# Patient Record
Sex: MALE | Race: WHITE | Employment: OTHER
[De-identification: names, ages, dates, MRNs, and addresses within clinical notes are randomized per-mention and may not be internally consistent; named-entity substitution may affect disease eponyms.]

---

## 2017-04-18 ENCOUNTER — RECORDS - HEALTHEAST (OUTPATIENT)
Dept: ADMINISTRATIVE | Facility: OTHER | Age: 77
End: 2017-04-18

## 2017-04-18 ENCOUNTER — ALLIED HEALTH/NURSE VISIT (OUTPATIENT)
Dept: FAMILY MEDICINE | Facility: CLINIC | Age: 77
End: 2017-04-18
Payer: COMMERCIAL

## 2017-04-18 VITALS — DIASTOLIC BLOOD PRESSURE: 81 MMHG | SYSTOLIC BLOOD PRESSURE: 120 MMHG

## 2017-04-18 DIAGNOSIS — Z01.30 BLOOD PRESSURE CHECK: Primary | ICD-10-CM

## 2017-04-18 PROCEDURE — 99207 ZZC NO CHARGE NURSE ONLY: CPT | Performed by: FAMILY MEDICINE

## 2017-04-18 NOTE — PROGRESS NOTES
rFoy Loredo is enrolled/participating in the retail pharmacy Blood Pressure Goals Achievement Program (BPGAP).  Froy Loredo was evaluated at [facility] on April 18, 2017 at which time his blood pressure was:    BP Readings from Last 3 Encounters:   04/18/17 120/81   05/17/16 128/78   03/16/16 142/88     Reviewed lifestyle modifications for blood pressure control and reduction: including making healthy food choices, managing weight, getting regular exercise, smoking cessation, reducing alcohol consumption, monitoring blood pressure regularly.     Froy Loredo is not experiencing symptoms.    Follow-Up: BP is at goal of < 140/90mmHg (patient 18+ years of age with or without diabetes).  Recommended follow-up at pharmacy in 6 months.     Completed by:   Laurie Nation, PharmD   [facility] pharmacist on behalf of [facility].

## 2017-04-18 NOTE — MR AVS SNAPSHOT
After Visit Summary   4/18/2017    Froy Loredo    MRN: [id number]           Patient Information     Date Of Birth          1940        Visit Information        Provider Department      4/18/2017 1:17 PM Bj Hull MD [facility]        Today's Diagnoses     Blood pressure check    -  1       Follow-ups after your visit        Who to contact     If you have questions or need follow up information about today's clinic visit or your schedule please contact [facility] directly at [phone].  Normal or non-critical lab and imaging results will be communicated to you by Securanthart, letter or phone within 4 business days after the clinic has received the results. If you do not hear from us within 7 days, please contact the clinic through Swallow Solution[employer] or phone. If you have a critical or abnormal lab result, we will notify you by phone as soon as possible.  Submit refill requests through Tab[employer] or call your pharmacy and they will forward the refill request to us. Please allow 3 business days for your refill to be completed.          Additional Information About Your Visit        MyChart Information     [employer] gives you secure access to your electronic health record. If you see a primary care provider, you can also send messages to your care team and make appointments. If you have questions, please call your primary care clinic.  If you do not have a primary care provider, please call [phone] and they will assist you.        Care EveryWhere ID     This is your Care EveryWhere ID. This could be used by other organizations to access your [facility] medical records  [id number]         Blood Pressure from Last 3 Encounters:   04/18/17 120/81   05/17/16 128/78   03/16/16 142/88    Weight from Last 3 Encounters:   01/26/16 155 lb (70.3 kg)   06/29/15 156 lb (70.8 kg)   04/01/15 154 lb (69.9 kg)              Today, you had the following     No orders found for display        Primary Care Provider Office Phone # Fax #    Bj Hull [phone]       [facility] [address] [facility] [facility] [address] [address]        Thank you!     Thank you for choosing [facility]  for your care. Our goal is always to provide you with excellent care. Hearing back from our patients is one way we can continue to improve our services. Please take a few minutes to complete the written survey that you may receive in the mail after your visit with us. Thank you!             Your Updated Medication List - Protect others around you: Learn how to safely use, store and throw away your medicines at www.disposemymeds.org.          This list is accurate as of: 4/18/17  1:19 PM.  Always use your most recent med list.                   Brand Name Dispense Instructions for use    finasteride 5 MG tablet    PROSCAR    90 tablet    Take 1 tablet (5 mg) by mouth daily       MULTI-VITAMIN PO      1 tablet DAILY       polyethylene glycol 0.4%- propylene glycol 0.3% 0.4-0.3 % Soln ophthalmic solution    SYSTANE ULTRA     Place 1 drop into both eyes 2 times daily       tadalafil 5 MG tablet    CIALIS    30 tablet    Take 1 tablet (5 mg) by mouth daily Never use with nitroglycerin, terazosin or doxazosin.       tamsulosin 0.4 MG capsule    FLOMAX    180 capsule    Take 2 capsules (0.8 mg) by mouth daily       VITAMIN C PO      2 TABLETS DAILY

## 2017-07-03 ENCOUNTER — RADIANT APPOINTMENT (OUTPATIENT)
Dept: GENERAL RADIOLOGY | Facility: CLINIC | Age: 77
End: 2017-07-03
Attending: ORTHOPAEDIC SURGERY
Payer: COMMERCIAL

## 2017-07-03 ENCOUNTER — OFFICE VISIT (OUTPATIENT)
Dept: ORTHOPEDICS | Facility: CLINIC | Age: 77
End: 2017-07-03
Payer: COMMERCIAL

## 2017-07-03 VITALS — BODY MASS INDEX: 24.17 KG/M2 | HEIGHT: 67 IN | RESPIRATION RATE: 18 BRPM | WEIGHT: 154 LBS | TEMPERATURE: 98.1 F

## 2017-07-03 DIAGNOSIS — M16.12 PRIMARY OSTEOARTHRITIS OF LEFT HIP: ICD-10-CM

## 2017-07-03 DIAGNOSIS — M25.552 HIP PAIN, LEFT: ICD-10-CM

## 2017-07-03 DIAGNOSIS — M25.552 HIP PAIN, LEFT: Primary | ICD-10-CM

## 2017-07-03 PROCEDURE — 73502 X-RAY EXAM HIP UNI 2-3 VIEWS: CPT

## 2017-07-03 PROCEDURE — 99214 OFFICE O/P EST MOD 30 MIN: CPT | Performed by: ORTHOPAEDIC SURGERY

## 2017-07-03 RX ORDER — SULINDAC 200 MG/1
200 TABLET ORAL 2 TIMES DAILY
Qty: 60 TABLET | Refills: 11 | Status: SHIPPED | OUTPATIENT
Start: 2017-07-03 | End: 2019-03-25

## 2017-07-03 NOTE — MR AVS SNAPSHOT
"              After Visit Summary   7/3/2017    Froy Loredo    MRN: [id number]           Patient Information     Date Of Birth          1940        Visit Information        Provider Department      7/3/2017 10:15 AM Carlos Drake MD [facility]        Today's Diagnoses     Hip pain, left    -  1    Primary osteoarthritis of left hip           Follow-ups after your visit        Who to contact     If you have questions or need follow up information about today's clinic visit or your schedule please contact [facility] directly at [phone].  Normal or non-critical lab and imaging results will be communicated to you by Tinfoil Securityhart, letter or phone within 4 business days after the clinic has received the results. If you do not hear from us within 7 days, please contact the clinic through NO[employer] or phone. If you have a critical or abnormal lab result, we will notify you by phone as soon as possible.  Submit refill requests through Keep [employer] or call your pharmacy and they will forward the refill request to us. Please allow 3 business days for your refill to be completed.          Additional Information About Your Visit        MyChart Information     [employer] gives you secure access to your electronic health record. If you see a primary care provider, you can also send messages to your care team and make appointments. If you have questions, please call your primary care clinic.  If you do not have a primary care provider, please call [phone] and they will assist you.        Care EveryWhere ID     This is your Care EveryWhere ID. This could be used by other organizations to access your [place] [facility] medical records  [id number]        Your Vitals Were     Temperature Respirations Height BMI (Body Mass Index)          98.1  F (36.7  C) 18 1.702 m (5' 7\") 24.12 kg/m2         Blood Pressure from Last 3 Encounters:   04/18/17 120/81   05/17/16 128/78   03/16/16 142/88    Weight from " Last 3 Encounters:   07/03/17 69.9 kg (154 lb)   01/26/16 70.3 kg (155 lb)   06/29/15 70.8 kg (156 lb)                 Today's Medication Changes          These changes are accurate as of: 7/3/17 11:59 PM.  If you have any questions, ask your nurse or doctor.               Start taking these medicines.        Dose/Directions    sulindac 200 MG tablet   Commonly known as:  CLINORIL   Used for:  Primary osteoarthritis of left hip   Started by:  Carlos Drake MD        Dose:  200 mg   Take 1 tablet (200 mg) by mouth 2 times daily   Quantity:  60 tablet   Refills:  11            Where to get your medicines      Some of these will need a paper prescription and others can be bought over the counter.  Ask your nurse if you have questions.     Bring a paper prescription for each of these medications     sulindac 200 MG tablet                Primary Care Provider Office Phone # Fax #    Bj Hull [phone]       [facility] [id number] [place] [address]        Equal Access to Services     CHITO PINZON : Hadii aad ku hadasho Soomaali, waaxda luqadaha, qaybta kaalmada adeegyada, waxay idiin hayvinhn sylvia khbettie wilson . So [facility] [phone].    ATENCIÓN: Si habla español, tiene a kolb disposición servicios gratuitos de asistencia lingüística. Llame al [phone].    We comply with applicable federal civil rights laws and [place] laws. We do not discriminate on the basis of race, color, national origin, age, disability sex, sexual orientation or gender identity.            Thank you!     Thank you for choosing [facility]  for your care. Our goal is always to provide you with excellent care. Hearing back from our patients is one way we can continue to improve our services. Please take a few minutes to complete the written survey that you may receive in the mail after your visit with us. Thank you!             Your Updated Medication List - Protect others  around you: Learn how to safely use, store and throw away your medicines at www.disposemymeds.org.          This list is accurate as of: 7/3/17 11:59 PM.  Always use your most recent med list.                   Brand Name Dispense Instructions for use Diagnosis    finasteride 5 MG tablet    PROSCAR    90 tablet    Take 1 tablet (5 mg) by mouth daily    BPH (benign prostatic hypertrophy) with urinary obstruction       MULTI-VITAMIN PO      1 tablet DAILY        polyethylene glycol 0.4%- propylene glycol 0.3% 0.4-0.3 % Soln ophthalmic solution    SYSTANE ULTRA     Place 1 drop into both eyes 2 times daily    Dry eyes, bilateral       sulindac 200 MG tablet    CLINORIL    60 tablet    Take 1 tablet (200 mg) by mouth 2 times daily    Primary osteoarthritis of left hip       tadalafil 5 MG tablet    CIALIS    30 tablet    Take 1 tablet (5 mg) by mouth daily Never use with nitroglycerin, terazosin or doxazosin.    ED (erectile dysfunction)       tamsulosin 0.4 MG capsule    FLOMAX    180 capsule    Take 2 capsules (0.8 mg) by mouth daily    BPH (benign prostatic hypertrophy) with urinary obstruction       VITAMIN C PO      2 TABLETS DAILY

## 2017-07-03 NOTE — LETTER
7/3/2017         RE: Froy Loredo  [address] [facility] [address]        Dear Colleague,    Thank you for referring your patient, Froy Loredo, to the [facility]. Please see a copy of my visit note below.    Froy Loredo is a 76 year old male who is seen as self referral for left thigh and calf pain.  Pain present for 4 months.  No history of injury.  Describes sharp and dull pain rated 5-8/10.  Worse with walking.  Tried ibuprofen, but stomach upset.      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 No family hx of      CANCER No family hx of      Hypertension No family hx of      CEREBROVASCULAR DISEASE No family hx of      Thyroid Disease No family hx of      Glaucoma No family hx of      Macular Degeneration No family hx of      Breast Cancer No family hx of      Prostate Cancer No family hx of      Depression No family hx of      Anxiety Disorder No family hx of      MENTAL ILLNESS No family hx of      Substance Abuse No family hx of      Anesthesia Reaction No family hx of      Asthma No family hx of      OSTEOPOROSIS No family hx of      Genetic Disorder No family hx of      Obesity No family hx of       Unknown/Adopted No family hx of        Social History     Social History     Marital status:      Spouse name: Radha     Number of children: 2     Years of education: N/A     Occupational History     Real estate appraisal Retired     [place] [employer]      Retired     Social History Main Topics     Smoking status: Never Smoker     Smokeless tobacco: Never Used      Comment: Lives in smoke free household     Alcohol use No     Drug use: No     Sexual activity: Yes     Partners: Female     Birth control/ protection: None     Other Topics Concern     Parent/Sibling W/ Cabg, Mi Or Angioplasty Before 65f 55m? No      Service Yes     Blood Transfusions No     Caffeine Concern No     Occupational Exposure No     Hobby Hazards No     Sleep Concern Yes     occazanaly     Stress Concern No     Weight Concern No     Special Diet No     Back Care No     Exercise Yes     Bike Helmet No     Seat Belt Yes     Self-Exams No     Social History Narrative       Current Outpatient Prescriptions   Medication Sig Dispense Refill     sulindac (CLINORIL) 200 MG tablet Take 1 tablet (200 mg) by mouth 2 times daily 60 tablet 11     finasteride (PROSCAR) 5 MG tablet Take 1 tablet (5 mg) by mouth daily 90 tablet 3     tamsulosin (FLOMAX) 0.4 MG 24 hr capsule Take 2 capsules (0.8 mg) by mouth daily 180 capsule 3     tadalafil (CIALIS) 5 MG tablet Take 1 tablet (5 mg) by mouth daily Never use with nitroglycerin, terazosin or doxazosin. 30 tablet 2     polyethylene glycol 0.4%- propylene glycol 0.3% (SYSTANE ULTRA) 0.4-0.3 % SOLN ophthalmic solution Place 1 drop into both eyes 2 times daily       MULTI-VITAMIN OR  1 tablet DAILY       VITAMIN C OR 2 TABLETS DAILY         Allergies   Allergen Reactions     Doxazosin      Lightheadedness 2013, resolved with switch to tamsulosin        REVIEW OF SYSTEMS:  CONSTITUTIONAL:  NEGATIVE for fever, chills, change in weight, not feeling tired  SKIN:  NEGATIVE for worrisome rashes, no skin lumps, no  "skin ulcers and no non-healing wounds  EYES:  NEGATIVE for vision changes or irritation.  ENT/MOUTH:  NEGATIVE.  No hearing loss, no hoarseness, no difficulty swallowing.  RESP:  NEGATIVE. No cough or shortness of breath.  CV:  NEGATIVE for chest pain, palpitations or peripheral edema  GI:  NEGATIVE for nausea, abdominal pain, heartburn, or change in bowel habits  :  Negative. No dysuria, no hematuria  MUSCULOSKELETAL:  See HPI above  NEURO:  NEGATIVE . No headaches, no dizziness,  no numbness  ENDOCRINE:  NEGATIVE for temperature intolerance, skin/hair changes  HEME/ALLERGY/IMMUNE:  NEGATIVE for bleeding problems  PSYCHIATRIC:  NEGATIVE. no anxiety, no depression.    Xray images were independently visualized with the patient. Pelvis and lateral left hip show mild bilateral hip osteoarthritis.  Unchanged since 2012.    Exam:  Vitals: Temp 98.1  F (36.7  C)  Resp 18  Ht 1.702 m (5' 7\")  Wt 69.9 kg (154 lb)  BMI 24.12 kg/m2  BMI= Body mass index is 24.12 kg/(m^2).  Constitutional:  healthy, alert and no distress  Neuro: Alert and Oriented x 3, Gait normal. Sensation grossly WNL.  HEENT:  Atraumatic, EOMI  Neck:  Neck supple with no tenderness.  Psych: Affect normal   Respiratory: Breathing not labored.  Cardiovascular: normal peripheral pulses  Lymph: no adenopathy  Skin: No rashes,worrisome lesions or skin problems  Spine: straight, no straight leg raising pain.  There is no tenderness over the sacro-iliac joints, sciatic notch, or greater trochanters.   Hips have decreased range of motion with right external rotation 50 degrees, internal rotation 20 degrees without pain.  Left has external rotation 50 degrees, internal rotation 15 degrees with moderate pain.  Knees have full range of motion without pain.  No ligament laxity.  Negative Maximilian.  No effusion.  No warmth or erythema.  No tenderness along iliotibial band.  No calf tenderness.  Sensation, motor and circulation are intact.    Assessment:  Left leg " pain with limited range of motion of hips and mild osteoarthritis of hips by x-ray.  Certainly not severe enough for surgery.  Plan:  Sulindac 200 mg twice daily.  Resume activity as tolerated.    Again, thank you for allowing me to participate in the care of your patient.        Sincerely,        Carlos Drake MD

## 2017-07-03 NOTE — NURSING NOTE
"Chief Complaint   Patient presents with     RECHECK     New issue. Left thigh and calf pain for the last 4 months, no injury. Pain level 5-8/10 sharp, dull and episodic. Walking makes the pain worse and nothing helps the pain.       Initial Temp 98.1  F (36.7  C)  Resp 18  Ht 1.702 m (5' 7\")  Wt 69.9 kg (154 lb)  BMI 24.12 kg/m2 Estimated body mass index is 24.12 kg/(m^2) as calculated from the following:    Height as of this encounter: 1.702 m (5' 7\").    Weight as of this encounter: 69.9 kg (154 lb).  Medication Reconciliation: complete   Mary Kay Bautista MA      "

## 2017-07-06 NOTE — TELEPHONE ENCOUNTER
Patient overdue for physical and labs.   Per PCP, patient was due in Jan 2017.    No appointment pending at this time.  Routing to provider to advise.    Mansi Montano RN

## 2017-07-06 NOTE — PROGRESS NOTES
Froy Loredo is a 76 year old male who is seen as self referral for left thigh and calf pain.  Pain present for 4 months.  No history of injury.  Describes sharp and dull pain rated 5-8/10.  Worse with walking.  Tried ibuprofen, but stomach upset.      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 No family hx of      CANCER No family hx of      Hypertension No family hx of      CEREBROVASCULAR DISEASE No family hx of      Thyroid Disease No family hx of      Glaucoma No family hx of      Macular Degeneration No family hx of      Breast Cancer No family hx of      Prostate Cancer No family hx of      Depression No family hx of      Anxiety Disorder No family hx of      MENTAL ILLNESS No family hx of      Substance Abuse No family hx of      Anesthesia Reaction No family hx of      Asthma No family hx of      OSTEOPOROSIS No family hx of      Genetic Disorder No family hx of      Obesity No family hx of      Unknown/Adopted No family hx of        Social History     Social History     Marital status:      Spouse name: Radha     Number of children: 2     Years of education: N/A     Occupational History     Real estate appraisal Retired     [place]  [employer]      Retired     Social History Main Topics     Smoking status: Never Smoker     Smokeless tobacco: Never Used      Comment: Lives in smoke free household     Alcohol use No     Drug use: No     Sexual activity: Yes     Partners: Female     Birth control/ protection: None     Other Topics Concern     Parent/Sibling W/ Cabg, Mi Or Angioplasty Before 65f 55m? No      Service Yes     Blood Transfusions No     Caffeine Concern No     Occupational Exposure No     Hobby Hazards No     Sleep Concern Yes     occazanaly     Stress Concern No     Weight Concern No     Special Diet No     Back Care No     Exercise Yes     Bike Helmet No     Seat Belt Yes     Self-Exams No     Social History Narrative       Current Outpatient Prescriptions   Medication Sig Dispense Refill     sulindac (CLINORIL) 200 MG tablet Take 1 tablet (200 mg) by mouth 2 times daily 60 tablet 11     finasteride (PROSCAR) 5 MG tablet Take 1 tablet (5 mg) by mouth daily 90 tablet 3     tamsulosin (FLOMAX) 0.4 MG 24 hr capsule Take 2 capsules (0.8 mg) by mouth daily 180 capsule 3     tadalafil (CIALIS) 5 MG tablet Take 1 tablet (5 mg) by mouth daily Never use with nitroglycerin, terazosin or doxazosin. 30 tablet 2     polyethylene glycol 0.4%- propylene glycol 0.3% (SYSTANE ULTRA) 0.4-0.3 % SOLN ophthalmic solution Place 1 drop into both eyes 2 times daily       MULTI-VITAMIN OR  1 tablet DAILY       VITAMIN C OR 2 TABLETS DAILY         Allergies   Allergen Reactions     Doxazosin      Lightheadedness 2013, resolved with switch to tamsulosin        REVIEW OF SYSTEMS:  CONSTITUTIONAL:  NEGATIVE for fever, chills, change in weight, not feeling tired  SKIN:  NEGATIVE for worrisome rashes, no skin lumps, no skin ulcers and no non-healing wounds  EYES:  NEGATIVE for vision changes or irritation.  ENT/MOUTH:  NEGATIVE.  No hearing loss, no hoarseness, no difficulty swallowing.  RESP:  NEGATIVE. No cough or shortness of breath.  CV:  NEGATIVE for chest  "pain, palpitations or peripheral edema  GI:  NEGATIVE for nausea, abdominal pain, heartburn, or change in bowel habits  :  Negative. No dysuria, no hematuria  MUSCULOSKELETAL:  See HPI above  NEURO:  NEGATIVE . No headaches, no dizziness,  no numbness  ENDOCRINE:  NEGATIVE for temperature intolerance, skin/hair changes  HEME/ALLERGY/IMMUNE:  NEGATIVE for bleeding problems  PSYCHIATRIC:  NEGATIVE. no anxiety, no depression.    Xray images were independently visualized with the patient. Pelvis and lateral left hip show mild bilateral hip osteoarthritis.  Unchanged since 2012.    Exam:  Vitals: Temp 98.1  F (36.7  C)  Resp 18  Ht 1.702 m (5' 7\")  Wt 69.9 kg (154 lb)  BMI 24.12 kg/m2  BMI= Body mass index is 24.12 kg/(m^2).  Constitutional:  healthy, alert and no distress  Neuro: Alert and Oriented x 3, Gait normal. Sensation grossly WNL.  HEENT:  Atraumatic, EOMI  Neck:  Neck supple with no tenderness.  Psych: Affect normal   Respiratory: Breathing not labored.  Cardiovascular: normal peripheral pulses  Lymph: no adenopathy  Skin: No rashes,worrisome lesions or skin problems  Spine: straight, no straight leg raising pain.  There is no tenderness over the sacro-iliac joints, sciatic notch, or greater trochanters.   Hips have decreased range of motion with right external rotation 50 degrees, internal rotation 20 degrees without pain.  Left has external rotation 50 degrees, internal rotation 15 degrees with moderate pain.  Knees have full range of motion without pain.  No ligament laxity.  Negative Maximilian.  No effusion.  No warmth or erythema.  No tenderness along iliotibial band.  No calf tenderness.  Sensation, motor and circulation are intact.    Assessment:  Left leg pain with limited range of motion of hips and mild osteoarthritis of hips by x-ray.  Certainly not severe enough for surgery.  Plan:  Sulindac 200 mg twice daily.  Resume activity as tolerated.  "

## 2017-07-11 RX ORDER — FINASTERIDE 5 MG/1
5 TABLET, FILM COATED ORAL DAILY
Qty: 90 TABLET | Refills: 3 | Status: CANCELLED | OUTPATIENT
Start: 2017-07-11

## 2017-07-11 RX ORDER — TAMSULOSIN HYDROCHLORIDE 0.4 MG/1
0.8 CAPSULE ORAL DAILY
Qty: 180 CAPSULE | Refills: 3 | Status: CANCELLED | OUTPATIENT
Start: 2017-07-11

## 2017-07-12 RX ORDER — FINASTERIDE 5 MG/1
5 TABLET, FILM COATED ORAL DAILY
Qty: 90 TABLET | Refills: 3 | OUTPATIENT
Start: 2017-07-12

## 2017-07-12 RX ORDER — TAMSULOSIN HYDROCHLORIDE 0.4 MG/1
0.8 CAPSULE ORAL DAILY
Qty: 180 CAPSULE | Refills: 3 | OUTPATIENT
Start: 2017-07-12

## 2017-07-21 RX ORDER — FINASTERIDE 5 MG/1
5 TABLET, FILM COATED ORAL DAILY
Qty: 90 TABLET | Refills: 3 | OUTPATIENT
Start: 2017-07-21

## 2017-07-24 DIAGNOSIS — N40.1 BENIGN PROSTATIC HYPERPLASIA WITH URINARY OBSTRUCTION: Primary | ICD-10-CM

## 2017-07-24 DIAGNOSIS — N13.8 BENIGN PROSTATIC HYPERPLASIA WITH URINARY OBSTRUCTION: Primary | ICD-10-CM

## 2017-07-25 ENCOUNTER — DOCUMENTATION ONLY (OUTPATIENT)
Dept: LAB | Facility: CLINIC | Age: 77
End: 2017-07-25

## 2017-07-25 DIAGNOSIS — Z13.6 CARDIOVASCULAR SCREENING; LDL GOAL LESS THAN 160: Primary | ICD-10-CM

## 2017-07-25 DIAGNOSIS — Z00.00 ROUTINE GENERAL MEDICAL EXAMINATION AT A HEALTH CARE FACILITY: ICD-10-CM

## 2017-07-25 NOTE — PROGRESS NOTES
PLEASE REVIEW, PLACE FUTURE ORDERS OR SIGN PENDED ORDERS FOR PATIENT'S UPCOMING LAB ONLY APPOINTMENT ON 07.28.2017.    THANK YOU.   JEREMIAH STODDARD

## 2017-07-26 RX ORDER — TAMSULOSIN HYDROCHLORIDE 0.4 MG/1
0.8 CAPSULE ORAL DAILY
Qty: 180 CAPSULE | Refills: 0 | Status: SHIPPED | OUTPATIENT
Start: 2017-07-26 | End: 2017-08-02

## 2017-07-26 NOTE — PROGRESS NOTES
Please review lab orders sign and close encounter. Anastasia Flower MA/KATARZYNA    Physical 8/2/17

## 2017-07-26 NOTE — TELEPHONE ENCOUNTER
Medication is being filled for 1 time refill only due to:  Patient needs to be seen because it has been more than one year since last visit.   Joan Barnes RN    Pt has upcoming annual exam scheduled.

## 2017-07-28 DIAGNOSIS — Z00.00 ROUTINE GENERAL MEDICAL EXAMINATION AT A HEALTH CARE FACILITY: ICD-10-CM

## 2017-07-28 DIAGNOSIS — Z13.6 CARDIOVASCULAR SCREENING; LDL GOAL LESS THAN 160: ICD-10-CM

## 2017-07-28 LAB
ANION GAP SERPL CALCULATED.3IONS-SCNC: 11 MMOL/L (ref 3–14)
BUN SERPL-MCNC: 28 MG/DL (ref 7–30)
CALCIUM SERPL-MCNC: 8.9 MG/DL (ref 8.5–10.1)
CHLORIDE SERPL-SCNC: 107 MMOL/L (ref 94–109)
CHOLEST SERPL-MCNC: 166 MG/DL
CO2 SERPL-SCNC: 23 MMOL/L (ref 20–32)
CREAT SERPL-MCNC: 1.04 MG/DL (ref 0.66–1.25)
GFR SERPL CREATININE-BSD FRML MDRD: 69 ML/MIN/1.7M2
GLUCOSE SERPL-MCNC: 93 MG/DL (ref 70–99)
HDLC SERPL-MCNC: 65 MG/DL
LDLC SERPL CALC-MCNC: 89 MG/DL
NONHDLC SERPL-MCNC: 101 MG/DL
POTASSIUM SERPL-SCNC: 3.8 MMOL/L (ref 3.4–5.3)
SODIUM SERPL-SCNC: 141 MMOL/L (ref 133–144)
TRIGL SERPL-MCNC: 62 MG/DL

## 2017-07-28 PROCEDURE — 80048 BASIC METABOLIC PNL TOTAL CA: CPT | Performed by: FAMILY MEDICINE

## 2017-07-28 PROCEDURE — 80061 LIPID PANEL: CPT | Performed by: FAMILY MEDICINE

## 2017-07-28 PROCEDURE — 36415 COLL VENOUS BLD VENIPUNCTURE: CPT | Performed by: FAMILY MEDICINE

## 2017-07-28 NOTE — PROGRESS NOTES
"  SUBJECTIVE:   Froy Loredo is a 76 year old male who presents for Preventive Visit.    BPH/adrenal tumor - symptoms include polyuria and nocturia - previously has to get up at night every 2-3 hours to urinate (now down to 1-2 per night). There is some discomfort with urination. There is hesitation, difficult to start. Has to do \"go back\" sometimes. No gross hematuria but has had microscopic hematuria. Exam 9/18/14 showed a very large prostate.   Evaluation and treatment:    He has seen urology. U/S showed hydronephrosis but CT did not. 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    Surgical intervention for the adrenal tumor and BPH were discussed but with urology but decided against it.    Flomax 0.8 mg - has been helpful without side effects.   Finasteride 5 mg qd - has been helpful without side effects.     PSA previously borderline but has come down since.      PSA   Date Value Ref Range Status   01/26/2016 1.57 0 - 4 ug/L Final         PSA    Date  Value  Ref Range  Status    04/01/2015  1.82  0 - 4 ug/L  Final      Sinus tachycardia - asymptomatic. Review of his records showed high pulse rate off and on. Stress echo 7/5/13 normal.   continue to monitor.     TSH    Date  Value  Ref Range  Status    04/01/2015  1.24  0.40 - 4.00 mU/L  Final      Comment:      Effective 7/30/2014, the reference range for this assay has changed to reflect  new instrumentation/methodology.       Esophageal ring - EGD 7/18/11 showed schatzki ring with negative h pylori. He is asymptomatic at this time.    Insomnia - it takes a long time to fall asleep. Trazodone caused drowsiness in AM. He is ok now.    Fatigue - feels tired. He exercises. He has to rest after doing some yard work. Denies chest pain or shortness of breath. Denies depression, sleep apnea.   We will check some labs   He should report any chest pain or shortness of breath.    ED - Erection are poor. He has tried Levitra in the past which caused dizziness. I " don't know if he tried Cialis.     Ene    Pectus excavatum - asymptomatic.    Precancerous lesion on chest - per patient. Scar is noted.    Previous surgeries - right knee surgery.     Preventive:    Prevnar: advised to get at our pharmacy and he did.    Immunization History   Administered Date(s) Administered     Influenza (High Dose) 3 valent vaccine 09/18/2014     Pneumococcal (PCV 13) 08/02/2017     Pneumococcal 23 valent 06/23/2011     TD (ADULT, 7+) 05/11/2006     TDAP Vaccine (Boostrix) 10/25/2013     Zoster vaccine, live 04/22/2016       Lipids:     Recent Labs   Lab Test  07/28/17   1015  01/26/16   1243  09/11/14   0848  06/24/13   0947   CHOL  166   --   198  206*   HDL  65   --   61  49   LDL  89  117*  119  130*   TRIG  62   --   88  137   CHOLHDLRATIO   --    --   3.2  4.2       Diabetes screen:     Last Basic Metabolic Panel:  Lab Results   Component Value Date     07/28/2017      Lab Results   Component Value Date    POTASSIUM 3.8 07/28/2017     Lab Results   Component Value Date    CHLORIDE 107 07/28/2017     Lab Results   Component Value Date    KAMALJIT 8.9 07/28/2017     Lab Results   Component Value Date    CO2 23 07/28/2017     Lab Results   Component Value Date    BUN 28 07/28/2017     Lab Results   Component Value Date    CR 1.04 07/28/2017     Lab Results   Component Value Date    GLC 93 07/28/2017         Colonoscopy: 3/29/12 - repeat in 10 years    Advanced Directive: on file from 2011    Vit D Geriatrics Society Guidelines: Older adults should consume 4000 IU of Vit D daily from all sources. This will achieve serum level of 30. No need to test unless obese, malabsorption etc. You get only 100 units per glass of milk. Advised to take 2000 units daily.      SH:    Marital status:   Kids: 2  Employment: retired   Exercise: walking daily, bike sometimes  Tobacco: denies  Etoh: denies  Recreational drugs: denies  Caffeine: 1 pop per week              Social  History   Substance Use Topics     Smoking status: Never Smoker     Smokeless tobacco: Never Used      Comment: Lives in smoke free household     Alcohol use No       The patient does not drink >3 drinks per day nor >7 drinks per week.    Today's PHQ-2 Score:   PHQ-2 ( 1999 [employer]) 8/2/2017 1/26/2016   Q1: Little interest or pleasure in doing things 0 0   Q2: Feeling down, depressed or hopeless 0 0   PHQ-2 Score 0 0   Q1: Little interest or pleasure in doing things - -   Q2: Feeling down, depressed or hopeless - -   PHQ-2 Score - -         Do you feel safe in your environment - Yes    Do you have a Health Care Directive?: Yes: Advance Directive has been received and scanned.    Current providers sharing in care for this patient include: Patient Care Team:  Bj Hull MD as PCP - General (Family Practice)  Lise Sahni RN as       Hearing impairment: No    Ability to successfully perform activities of daily living: Yes, no assistance needed     Fall risk:  Fallen 2 or more times in the past year?: No  Any fall with injury in the past year?: No      Home safety:  none identified      The following health maintenance items are reviewed in Epic and correct as of today:Health Maintenance   Topic Date Due     PNEUMOCOCCAL (2 of 2 - PCV13) 06/23/2012     FALL RISK ASSESSMENT  01/26/2017     EYE EXAM Q1 YEAR  05/10/2017     INFLUENZA VACCINE (SYSTEM ASSIGNED)  09/01/2017     PSA Q3 YR  01/26/2019     ADVANCE DIRECTIVE PLANNING Q5 YRS  09/18/2019     LIPID SCREEN Q5 YR MALE (SYSTEM ASSIGNED)  01/26/2021     TETANUS IMMUNIZATION (SYSTEM ASSIGNED)  10/25/2023             ROS:  C: NEGATIVE for fever, chills, change in weight  I: NEGATIVE for worrisome rashes, moles or lesions  E: NEGATIVE for vision changes or irritation  E/M: NEGATIVE for ear, mouth and throat problems  R: NEGATIVE for significant cough or SOB  B: NEGATIVE for masses, tenderness or discharge  CV: NEGATIVE for chest pain,  "palpitations or peripheral edema  GI: NEGATIVE for nausea, abdominal pain, heartburn, or change in bowel habits  : NEGATIVE for frequency, dysuria, or hematuria  M: NEGATIVE for significant arthralgias or myalgia  N: NEGATIVE for weakness, dizziness or paresthesias  E: NEGATIVE for temperature intolerance, skin/hair changes  H: NEGATIVE for bleeding problems  P: NEGATIVE for changes in mood or affect    OBJECTIVE:   /70  Pulse 101  Temp 98.2  F (36.8  C) (Oral)  Ht 5' 6.25\" (1.683 m)  Wt 150 lb (68 kg)  SpO2 95%  BMI 24.03 kg/m2 Estimated body mass index is 24.12 kg/(m^2) as calculated from the following:    Height as of 7/3/17: 5' 7\" (1.702 m).    Weight as of 7/3/17: 154 lb (69.9 kg).  EXAM:     Male: GENERAL APPEARANCE: healthy, alert and no distress  EYES: Eyes grossly normal to inspection, PERRL and conjunctivae and sclerae normal  HENT: ear canals and TM's normal, nose and mouth without ulcers or lesions, oropharynx clear and oral mucous membranes moist  NECK: no adenopathy, no asymmetry, masses, or scars and thyroid normal to palpation  RESP: lungs clear to auscultation - no rales, rhonchi or wheezes  CV: regular rates and rhythm, normal S1 S2, no S3 or S4, no murmur, click or rub, no peripheral edema and peripheral pulses strong  ABDOMEN: soft, nontender, no hepatosplenomegaly, no masses and bowel sounds normal   (male): normal male genitalia without lesions or urethral discharge, no hernia  RECTAL: exam not done today but previously \"prostate very large\"   MS: Pectus excavatum.  SKIN: no suspicious lesions or rashes. Upper chest scar.  NEURO: Normal strength and tone, sensory exam grossly normal, mentation intact and speech normal  PSYCH: mentation appears normal and affect normal/bright    ASSESSMENT / PLAN:       End of Life Planning:  Patient currently has an advanced directive: Yes.  Practitioner is supportive of decision.    COUNSELING:  Reviewed preventive health counseling, as " "reflected in patient instructions       Regular exercise       Healthy diet/nutrition        Estimated body mass index is 24.12 kg/(m^2) as calculated from the following:    Height as of 7/3/17: 5' 7\" (1.702 m).    Weight as of 7/3/17: 154 lb (69.9 kg).     reports that he has never smoked. He has never used smokeless tobacco.        Appropriate preventive services were discussed with this patient, including applicable screening as appropriate for cardiovascular disease, diabetes, osteopenia/osteoporosis, and glaucoma.  As appropriate for age/gender, discussed screening for colorectal cancer, prostate cancer, breast cancer, and cervical cancer. Checklist reviewing preventive services available has been given to the patient.    Reviewed patients plan of care and provided an AVS. The Basic Care Plan (routine screening as documented in Health Maintenance) for Froy meets the Care Plan requirement. This Care Plan has been established and reviewed with the Patient.  Assessment and Plan - Decision Making    1. Routine general medical examination at a health care facility    Results of today's exam given to patient verbally along with age appropriate preventive measures. Written instructions reviewed and handed to the patient.      2. Benign prostatic hyperplasia with urinary obstruction  Per HPI  - tamsulosin (FLOMAX) 0.4 MG capsule; Take 2 capsules (0.8 mg) by mouth daily Please send in 3 months as he just got one already.  Dispense: 180 capsule; Refill: 2  - finasteride (PROSCAR) 5 MG tablet; Take 1 tablet (5 mg) by mouth daily  Dispense: 90 tablet; Refill: 3  - Prostate spec antigen screen    3. Other fatigue  Per HPI  - TSH with free T4 reflex  - Vitamin D Deficiency  - CBC with platelets differential      Written instructions given as follows:    Patient Instructions     1. I recommend including veggies, fresh fruits (3 to 5 servings), nuts (unsalted) in your daily diet. Cut down on carbs like bread, pasta, rice, " potatoes. Cut down on red meats like burgers etc. Increase healthy proteins like beans, tofu, tuna, chicken and turkey.    2. Get regular exercise like jogging, biking, swimming at least 3 times per week.      3. Avoid the sun or otherwise use sun screen - please look at the pamphlet I gave you about melanoma.    4. See the dentist and eye regularly.    5. Please stop by our pharmacy and get a pneumonia shot.    6. If all is well, let us meet again in one year.

## 2017-07-28 NOTE — PATIENT INSTRUCTIONS
1. I recommend including veggies, fresh fruits (3 to 5 servings), nuts (unsalted) in your daily diet. Cut down on carbs like bread, pasta, rice, potatoes. Cut down on red meats like burgers etc. Increase healthy proteins like beans, tofu, tuna, chicken and turkey.    2. Get regular exercise like jogging, biking, swimming at least 3 times per week.      3. Avoid the sun or otherwise use sun screen - please look at the pamphlet I gave you about melanoma.    4. See the dentist and eye regularly.    5. Please stop by our pharmacy and get a pneumonia shot.    6. If all is well, let us meet again in one year.

## 2017-08-02 ENCOUNTER — OFFICE VISIT (OUTPATIENT)
Dept: FAMILY MEDICINE | Facility: CLINIC | Age: 77
End: 2017-08-02
Payer: COMMERCIAL

## 2017-08-02 VITALS
HEIGHT: 66 IN | DIASTOLIC BLOOD PRESSURE: 70 MMHG | BODY MASS INDEX: 24.11 KG/M2 | HEART RATE: 101 BPM | TEMPERATURE: 98.2 F | SYSTOLIC BLOOD PRESSURE: 120 MMHG | OXYGEN SATURATION: 95 % | WEIGHT: 150 LBS

## 2017-08-02 DIAGNOSIS — N40.1 BENIGN PROSTATIC HYPERPLASIA WITH URINARY OBSTRUCTION: ICD-10-CM

## 2017-08-02 DIAGNOSIS — R53.83 OTHER FATIGUE: ICD-10-CM

## 2017-08-02 DIAGNOSIS — Z00.00 ROUTINE GENERAL MEDICAL EXAMINATION AT A HEALTH CARE FACILITY: Primary | ICD-10-CM

## 2017-08-02 DIAGNOSIS — N13.8 BENIGN PROSTATIC HYPERPLASIA WITH URINARY OBSTRUCTION: ICD-10-CM

## 2017-08-02 LAB
BASOPHILS # BLD AUTO: 0.1 10E9/L (ref 0–0.2)
BASOPHILS NFR BLD AUTO: 0.7 %
DIFFERENTIAL METHOD BLD: ABNORMAL
EOSINOPHIL # BLD AUTO: 0.1 10E9/L (ref 0–0.7)
EOSINOPHIL NFR BLD AUTO: 1.6 %
ERYTHROCYTE [DISTWIDTH] IN BLOOD BY AUTOMATED COUNT: 13.5 % (ref 10–15)
HCT VFR BLD AUTO: 50.2 % (ref 40–53)
HGB BLD-MCNC: 16.7 G/DL (ref 13.3–17.7)
LYMPHOCYTES # BLD AUTO: 0.4 10E9/L (ref 0.8–5.3)
LYMPHOCYTES NFR BLD AUTO: 5.9 %
MCH RBC QN AUTO: 31.5 PG (ref 26.5–33)
MCHC RBC AUTO-ENTMCNC: 33.3 G/DL (ref 31.5–36.5)
MCV RBC AUTO: 95 FL (ref 78–100)
MONOCYTES # BLD AUTO: 0.4 10E9/L (ref 0–1.3)
MONOCYTES NFR BLD AUTO: 6.3 %
NEUTROPHILS # BLD AUTO: 6 10E9/L (ref 1.6–8.3)
NEUTROPHILS NFR BLD AUTO: 85.5 %
PLATELET # BLD AUTO: 169 10E9/L (ref 150–450)
PSA SERPL-ACNC: 1.6 UG/L (ref 0–4)
RBC # BLD AUTO: 5.31 10E12/L (ref 4.4–5.9)
TSH SERPL DL<=0.005 MIU/L-ACNC: 1.14 MU/L (ref 0.4–4)
WBC # BLD AUTO: 7 10E9/L (ref 4–11)

## 2017-08-02 PROCEDURE — 82306 VITAMIN D 25 HYDROXY: CPT | Performed by: FAMILY MEDICINE

## 2017-08-02 PROCEDURE — 84443 ASSAY THYROID STIM HORMONE: CPT | Performed by: FAMILY MEDICINE

## 2017-08-02 PROCEDURE — 85025 COMPLETE CBC W/AUTO DIFF WBC: CPT | Performed by: FAMILY MEDICINE

## 2017-08-02 PROCEDURE — G0439 PPPS, SUBSEQ VISIT: HCPCS | Performed by: FAMILY MEDICINE

## 2017-08-02 PROCEDURE — 36415 COLL VENOUS BLD VENIPUNCTURE: CPT | Performed by: FAMILY MEDICINE

## 2017-08-02 PROCEDURE — G0103 PSA SCREENING: HCPCS | Performed by: FAMILY MEDICINE

## 2017-08-02 RX ORDER — TAMSULOSIN HYDROCHLORIDE 0.4 MG/1
0.8 CAPSULE ORAL DAILY
Qty: 180 CAPSULE | Refills: 2 | Status: SHIPPED | OUTPATIENT
Start: 2017-08-02 | End: 2017-10-17

## 2017-08-02 RX ORDER — FINASTERIDE 5 MG/1
5 TABLET, FILM COATED ORAL DAILY
Qty: 90 TABLET | Refills: 3 | Status: SHIPPED | OUTPATIENT
Start: 2017-08-02 | End: 2018-10-05

## 2017-08-02 NOTE — NURSING NOTE
"Chief Complaint   Patient presents with     Physical       Initial /83 (Cuff Size: Adult Regular)  Pulse 101  Temp 98.2  F (36.8  C) (Oral)  Ht 5' 6.25\" (1.683 m)  Wt 150 lb (68 kg)  SpO2 95%  BMI 24.03 kg/m2 Estimated body mass index is 24.03 kg/(m^2) as calculated from the following:    Height as of this encounter: 5' 6.25\" (1.683 m).    Weight as of this encounter: 150 lb (68 kg).  Medication Reconciliation: complete    Abbey Argueta LPN    "

## 2017-08-02 NOTE — MR AVS SNAPSHOT
After Visit Summary   8/2/2017    Froy Loredo    MRN: [id number]           Patient Information     Date Of Birth          1940        Visit Information        Provider Department      8/2/2017 10:50 AM Bj Hull MD [facility]        Today's Diagnoses     Routine general medical examination at a health care facility    -  1    Benign prostatic hyperplasia with urinary obstruction        Other fatigue          Care Instructions      1. I recommend including veggies, fresh fruits (3 to 5 servings), nuts (unsalted) in your daily diet. Cut down on carbs like bread, pasta, rice, potatoes. Cut down on red meats like burgers etc. Increase healthy proteins like beans, tofu, tuna, chicken and turkey.    2. Get regular exercise like jogging, biking, swimming at least 3 times per week.      3. Avoid the sun or otherwise use sun screen - please look at the pamphlet I gave you about melanoma.    4. See the dentist and eye regularly.    5. Please stop by our pharmacy and get a pneumonia shot.    6. If all is well, let us meet again in one year.          Follow-ups after your visit        Who to contact     If you have questions or need follow up information about today's clinic visit or your schedule please contact [facility] directly at [phone].  Normal or non-critical lab and imaging results will be communicated to you by MyChart, letter or phone within 4 business days after the clinic has received the results. If you do not hear from us within 7 days, please contact the clinic through [employer] or phone. If you have a critical or abnormal lab result, we will notify you by phone as soon as possible.  Submit refill requests through [employer] or call your pharmacy and they will forward the refill request to us. Please allow 3 business days for your refill to be completed.          Additional Information About Your Visit        [employer] Information     [employer] gives you  "secure access to your electronic health record. If you see a primary care provider, you can also send messages to your care team and make appointments. If you have questions, please call your primary care clinic.  If you do not have a primary care provider, please call [phone] and they will assist you.        Care EveryWhere ID     This is your Care EveryWhere ID. This could be used by other organizations to access your [facility] medical records  [id number]        Your Vitals Were     Pulse Temperature Height Pulse Oximetry BMI (Body Mass Index)       101 98.2  F (36.8  C) (Oral) 5' 6.25\" (1.683 m) 95% 24.03 kg/m2        Blood Pressure from Last 3 Encounters:   08/02/17 120/70   04/18/17 120/81   05/17/16 128/78    Weight from Last 3 Encounters:   08/02/17 150 lb (68 kg)   07/03/17 154 lb (69.9 kg)   01/26/16 155 lb (70.3 kg)              We Performed the Following     CBC with platelets differential     Prostate spec antigen screen     TSH with free T4 reflex     Vitamin D Deficiency          Today's Medication Changes          These changes are accurate as of: 8/2/17 11:20 AM.  If you have any questions, ask your nurse or doctor.               These medicines have changed or have updated prescriptions.        Dose/Directions    tamsulosin 0.4 MG capsule   Commonly known as:  FLOMAX   This may have changed:  additional instructions   Used for:  Benign prostatic hyperplasia with urinary obstruction   Changed by:  Bj Hull MD        Dose:  0.8 mg   Take 2 capsules (0.8 mg) by mouth daily Please send in 3 months as he just got one already.   Quantity:  180 capsule   Refills:  2            Where to get your medicines      These medications were sent to [facility], [address] [address]     Phone:  [phone]     finasteride 5 MG tablet    tamsulosin 0.4 MG capsule                Primary Care Provider Office Phone # Fax #    " Bj Hull [phone]       [facility] [address] [facility] [address]        Equal Access to Services     ZACH PINZON : Hadii aad ku hadиванshirlene Isabelleelaine, waregisda luqadaha, qaybta kaalmada ademax, emmanuel clairein hayaadaron arroyoflaca anton steve kim. So [facility] [phone].    ATENCIÓN: Si habla español, tiene a kolb disposición servicios gratuitos de asistencia lingüística. Llame al [phone].    We comply with applicable federal civil rights laws and [place] laws. We do not discriminate on the basis of race, color, national origin, age, disability sex, sexual orientation or gender identity.            Thank you!     Thank you for choosing [facility]  for your care. Our goal is always to provide you with excellent care. Hearing back from our patients is one way we can continue to improve our services. Please take a few minutes to complete the written survey that you may receive in the mail after your visit with us. Thank you!             Your Updated Medication List - Protect others around you: Learn how to safely use, store and throw away your medicines at www.disposemymeds.org.          This list is accurate as of: 8/2/17 11:20 AM.  Always use your most recent med list.                   Brand Name Dispense Instructions for use Diagnosis    finasteride 5 MG tablet    PROSCAR    90 tablet    Take 1 tablet (5 mg) by mouth daily    Benign prostatic hyperplasia with urinary obstruction       MULTI-VITAMIN PO      1 tablet DAILY        polyethylene glycol 0.4%- propylene glycol 0.3% 0.4-0.3 % Soln ophthalmic solution    SYSTANE ULTRA     Place 1 drop into both eyes 2 times daily    Dry eyes, bilateral       sulindac 200 MG tablet    CLINORIL    60 tablet    Take 1 tablet (200 mg) by mouth 2 times daily    Primary osteoarthritis of left hip       tadalafil 5 MG tablet    CIALIS    30 tablet    Take 1 tablet (5 mg) by mouth daily Never use with nitroglycerin, terazosin or  doxazosin.    ED (erectile dysfunction)       tamsulosin 0.4 MG capsule    FLOMAX    180 capsule    Take 2 capsules (0.8 mg) by mouth daily Please send in 3 months as he just got one already.    Benign prostatic hyperplasia with urinary obstruction       VITAMIN C PO      2 TABLETS DAILY

## 2017-08-03 LAB — DEPRECATED CALCIDIOL+CALCIFEROL SERPL-MC: 30 UG/L (ref 20–75)

## 2017-10-17 DIAGNOSIS — N40.1 BENIGN PROSTATIC HYPERPLASIA WITH URINARY OBSTRUCTION: ICD-10-CM

## 2017-10-17 DIAGNOSIS — N13.8 BENIGN PROSTATIC HYPERPLASIA WITH URINARY OBSTRUCTION: ICD-10-CM

## 2017-10-17 RX ORDER — TAMSULOSIN HYDROCHLORIDE 0.4 MG/1
0.8 CAPSULE ORAL DAILY
Qty: 180 CAPSULE | Refills: 1 | Status: SHIPPED | OUTPATIENT
Start: 2017-10-17 | End: 2018-06-09

## 2018-06-09 DIAGNOSIS — N40.1 BENIGN PROSTATIC HYPERPLASIA WITH URINARY OBSTRUCTION: ICD-10-CM

## 2018-06-09 DIAGNOSIS — N13.8 BENIGN PROSTATIC HYPERPLASIA WITH URINARY OBSTRUCTION: ICD-10-CM

## 2018-06-11 NOTE — TELEPHONE ENCOUNTER
"Routing refill request to provider for review/approval because:  Requested Prescriptions   Pending Prescriptions Disp Refills     tamsulosin (FLOMAX) 0.4 MG capsule [Pharmacy Med Name: TAMSULOSIN HCL CAPS 0.4MG] 180 capsule 1     Sig: TAKE 2 CAPSULES DAILY    Alpha Blockers Failed    6/9/2018  2:07 PM       Failed - Patient does not have Tadalafil, Vardenafil, or Sildenafil on their medication list       Passed - Blood pressure under 140/90 in past 12 months    BP Readings from Last 3 Encounters:   08/02/17 120/70   04/18/17 120/81   05/17/16 128/78                Passed - Recent (12 mo) or future (30 days) visit within the authorizing provider's specialty    Patient had office visit in the last 12 months or has a visit in the next 30 days with authorizing provider or within the authorizing provider's specialty.  See \"Patient Info\" tab in inbasket, or \"Choose Columns\" in Meds & Orders section of the refill encounter.           Passed - Patient is 18 years of age or older                  "

## 2018-06-12 RX ORDER — TAMSULOSIN HYDROCHLORIDE 0.4 MG/1
CAPSULE ORAL
Qty: 180 CAPSULE | Refills: 0 | Status: SHIPPED | OUTPATIENT
Start: 2018-06-12 | End: 2018-10-05

## 2018-06-12 NOTE — PROGRESS NOTES
"HPI:    Froy is a 77 year old male here to discuss:    Exertional shortness of breath - present for 8-10 months. Gets short of breath more than usual when he climbs a flight of stairs. No chest pain. No cough. No dizziness or palpitations.  Evaluation and treatment:   In 2013 stress echo negative   EKG today RBBB    CXR, Stress echo    BPH/adrenal tumor - symptoms include polyuria and nocturia - previously has to get up at night every 2-3 hours to urinate (now down to 1-2 per night). There is some discomfort with urination. There is hesitation, difficult to start. Has to do \"go back\" sometimes. No gross hematuria but has had microscopic hematuria. Exam 9/18/14 showed a very large prostate.   Evaluation and treatment:    He has seen urology. U/S showed hydronephrosis but CT did not. 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    Surgical intervention for the adrenal tumor and BPH were discussed but with urology but decided against it.    Flomax 0.8 mg - has been helpful without side effects.   Finasteride 5 mg qd - has been helpful without side effects.     PSA previously borderline but has come down since.      PSA   Date Value Ref Range Status   08/02/2017 1.60 0 - 4 ug/L Final     Comment:     Assay Method:  Chemiluminescence using Siemens Vista analyzer         PSA    Date  Value  Ref Range  Status    04/01/2015  1.82  0 - 4 ug/L  Final      Sinus tachycardia - asymptomatic. Review of his records showed high pulse rate off and on. Stress echo 7/5/13 normal.   continue to monitor.     TSH    Date  Value  Ref Range  Status    04/01/2015  1.24  0.40 - 4.00 mU/L  Final      Comment:      Effective 7/30/2014, the reference range for this assay has changed to reflect  new instrumentation/methodology.       Esophageal ring - EGD 7/18/11 showed schatzki ring with negative h pylori. He is asymptomatic at this time.    Insomnia - it takes a long time to fall asleep. Trazodone caused drowsiness in AM. He is ok now.    ED " "- Erection are poor. He has tried Levitra in the past which caused dizziness. I don't know if he tried Cialis.     Pectus excavatum - asymptomatic.    Precancerous lesion on chest - per patient. Scar is noted.    Previous surgeries - right knee surgery.     Preventive: advised to get Shingrix at our pharmacy.    Immunization History   Administered Date(s) Administered     Influenza (High Dose) 3 valent vaccine 09/18/2014     Pneumo Conj 13-V (2010&after) 08/02/2017     Pneumococcal 23 valent 06/23/2011     TD (ADULT, 7+) 05/11/2006     TDAP Vaccine (Boostrix) 10/25/2013     Zoster vaccine, live 04/22/2016     Colonoscopy: 3/29/12 - repeat in 10 years    Advanced Directive: on file from 2011    Vit D Geriatrics Society Guidelines: Older adults should consume 4000 IU of Vit D daily from all sources. This will achieve serum level of 30. No need to test unless obese, malabsorption etc. You get only 100 units per glass of milk. Advised to take 2000 units daily.    ROS:    Const: No fevers, weight changes or night sweats recently.  ENT: No runny nose, sore throat or ear pain.  Resp: No cough or shortness of breath.  CV: No chest pain, dizziness or cardiac palpitations.  GI: No nausea, vomiting, diarrhea or constipation. Denies blood in stools or black stools.  : No dysuria, frequency or hematuria.  The rest of the ROS negative, other than listed on HPI        SH:    Marital status:   Kids: 2  Employment: retired   Exercise: walking daily, bike sometimes  Tobacco: denies  Etoh: denies  Recreational drugs: denies  Caffeine: 1 pop per week    Exam:    /82 (Cuff Size: Adult Regular)  Pulse 78  Temp 97.6  F (36.4  C) (Oral)  Resp 12  Ht 5' 6.25\" (1.683 m)  Wt 151 lb (68.5 kg)  SpO2 96%  BMI 24.19 kg/m2    Gen: Healthy appearing male in no acute distress  Eyes: Conjunctiva and sclera normal. Pupils react normally to light. No nystagmus.  Neck: No enlarged lymph nodes, thyromegally or " other masses.  Lungs: Good air movement and otherwise clear.  CV: Heart RRR with no murmurs. No JVD, carotid bruits or leg edema.  Previous exam showed large prostate.   MS: Pectus excavatum.    Assessment and Plan - Decision Making    1. Exertional shortness of breath  Per HPI  - EKG 12-lead complete w/read - Clinics  - XR Chest 2 Views  - Exercise Stress Echocardiogram; Future      Written instructions given as follows:    Patient Instructions   1. Set up the stress test - [phone].    2. I will contact you with the results via My Chart. If you need help logging in to My Chart, please call [phone].    3. If all is well, see you in July for a physical with fasting labs.

## 2018-06-14 ENCOUNTER — RADIANT APPOINTMENT (OUTPATIENT)
Dept: GENERAL RADIOLOGY | Facility: CLINIC | Age: 78
End: 2018-06-14
Attending: FAMILY MEDICINE
Payer: COMMERCIAL

## 2018-06-14 ENCOUNTER — OFFICE VISIT (OUTPATIENT)
Dept: FAMILY MEDICINE | Facility: CLINIC | Age: 78
End: 2018-06-14
Payer: COMMERCIAL

## 2018-06-14 VITALS
TEMPERATURE: 97.6 F | HEIGHT: 66 IN | RESPIRATION RATE: 12 BRPM | DIASTOLIC BLOOD PRESSURE: 82 MMHG | BODY MASS INDEX: 24.27 KG/M2 | SYSTOLIC BLOOD PRESSURE: 139 MMHG | WEIGHT: 151 LBS | OXYGEN SATURATION: 96 % | HEART RATE: 78 BPM

## 2018-06-14 DIAGNOSIS — R06.02 EXERTIONAL SHORTNESS OF BREATH: Primary | ICD-10-CM

## 2018-06-14 PROCEDURE — 71046 X-RAY EXAM CHEST 2 VIEWS: CPT | Mod: FY

## 2018-06-14 PROCEDURE — 93000 ELECTROCARDIOGRAM COMPLETE: CPT | Performed by: FAMILY MEDICINE

## 2018-06-14 PROCEDURE — 99214 OFFICE O/P EST MOD 30 MIN: CPT | Performed by: FAMILY MEDICINE

## 2018-06-14 NOTE — NURSING NOTE
"Chief Complaint   Patient presents with     Shortness of Breath     when walking up stairs       Initial /82 (Cuff Size: Adult Regular)  Pulse 78  Temp 97.6  F (36.4  C) (Oral)  Resp 12  Ht 5' 6.25\" (1.683 m)  Wt 151 lb (68.5 kg)  SpO2 96%  BMI 24.19 kg/m2 Estimated body mass index is 24.19 kg/(m^2) as calculated from the following:    Height as of this encounter: 5' 6.25\" (1.683 m).    Weight as of this encounter: 151 lb (68.5 kg).      Abbey Argueta LPN    "

## 2018-06-14 NOTE — PATIENT INSTRUCTIONS
1. Set up the stress test - [phone].    2. I will contact you with the results via My Chart. If you need help logging in to My Chart, please call [phone].    3. If all is well, see you in July for a physical with fasting labs.

## 2018-06-14 NOTE — MR AVS SNAPSHOT
After Visit Summary   6/14/2018    Froy Loredo    MRN: [id number]           Patient Information     Date Of Birth          1940        Visit Information        Provider Department      6/14/2018 12:10 PM Bj Hull MD [facility]        Today's Diagnoses     Exertional shortness of breath    -  1      Care Instructions    1. Set up the stress test - [phone].    2. I will contact you with the results via My Chart. If you need help logging in to My Chart, please call [phone].    3. If all is well, see you in July for a physical with fasting labs.          Follow-ups after your visit        Future tests that were ordered for you today     Open Future Orders        Priority Expected Expires Ordered    Exercise Stress Echocardiogram Routine  6/14/2019 6/14/2018            Who to contact     If you have questions or need follow up information about today's clinic visit or your schedule please contact [facility] directly at [phone].  Normal or non-critical lab and imaging results will be communicated to you by Walmoohart, letter or phone within 4 business days after the clinic has received the results. If you do not hear from us within 7 days, please contact the clinic through [email or web address] or phone. If you have a critical or abnormal lab result, we will notify you by phone as soon as possible.  Submit refill requests through [email or web address] or call your pharmacy and they will forward the refill request to us. Please allow 3 business days for your refill to be completed.          Additional Information About Your Visit        [employer] Information     [email or web address] gives you secure access to your electronic health record. If you see a primary care provider, you can also send messages to your care team and make appointments. If you have questions, please call your primary care clinic.  If you do not have a primary care provider, please call [phone] and they will assist  "you.        Care EveryWhere ID     This is your Care EveryWhere ID. This could be used by other organizations to access your [facility] medical records  [id number]        Your Vitals Were     Pulse Temperature Respirations Height Pulse Oximetry BMI (Body Mass Index)    78 97.6  F (36.4  C) (Oral) 12 5' 6.25\" (1.683 m) 96% 24.19 kg/m2       Blood Pressure from Last 3 Encounters:   06/14/18 139/82   08/02/17 120/70   04/18/17 120/81    Weight from Last 3 Encounters:   06/14/18 151 lb (68.5 kg)   08/02/17 150 lb (68 kg)   07/03/17 154 lb (69.9 kg)              We Performed the Following     EKG 12-lead complete w/read - Clinics     XR Chest 2 Views        Primary Care Provider Office Phone # Fax #    Bj Hull [phone] [address] [facility] [address]        Equal Access to Services     ZACH PINZON : Hadii aad ku hadasho Soomaali, waaxda luqadaha, qaybta kaalmada adeegyada, waxay idiin hayaan adeeg kharahola la'vinhn . So [facility] [phone].    ATENCIÓN: Si habla español, tiene a kolb disposición servicios gratuitos de asistencia lingüística. [facility] [phone].    We comply with applicable federal civil rights laws and [place] laws. We do not discriminate on the basis of race, color, national origin, age, disability, sex, sexual orientation, or gender identity.            Thank you!     Thank you for choosing [facility] [place] [facility]  for your care. Our goal is always to provide you with excellent care. Hearing back from our patients is one way we can continue to improve our services. Please take a few minutes to complete the written survey that you may receive in the mail after your visit with us. Thank you!             Your Updated Medication List - Protect others around you: Learn how to safely use, store and throw away your medicines at www.disposemymeds.org.          This list is accurate as of 6/14/18 12:37 PM.  Always use your most recent med list.                   Brand " Name Dispense Instructions for use Diagnosis    finasteride 5 MG tablet    PROSCAR    90 tablet    Take 1 tablet (5 mg) by mouth daily    Benign prostatic hyperplasia with urinary obstruction       MULTI-VITAMIN PO      1 tablet DAILY        polyethylene glycol 0.4%- propylene glycol 0.3% 0.4-0.3 % Soln ophthalmic solution    SYSTANE ULTRA     Place 1 drop into both eyes 2 times daily    Dry eyes, bilateral       sulindac 200 MG tablet    CLINORIL    60 tablet    Take 1 tablet (200 mg) by mouth 2 times daily    Primary osteoarthritis of left hip       tamsulosin 0.4 MG capsule    FLOMAX    180 capsule    TAKE 2 CAPSULES DAILY    Benign prostatic hyperplasia with urinary obstruction       VITAMIN C PO      2 TABLETS DAILY

## 2018-06-26 ENCOUNTER — RADIANT APPOINTMENT (OUTPATIENT)
Dept: CARDIOLOGY | Facility: CLINIC | Age: 78
End: 2018-06-26
Attending: FAMILY MEDICINE
Payer: COMMERCIAL

## 2018-06-26 DIAGNOSIS — R06.02 EXERTIONAL SHORTNESS OF BREATH: ICD-10-CM

## 2018-06-26 PROCEDURE — 93321 DOPPLER ECHO F-UP/LMTD STD: CPT | Mod: 26 | Performed by: INTERNAL MEDICINE

## 2018-06-26 PROCEDURE — 93016 CV STRESS TEST SUPVJ ONLY: CPT | Performed by: INTERNAL MEDICINE

## 2018-06-26 PROCEDURE — 40000264 ECHO STRESS TEST WITH DEFINITY: Performed by: INTERNAL MEDICINE

## 2018-06-26 PROCEDURE — 93018 CV STRESS TEST I&R ONLY: CPT | Performed by: INTERNAL MEDICINE

## 2018-06-26 PROCEDURE — 93017 CV STRESS TEST TRACING ONLY: CPT | Performed by: INTERNAL MEDICINE

## 2018-06-26 PROCEDURE — 93350 STRESS TTE ONLY: CPT | Mod: TC | Performed by: INTERNAL MEDICINE

## 2018-06-26 PROCEDURE — 93325 DOPPLER ECHO COLOR FLOW MAPG: CPT | Mod: TC | Performed by: INTERNAL MEDICINE

## 2018-06-26 PROCEDURE — 93352 ADMIN ECG CONTRAST AGENT: CPT | Performed by: INTERNAL MEDICINE

## 2018-06-26 PROCEDURE — 93321 DOPPLER ECHO F-UP/LMTD STD: CPT | Mod: TC | Performed by: INTERNAL MEDICINE

## 2018-06-26 PROCEDURE — 93350 STRESS TTE ONLY: CPT | Mod: 26 | Performed by: INTERNAL MEDICINE

## 2018-06-26 PROCEDURE — 93325 DOPPLER ECHO COLOR FLOW MAPG: CPT | Mod: 26 | Performed by: INTERNAL MEDICINE

## 2018-07-12 ENCOUNTER — DOCUMENTATION ONLY (OUTPATIENT)
Dept: LAB | Facility: CLINIC | Age: 78
End: 2018-07-12

## 2018-07-12 DIAGNOSIS — Z00.00 ROUTINE HISTORY AND PHYSICAL EXAMINATION OF ADULT: ICD-10-CM

## 2018-07-12 DIAGNOSIS — Z13.6 CARDIOVASCULAR SCREENING; LDL GOAL LESS THAN 160: Primary | ICD-10-CM

## 2018-07-12 NOTE — PROGRESS NOTES
Patient has a lab appointment on 07/26/2018. Per the lab schedule scrubbing protocol they are not due for anything. Please review chart and send orders or let patient know appointment is not needed.    Thank you,  Candy Alas

## 2018-07-13 NOTE — PROGRESS NOTES
Please review lab orders sign and close encounter. Anastasia Flower MA/KATARZYNA    Physical 8/2/18

## 2018-07-26 DIAGNOSIS — Z00.00 ROUTINE HISTORY AND PHYSICAL EXAMINATION OF ADULT: ICD-10-CM

## 2018-07-26 DIAGNOSIS — Z13.6 CARDIOVASCULAR SCREENING; LDL GOAL LESS THAN 160: ICD-10-CM

## 2018-07-26 LAB
ANION GAP SERPL CALCULATED.3IONS-SCNC: 8 MMOL/L (ref 3–14)
BUN SERPL-MCNC: 25 MG/DL (ref 7–30)
CALCIUM SERPL-MCNC: 8.9 MG/DL (ref 8.5–10.1)
CHLORIDE SERPL-SCNC: 107 MMOL/L (ref 94–109)
CHOLEST SERPL-MCNC: 171 MG/DL
CO2 SERPL-SCNC: 25 MMOL/L (ref 20–32)
CREAT SERPL-MCNC: 1.14 MG/DL (ref 0.66–1.25)
GFR SERPL CREATININE-BSD FRML MDRD: 62 ML/MIN/1.7M2
GLUCOSE SERPL-MCNC: 96 MG/DL (ref 70–99)
HDLC SERPL-MCNC: 70 MG/DL
LDLC SERPL CALC-MCNC: 88 MG/DL
NONHDLC SERPL-MCNC: 101 MG/DL
POTASSIUM SERPL-SCNC: 3.7 MMOL/L (ref 3.4–5.3)
SODIUM SERPL-SCNC: 140 MMOL/L (ref 133–144)
TRIGL SERPL-MCNC: 64 MG/DL

## 2018-07-26 PROCEDURE — 80048 BASIC METABOLIC PNL TOTAL CA: CPT | Performed by: FAMILY MEDICINE

## 2018-07-26 PROCEDURE — 36415 COLL VENOUS BLD VENIPUNCTURE: CPT | Performed by: FAMILY MEDICINE

## 2018-07-26 PROCEDURE — 80061 LIPID PANEL: CPT | Performed by: FAMILY MEDICINE

## 2018-08-02 ENCOUNTER — OFFICE VISIT (OUTPATIENT)
Dept: FAMILY MEDICINE | Facility: CLINIC | Age: 78
End: 2018-08-02
Payer: COMMERCIAL

## 2018-08-02 VITALS
RESPIRATION RATE: 16 BRPM | SYSTOLIC BLOOD PRESSURE: 135 MMHG | WEIGHT: 153 LBS | HEIGHT: 66 IN | BODY MASS INDEX: 24.59 KG/M2 | HEART RATE: 92 BPM | DIASTOLIC BLOOD PRESSURE: 76 MMHG | TEMPERATURE: 98.2 F | OXYGEN SATURATION: 97 %

## 2018-08-02 DIAGNOSIS — Z00.00 ROUTINE GENERAL MEDICAL EXAMINATION AT A HEALTH CARE FACILITY: Primary | ICD-10-CM

## 2018-08-02 DIAGNOSIS — L29.9 ITCHING OF EAR: ICD-10-CM

## 2018-08-02 PROCEDURE — G0439 PPPS, SUBSEQ VISIT: HCPCS | Performed by: FAMILY MEDICINE

## 2018-08-02 NOTE — NURSING NOTE
"Chief Complaint   Patient presents with     Physical       Initial /76 (Cuff Size: Adult Regular)  Pulse 92  Temp 98.2  F (36.8  C) (Oral)  Resp 16  Ht 5' 6.25\" (1.683 m)  Wt 153 lb (69.4 kg)  SpO2 97%  BMI 24.51 kg/m2 Estimated body mass index is 24.51 kg/(m^2) as calculated from the following:    Height as of this encounter: 5' 6.25\" (1.683 m).    Weight as of this encounter: 153 lb (69.4 kg).      Abbey Argueta LPN    "

## 2018-08-02 NOTE — PROGRESS NOTES
SUBJECTIVE:   Froy Loredo is a 77 year old male who presents for Preventive Visit.    Are you in the first 12 months of your Medicare Part B coverage?  No      Ability to successfully perform activities of daily living: Yes, no assistance needed    Home safety:  none identified     Hearing impairment: No    Fall risk:         COGNITIVE SCREEN  1) Repeat 3 items (Leader, Season, Table)    2) Clock draw: NORMAL  3) 3 item recall: Recalls 3 objects  Results: NORMAL clock, 1-2 items recalled: COGNITIVE IMPAIRMENT LESS LIKELY    Mini-CogTM Copyright GLENIS Gloria. Licensed by the author for use in [facility]; reprinted with permission (amber@[email or web address]). All rights reserved.            Reviewed and updated as needed this visit by clinical staff         Reviewed and updated as needed this visit by Provider        Social History   Substance Use Topics     Smoking status: Never Smoker     Smokeless tobacco: Never Used      Comment: Lives in smoke free household     Alcohol use No       If you drink alcohol do you typically have >3 drinks per day or >7 drinks per week? No            Right ear itching - exam is normal today.  Evaluation and treatment:    He has tried over the counter HC cream without relief.   Refer to ENT    Exertional shortness of breath - present since late 2017. Gets short of breath more than usual when he climbs a flight of stairs. No chest pain. No cough. No wheezing. No dizziness or palpitations. No h/o smoking, COPD or asthma.  Evaluation and treatment:   He has pectus excavatum - does this contribute?   In 2013 stress echo negative   EKG previously RBBB    CXR 6/14/18 negative.   Repeat stress echo 6/26/18 negative.   I reassured him and I would not pursue any further evaluation.   But if he was still worried or if his symptoms got worse, I offered him CT angiogram, referral to cardiology and/or referral to pulmonology.    BPH/adrenal tumor - symptoms include polyuria and nocturia -  "previously has to get up at night every 2-3 hours to urinate (now down to 1-2 per night). There is some discomfort with urination. There is hesitation, difficult to start. Has to do \"go back\" sometimes. No gross hematuria but has had microscopic hematuria.   Evaluation and treatment:    Exam 9/18/14 showed a very large prostate.    He has seen urology. U/S showed hydronephrosis but CT did not. 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    Surgical intervention for the adrenal tumor and BPH were discussed but with urology but decided against it.    Flomax 0.8 mg - has been helpful without side effects.   Finasteride 5 mg qd - has been helpful without side effects.     PSA previously borderline but has come down since.    PSA   Date Value Ref Range Status   08/02/2017 1.60 0 - 4 ug/L Final     Comment:     Assay Method:  Chemiluminescence using Siemens Vista analyzer       PSA    Date  Value  Ref Range  Status    04/01/2015  1.82  0 - 4 ug/L  Final        Esophageal ring - EGD 7/18/11 showed schatzki ring with negative h pylori. He is asymptomatic at this time.    Insomnia - it takes a long time to fall asleep. Trazodone caused drowsiness in AM. He is ok now.    ED - Erection are poor. Libido is fine.  Evaluation and treatment:    He has tried Levitra in the past which caused dizziness. I don't know if he tried Cialis.     Pectus excavatum - asymptomatic.    Precancerous lesion on chest - per patient. Scar is noted.    Previous surgeries - right knee surgery.     Preventive: advised to get Shingrix at our pharmacy.     Immunization History   Administered Date(s) Administered     Influenza (High Dose) 3 valent vaccine 09/18/2014     Pneumo Conj 13-V (2010&after) 08/02/2017     Pneumococcal 23 valent 06/23/2011     TD (ADULT, 7+) 05/11/2006     TDAP Vaccine (Boostrix) 10/25/2013     Zoster vaccine, live 04/22/2016     Colonoscopy: 3/29/12 - repeat in 10 years    Advanced Directive: on file from 2011    Vit D " Geriatrics Society Guidelines: Older adults should consume 4000 IU of Vit D daily from all sources. This will achieve serum level of 30. No need to test unless obese, malabsorption etc. You get only 100 units per glass of milk. Advised to take 2000 units daily.    Last Comprehensive Metabolic Panel:  Sodium   Date Value Ref Range Status   07/26/2018 140 133 - 144 mmol/L Final     Potassium   Date Value Ref Range Status   07/26/2018 3.7 3.4 - 5.3 mmol/L Final     Chloride   Date Value Ref Range Status   07/26/2018 107 94 - 109 mmol/L Final     Carbon Dioxide   Date Value Ref Range Status   07/26/2018 25 20 - 32 mmol/L Final     Anion Gap   Date Value Ref Range Status   07/26/2018 8 3 - 14 mmol/L Final     Glucose   Date Value Ref Range Status   07/26/2018 96 70 - 99 mg/dL Final     Comment:     Fasting specimen     Urea Nitrogen   Date Value Ref Range Status   07/26/2018 25 7 - 30 mg/dL Final     Creatinine   Date Value Ref Range Status   07/26/2018 1.14 0.66 - 1.25 mg/dL Final     GFR Estimate   Date Value Ref Range Status   07/26/2018 62 >60 mL/min/1.7m2 Final     Comment:     Non  GFR Calc     Calcium   Date Value Ref Range Status   07/26/2018 8.9 8.5 - 10.1 mg/dL Final     Recent Labs   Lab Test  07/26/18   0756  07/28/17   1015   09/11/14   0848  06/24/13   0947   CHOL  171  166   --   198  206*   HDL  70  65   --   61  49   LDL  88  89   < >  119  130*   TRIG  64  62   --   88  137   CHOLHDLRATIO   --    --    --   3.2  4.2    < > = values in this interval not displayed.           SH:    Marital status:   Kids: 2  Employment: retired   Exercise: walking daily, bike sometimes  Tobacco: denies  Etoh: denies  Recreational drugs: denies  Caffeine: 1 pop per week                  Today's PHQ-2 Score:   PHQ-2 ( 1999 Pfizer) 8/2/2017 1/26/2016   Q1: Little interest or pleasure in doing things 0 0   Q2: Feeling down, depressed or hopeless 0 0   PHQ-2 Score 0 0   Q1: Little  "interest or pleasure in doing things - -   Q2: Feeling down, depressed or hopeless - -   PHQ-2 Score - -       Do you feel safe in your environment - Yes    Do you have a Health Care Directive?: Yes: Advance Directive has been received and scanned.    Current providers sharing in care for this patient include:   Patient Care Team:  Bj Hull MD as PCP - General (Family Practice)  Lise Sahni RN as     The following health maintenance items are reviewed in Epic and correct as of today:  Health Maintenance   Topic Date Due     EYE EXAM Q1 YEAR  05/10/2017     FALL RISK ASSESSMENT  08/02/2018     PHQ-2 Q1 YR  08/02/2018     INFLUENZA VACCINE (1) 09/01/2018     ADVANCE DIRECTIVE PLANNING Q5 YRS  09/18/2019     PSA Q3 YR  08/02/2020     LIPID SCREEN Q5 YR MALE (SYSTEM ASSIGNED)  07/26/2023     TETANUS IMMUNIZATION (SYSTEM ASSIGNED)  10/25/2023     PNEUMOCOCCAL  Completed         ROS:  CONSTITUTIONAL: NEGATIVE for fever, chills, change in weight  INTEGUMENTARY/SKIN: NEGATIVE for worrisome rashes, moles or lesions  EYES: NEGATIVE for vision changes or irritation  ENT/MOUTH: NEGATIVE for ear, mouth and throat problems  RESP: per HPI  BREAST: NEGATIVE for masses, tenderness or discharge  CV: NEGATIVE for chest pain, palpitations or peripheral edema  GI: NEGATIVE for nausea, abdominal pain, heartburn, or change in bowel habits  : per HPI  MUSCULOSKELETAL: NEGATIVE for significant arthralgias or myalgia  NEURO: NEGATIVE for weakness, dizziness or paresthesias  ENDOCRINE: NEGATIVE for temperature intolerance, skin/hair changes  HEME: NEGATIVE for bleeding problems  PSYCHIATRIC: NEGATIVE for changes in mood or affect    OBJECTIVE:   /76 (Cuff Size: Adult Regular)  Pulse 92  Temp 98.2  F (36.8  C) (Oral)  Resp 16  Ht 5' 6.25\" (1.683 m)  Wt 153 lb (69.4 kg)  SpO2 97%  BMI 24.51 kg/m2 Estimated body mass index is 24.51 kg/(m^2) as calculated from the following:    Height as of this " "encounter: 5' 6.25\" (1.683 m).    Weight as of this encounter: 153 lb (69.4 kg).  EXAM:   GENERAL: healthy, alert and no distress  EYES: Eyes grossly normal to inspection, PERRL and conjunctivae and sclerae normal  HENT: ear canals and TM's normal, nose and mouth without ulcers or lesions  NECK: no adenopathy, no asymmetry, masses, or scars and thyroid normal to palpation  RESP: lungs clear to auscultation - no rales, rhonchi or wheezes  CV: regular rate and rhythm, normal S1 S2, no S3 or S4, no murmur, click or rub, no peripheral edema and peripheral pulses strong  ABDOMEN: soft, nontender, no hepatosplenomegaly, no masses and bowel sounds normal  MS: no gross musculoskeletal defects noted, no edema. Pectus excavatum.  SKIN: no suspicious lesions or rashes  NEURO: Normal strength and tone, mentation intact and speech normal  PSYCH: mentation appears normal, affect normal/bright    ASSESSMENT / PLAN:       End of Life Planning:  Patient currently has an advanced directive: Yes.  Practitioner is supportive of decision.    COUNSELING:  Reviewed preventive health counseling, as reflected in patient instructions       Regular exercise       Healthy diet/nutrition    BP Readings from Last 1 Encounters:   08/02/18 135/76     Estimated body mass index is 24.51 kg/(m^2) as calculated from the following:    Height as of this encounter: 5' 6.25\" (1.683 m).    Weight as of this encounter: 153 lb (69.4 kg).           reports that he has never smoked. He has never used smokeless tobacco.      Appropriate preventive services were discussed with this patient, including applicable screening as appropriate for cardiovascular disease, diabetes, osteopenia/osteoporosis, and glaucoma.  As appropriate for age/gender, discussed screening for colorectal cancer, prostate cancer, breast cancer, and cervical cancer. Checklist reviewing preventive services available has been given to the patient.    Reviewed patients plan of care and provided " an AVS. The Basic Care Plan (routine screening as documented in Health Maintenance) for Froy meets the Care Plan requirement. This Care Plan has been established and reviewed with the Patient.    Assessment and Plan - Decision Making    1. Routine general medical examination at a health care facility  Results of today's exam given to patient verbally along with age appropriate preventive measures. Written instructions reviewed and handed to the patient.      2. Itching of ear  Per HPI  - OTOLARYNGOLOGY REFERRAL      Written instructions given as follows:    Patient Instructions   1. I recommend including veggies, fresh fruits (3 to 5 servings), nuts (unsalted) in your daily diet. Cut down on carbs like bread, pasta, rice, potatoes. Cut down on red meats like burgers etc. Increase healthy proteins like beans, tofu, tuna, chicken and turkey.    2. Keep up your exercise program.      3.  Avoid the sun or otherwise use sun screen - please look at the insert I gave you about melanoma.    4. See the dentist and eye doctor regularly.     5. Stop by our pharmacy and get the new Shingles vaccine.    6. If all is well, see you in one year for a physical with fasting labs.

## 2018-08-02 NOTE — MR AVS SNAPSHOT
After Visit Summary   8/2/2018    Froy Loredo    MRN: [id number]           Patient Information     Date Of Birth          1940        Visit Information        Provider Department      8/2/2018 10:50 AM Bj Hull MD [facility]        Today's Diagnoses     Routine general medical examination at a health care facility    -  1    Itching of ear          Care Instructions    1. I recommend including veggies, fresh fruits (3 to 5 servings), nuts (unsalted) in your daily diet. Cut down on carbs like bread, pasta, rice, potatoes. Cut down on red meats like burgers etc. Increase healthy proteins like beans, tofu, tuna, chicken and turkey.    2. Keep up your exercise program.      3.  Avoid the sun or otherwise use sun screen - please look at the insert I gave you about melanoma.    4. See the dentist and eye doctor regularly.     5. Stop by our pharmacy and get the new Shingles vaccine.    6. If all is well, see you in one year for a physical with fasting labs.          Follow-ups after your visit        Additional Services     OTOLARYNGOLOGY REFERRAL       Your provider has referred you to: [facility]: [facility] [phone]  [email or web address]    Please be aware that coverage of these services is subject to the terms and limitations of your health insurance plan.  Call member services at your health plan with any benefit or coverage questions.      Please bring the following with you to your appointment:    (1) Any X-Rays, CTs or MRIs which have been performed.  Contact the facility where they were done to arrange for  prior to your scheduled appointment.   (2) List of current medications  (3) This referral request   (4) Any documents/labs given to you for this referral                  Who to contact     If you have questions or need follow up information about today's clinic visit or your schedule please contact [facility]  [facility] directly at [phone].  Normal or non-critical lab and imaging results will be communicated to you by MyChart, letter or phone within 4 business days after the clinic has received the results. If you do not hear from us within 7 days, please contact the clinic through [employer] or phone. If you have a critical or abnormal lab result, we will notify you by phone as soon as possible.  Submit refill requests through Bit[employer]i or call your pharmacy and they will forward the refill request to us. Please allow 3 business days for your refill to be completed.          Additional Information About Your Visit        [employer] Information     [employer] gives you secure access to your electronic health record. If you see a primary care provider, you can also send messages to your care team and make appointments. If you have questions, please call your primary care clinic.  If you do not have a primary care provider, please call [phone] and they will assist you.        Care EveryWhere ID     This is your Care EveryWhere ID. This could be used by other organizations to access your [facility] medical records  [id number]        Your Vitals Were     Pulse Temperature Respirations Height Pulse Oximetry BMI (Body Mass Index)    92 98.2  F (36.8  C) (Oral) 16 5' 6.25\" (1.683 m) 97% 24.51 kg/m2       Blood Pressure from Last 3 Encounters:   08/02/18 135/76   06/14/18 139/82   08/02/17 120/70    Weight from Last 3 Encounters:   08/02/18 153 lb (69.4 kg)   06/14/18 151 lb (68.5 kg)   08/02/17 150 lb (68 kg)              We Performed the Following     OTOLARYNGOLOGY REFERRAL     PAF COMPLETED        Primary Care Provider Office Phone # Fax #    Bj Hull [phone] [address] [address] [facility] [address]        Equal Access to Services     ZACH PINZON : Kermit jonaso Soelaine, waaxda luqadaha, qaybta kaalmada adeegyada, emmanuel kim. So [facility] " [phone].    ATENCIÓN: Si geni fountain, tiene a kolb disposición servicios gratuitos de asistencia lingüística. Joseluis mesa [phone].    We comply with applicable federal civil rights laws and [place] laws. We do not discriminate on the basis of race, color, national origin, age, disability, sex, sexual orientation, or gender identity.            Thank you!     Thank you for choosing [facility]  for your care. Our goal is always to provide you with excellent care. Hearing back from our patients is one way we can continue to improve our services. Please take a few minutes to complete the written survey that you may receive in the mail after your visit with us. Thank you!             Your Updated Medication List - Protect others around you: Learn how to safely use, store and throw away your medicines at www.disposemymeds.org.          This list is accurate as of 8/2/18 11:36 AM.  Always use your most recent med list.                   Brand Name Dispense Instructions for use Diagnosis    finasteride 5 MG tablet    PROSCAR    90 tablet    Take 1 tablet (5 mg) by mouth daily    Benign prostatic hyperplasia with urinary obstruction       MULTI-VITAMIN PO      1 tablet DAILY        polyethylene glycol 0.4%- propylene glycol 0.3% 0.4-0.3 % Soln ophthalmic solution    SYSTANE ULTRA     Place 1 drop into both eyes 2 times daily    Dry eyes, bilateral       sulindac 200 MG tablet    CLINORIL    60 tablet    Take 1 tablet (200 mg) by mouth 2 times daily    Primary osteoarthritis of left hip       tamsulosin 0.4 MG capsule    FLOMAX    180 capsule    TAKE 2 CAPSULES DAILY    Benign prostatic hyperplasia with urinary obstruction       VITAMIN C PO      2 TABLETS DAILY

## 2018-08-02 NOTE — PATIENT INSTRUCTIONS
1. I recommend including veggies, fresh fruits (3 to 5 servings), nuts (unsalted) in your daily diet. Cut down on carbs like bread, pasta, rice, potatoes. Cut down on red meats like burgers etc. Increase healthy proteins like beans, tofu, tuna, chicken and turkey.    2. Keep up your exercise program.      3.  Avoid the sun or otherwise use sun screen - please look at the insert I gave you about melanoma.    4. See the dentist and eye doctor regularly.     5. Stop by our pharmacy and get the new Shingles vaccine.    6. If all is well, see you in one year for a physical with fasting labs.

## 2018-08-24 ENCOUNTER — OFFICE VISIT (OUTPATIENT)
Dept: OTOLARYNGOLOGY | Facility: CLINIC | Age: 78
End: 2018-08-24
Payer: COMMERCIAL

## 2018-08-24 VITALS
RESPIRATION RATE: 18 BRPM | WEIGHT: 153.8 LBS | TEMPERATURE: 98.6 F | BODY MASS INDEX: 24.64 KG/M2 | SYSTOLIC BLOOD PRESSURE: 124 MMHG | DIASTOLIC BLOOD PRESSURE: 78 MMHG

## 2018-08-24 DIAGNOSIS — H60.8X1 CHRONIC ECZEMATOUS OTITIS EXTERNA OF RIGHT EAR: Primary | ICD-10-CM

## 2018-08-24 DIAGNOSIS — H61.23 BILATERAL IMPACTED CERUMEN: ICD-10-CM

## 2018-08-24 PROCEDURE — 69210 REMOVE IMPACTED EAR WAX UNI: CPT | Performed by: OTOLARYNGOLOGY

## 2018-08-24 PROCEDURE — 99203 OFFICE O/P NEW LOW 30 MIN: CPT | Mod: 25 | Performed by: OTOLARYNGOLOGY

## 2018-08-24 RX ORDER — FLUOCINOLONE ACETONIDE 0.11 MG/ML
OIL AURICULAR (OTIC)
Qty: 20 ML | Refills: 1 | Status: SHIPPED | OUTPATIENT
Start: 2018-08-24 | End: 2022-09-14

## 2018-08-24 NOTE — PROGRESS NOTES
Chief Complaint -right ear itching and foreign body sensation    History of Present Illness - Froy Loredo is a 77 year old male who presents to me today for right ear itching and irritation. No drainage. Left ear feels normal. No hearing concerns. They have noted symptoms for approximately 1 year.  Tried hydrocortisone cream, but didn't work. The patient denies otorrhea, and severe otalgia. Has a slight ache. No history of ear surgery or chronic ear disease. No ear problems in family.     Past Medical History -   Patient Active Problem List   Diagnosis     Benign prostatic hyperplasia with urinary obstruction     CARDIOVASCULAR SCREENING; LDL GOAL LESS THAN 160     Advanced directives, counseling/discussion     History of skin cancer     AK (actinic keratosis)     ED (erectile dysfunction)     Health Care Home     Lymphopenia     Carcinoma in situ     Right arm cellulitis     Microscopic hematuria     Gastroesophageal reflux disease without esophagitis     PSC (posterior subcapsular cataract), left       Current Medications -   Current Outpatient Prescriptions:      finasteride (PROSCAR) 5 MG tablet, Take 1 tablet (5 mg) by mouth daily, Disp: 90 tablet, Rfl: 3     MULTI-VITAMIN OR,  1 tablet DAILY, Disp: , Rfl:      polyethylene glycol 0.4%- propylene glycol 0.3% (SYSTANE ULTRA) 0.4-0.3 % SOLN ophthalmic solution, Place 1 drop into both eyes 2 times daily, Disp: , Rfl:      sulindac (CLINORIL) 200 MG tablet, Take 1 tablet (200 mg) by mouth 2 times daily, Disp: 60 tablet, Rfl: 11     tamsulosin (FLOMAX) 0.4 MG capsule, TAKE 2 CAPSULES DAILY, Disp: 180 capsule, Rfl: 0     VITAMIN C OR, 2 TABLETS DAILY, Disp: , Rfl:     Allergies -   Allergies   Allergen Reactions     Doxazosin      Lightheadedness 2013, resolved with switch to tamsulosin        Social History -   Social History     Social History     Marital status:      Spouse name: Radha     Number of children: 2     Years of education: N/A      Occupational History     Real estate appraisal Retired     [place] [employer]      Retired     Social History Main Topics     Smoking status: Never Smoker     Smokeless tobacco: Never Used      Comment: Lives in smoke free household     Alcohol use No     Drug use: No     Sexual activity: Yes     Partners: Female     Birth control/ protection: None     Other Topics Concern     Parent/Sibling W/ Cabg, Mi Or Angioplasty Before 65f 55m? No      Service Yes     Blood Transfusions No     Caffeine Concern No     Occupational Exposure No     Hobby Hazards No     Sleep Concern Yes     occazanaly     Stress Concern No     Weight Concern No     Special Diet No     Back Care No     Exercise Yes     Bike Helmet No     Seat Belt Yes     Self-Exams No     Social History Narrative       Family History -   Family History   Problem Relation Age of Onset     Diabetes Maternal Grandfather      90's     Other Cancer Other      C.A.D. No family hx of      Cancer No family hx of      Hypertension No family hx of      Cerebrovascular Disease No family hx of      Thyroid Disease No family hx of      Glaucoma No family hx of      Macular Degeneration No family hx of      Breast Cancer No family hx of      Prostate Cancer No family hx of      Depression No family hx of      Anxiety Disorder No family hx of      Mental Illness No family hx of      Substance Abuse No family hx of      Anesthesia Reaction No family hx of      Asthma No family hx of      Osteoperosis No family hx of      Genetic Disorder No family hx of      Obesity No family hx of      Unknown/Adopted No family hx of        Review of Systems - As per HPI and PMHx, otherwise 7 system review of the head and neck negative.    Physical Exam  /78 (Cuff Size: Adult Regular)  Temp 98.6  F (37  C) (Tympanic)  Resp 18  Wt 69.8 kg (153 lb 12.8 oz)  BMI 24.64 kg/m2  General - The patient is in no distress.  Alert and oriented to person and place, answers questions and cooperates  with examination appropriately.   Voice and Breathing - The patient was breathing comfortably without the use of accessory muscles. There was no wheezing, stridor, or stertor.  The patients voice was clear and strong.  Ears - he has some right ear conchal bowl erythema and edema going into the right lateral canal. The left ear canal was impacted with cerumen, but the right had a significant amount and was clean to fully visualize the ear canal and tympanic membrane. See below for the procedure. Once the cerumen was removed the tympanic membranes are normal in appearance, bony landmarks are intact.  No retraction, perforation, or masses.  No fluid or purulence was seen in the external canal or the middle ear. Some right eczematous ear canal.  Eyes - Extraocular movements intact. Sclera were not icteric or injected.  Neck - Palpation of the occipital, submental, submandibular, internal jugular chain, and supraclavicular nodes did not demonstrate any abnormal lymph nodes or masses. Parotid glands were without masses.  The trachea was mobile and midline.  Neurological - Cranial nerves 2 through 12 were grossly intact. House-Brackmann grade 1 out of 6 bilaterally.     Cerumen Removal    Physical Exam and Procedure  Ears - I positioned the patient in the examination chair in a semi-supine position. I used the binocular surgical microscope to perform cerumen removal.  On the right side, I began by using a cerumen loop to gently lift the edges of the cerumen mass away from the walls of the external canal.  Once I did this, I was able to pull away fragments of wax and debris.  I removed all the wax and debri. He has some mild erythema and likely eczema or seborrheic dermatitis right conchal bowl and lateral canal. The medial canal looks okay. The tympanic membrane was intact, no sign of perforation or middle ear effusion.    I turned my attention to the left side once again using the binocular surgical microscope to perform  cerumen removal.  I began by using a cerumen loop to gently lift the edges of the cerumen mass away from the walls of the external canal.  Once I did this, I was able to pull away fragments of wax and debris. I removed all wax and debri. The tympanic membrane was intact, no sign of perforation or middle ear effusion. No real erythema on this side.       Assessment and Plan - Froy Loredo is a 77 year old male who presents to me today with right ear itching for one year. Has chronic eczematous otitis or seborrheic dermatitis. Can try hydrocortisone for the lateral canal and conchal bowl and dermotic for the medial canal. If this fails may consider protopic.     Gera Castano MD  Otolaryngology  [facility]

## 2018-08-24 NOTE — MR AVS SNAPSHOT
After Visit Summary   8/24/2018    Froy Loredo    MRN: [id number]           Patient Information     Date Of Birth          1940        Visit Information        Provider Department      8/24/2018 10:00 AM Gera Castano MD [facility]        Today's Diagnoses     Chronic eczematous otitis externa of right ear    -  1    Bilateral impacted cerumen          Care Instructions    General Scheduling Information  To schedule your CT/MRI scan, please contact [place] [facility] at [phone]   [address] [place] [address]    To schedule your Surgery, please contact our Specialty Schedulers at [phone]    ENT Clinic Locations Clinic Hours Telephone Number     [facility]  [address]  [place] [address]   Tuesday:       8:00am -- 4:00pm    Wednesday:  8:00am - 4:00pm   To schedule an appointment with   Dr. Castano,   please contact our   Specialty Scheduling Department at:     [phone]       [facility]  [address] [place] [address]   Friday:          8:00am - 4:00pm         Urgent Care Locations Clinic Hours Telephone Numbers     [facility]  [address] [place] [address]     Monday-Friday:     11:00pm - 9:00pm    Saturday-Sunday:  9:00am - 5:00pm   [phone]     [facility]  [address] [place] [address]     Monday-Friday:      5:00pm - 9:00pm     Saturday-Sunday:  9:00am - 5:00pm   [phone]                   Follow-ups after your visit        Who to contact     If you have questions or need follow up information about today's clinic visit or your schedule please contact [facility] directly at [phone].  Normal or non-critical lab and imaging results will be communicated to you by MyChart, letter or phone within 4 business days after the clinic has received the results. If you do not hear from us within 7 days, please contact the clinic through MyChart or phone. If you  have a critical or abnormal lab result, we will notify you by phone as soon as possible.  Submit refill requests through [employer] or call your pharmacy and they will forward the refill request to us. Please allow 3 business days for your refill to be completed.          Additional Information About Your Visit        [employer] Information     [employer] gives you secure access to your electronic health record. If you see a primary care provider, you can also send messages to your care team and make appointments. If you have questions, please call your primary care clinic.  If you do not have a primary care provider, please call [phone] and they will assist you.        Care EveryWhere ID     This is your Care EveryWhere ID. This could be used by other organizations to access your [facility] medical records  [id number]        Your Vitals Were     Temperature Respirations BMI (Body Mass Index)             98.6  F (37  C) (Tympanic) 18 24.64 kg/m2          Blood Pressure from Last 3 Encounters:   08/24/18 124/78   08/02/18 135/76   06/14/18 139/82    Weight from Last 3 Encounters:   08/24/18 69.8 kg (153 lb 12.8 oz)   08/02/18 69.4 kg (153 lb)   06/14/18 68.5 kg (151 lb)              We Performed the Following     Remove Cerumen          Today's Medication Changes          These changes are accurate as of 8/24/18 11:14 AM.  If you have any questions, ask your nurse or doctor.               Start taking these medicines.        Dose/Directions    fluocinolone acetonide 0.01 % Oil   Used for:  Chronic eczematous otitis externa of right ear   Started by:  Gera Castano MD        Use 4-5 drops in right ear 1-2 times daily as needed for itching.   Quantity:  20 mL   Refills:  1            Where to get your medicines      These medications were sent to [facility] [place], [place] - [address] [facility], [address]  [address] [facility] [facility] [address], [facility] [address]     Phone:  [phone]     fluocinolone acetonide  0.01 % Oil                Primary Care Provider Office Phone # Fax #    Bj Hull [phone] [address] [facility] [address]        Equal Access to Services     ZACH PINZON : Kermit mensah pritesho Sominali, waaxda luqadaha, qaybta kaalmada adeegyada, emmanuel anton steve kim. So [facility] [phone].    ATENCIÓN: Si habla español, tiene a kolb disposición servicios gratuitos de asistencia lingüística. Llame al [phone].    We comply with applicable federal civil rights laws and [place] laws. We do not discriminate on the basis of race, color, national origin, age, disability, sex, sexual orientation, or gender identity.            Thank you!     Thank you for choosing [facility]  for your care. Our goal is always to provide you with excellent care. Hearing back from our patients is one way we can continue to improve our services. Please take a few minutes to complete the written survey that you may receive in the mail after your visit with us. Thank you!             Your Updated Medication List - Protect others around you: Learn how to safely use, store and throw away your medicines at www.disposemymeds.org.          This list is accurate as of 8/24/18 11:14 AM.  Always use your most recent med list.                   Brand Name Dispense Instructions for use Diagnosis    finasteride 5 MG tablet    PROSCAR    90 tablet    Take 1 tablet (5 mg) by mouth daily    Benign prostatic hyperplasia with urinary obstruction       fluocinolone acetonide 0.01 % Oil     20 mL    Use 4-5 drops in right ear 1-2 times daily as needed for itching.    Chronic eczematous otitis externa of right ear       MULTI-VITAMIN PO      1 tablet DAILY        polyethylene glycol 0.4%- propylene glycol 0.3% 0.4-0.3 % Soln ophthalmic solution    SYSTANE ULTRA     Place 1 drop into both eyes 2 times daily    Dry eyes, bilateral       sulindac 200 MG tablet    CLINORIL    60 tablet     Take 1 tablet (200 mg) by mouth 2 times daily    Primary osteoarthritis of left hip       tamsulosin 0.4 MG capsule    FLOMAX    180 capsule    TAKE 2 CAPSULES DAILY    Benign prostatic hyperplasia with urinary obstruction       VITAMIN C PO      2 TABLETS DAILY

## 2018-08-24 NOTE — LETTER
8/24/2018         RE: Froy Loredo  [address] [facility] [address]        Dear Colleague,    Thank you for referring your patient, Froy Loredo, to the [facility]. Please see a copy of my visit note below.    Chief Complaint -right ear itching and foreign body sensation    History of Present Illness - Froy Loredo is a 77 year old male who presents to me today for right ear itching and irritation. No drainage. Left ear feels normal. No hearing concerns. They have noted symptoms for approximately 1 year.  Tried hydrocortisone cream, but didn't work. The patient denies otorrhea, and severe otalgia. Has a slight ache. No history of ear surgery or chronic ear disease. No ear problems in family.     Past Medical History -   Patient Active Problem List   Diagnosis     Benign prostatic hyperplasia with urinary obstruction     CARDIOVASCULAR SCREENING; LDL GOAL LESS THAN 160     Advanced directives, counseling/discussion     History of skin cancer     AK (actinic keratosis)     ED (erectile dysfunction)     Health Care Home     Lymphopenia     Carcinoma in situ     Right arm cellulitis     Microscopic hematuria     Gastroesophageal reflux disease without esophagitis     PSC (posterior subcapsular cataract), left       Current Medications -   Current Outpatient Prescriptions:      finasteride (PROSCAR) 5 MG tablet, Take 1 tablet (5 mg) by mouth daily, Disp: 90 tablet, Rfl: 3     MULTI-VITAMIN OR,  1 tablet DAILY, Disp: , Rfl:      polyethylene glycol 0.4%- propylene glycol 0.3% (SYSTANE ULTRA) 0.4-0.3 % SOLN ophthalmic solution, Place 1 drop into both eyes 2 times daily, Disp: , Rfl:      sulindac (CLINORIL) 200 MG tablet, Take 1 tablet (200 mg) by mouth 2 times daily, Disp: 60 tablet, Rfl: 11     tamsulosin (FLOMAX) 0.4 MG capsule, TAKE 2 CAPSULES DAILY, Disp: 180 capsule, Rfl: 0     VITAMIN C OR, 2 TABLETS DAILY, Disp: , Rfl:     Allergies -   Allergies   Allergen Reactions      Doxazosin      Lightheadedness 2013, resolved with switch to tamsulosin        Social History -   Social History     Social History     Marital status:      Spouse name: Radha     Number of children: 2     Years of education: N/A     Occupational History     Real estate appraisal Retired     [place] [employer]      Retired     Social History Main Topics     Smoking status: Never Smoker     Smokeless tobacco: Never Used      Comment: Lives in smoke free household     Alcohol use No     Drug use: No     Sexual activity: Yes     Partners: Female     Birth control/ protection: None     Other Topics Concern     Parent/Sibling W/ Cabg, Mi Or Angioplasty Before 65f 55m? No      Service Yes     Blood Transfusions No     Caffeine Concern No     Occupational Exposure No     Hobby Hazards No     Sleep Concern Yes     occazanaly     Stress Concern No     Weight Concern No     Special Diet No     Back Care No     Exercise Yes     Bike Helmet No     Seat Belt Yes     Self-Exams No     Social History Narrative       Family History -   Family History   Problem Relation Age of Onset     Diabetes Maternal Grandfather      90's     Other Cancer Other      C.A.D. No family hx of      Cancer No family hx of      Hypertension No family hx of      Cerebrovascular Disease No family hx of      Thyroid Disease No family hx of      Glaucoma No family hx of      Macular Degeneration No family hx of      Breast Cancer No family hx of      Prostate Cancer No family hx of      Depression No family hx of      Anxiety Disorder No family hx of      Mental Illness No family hx of      Substance Abuse No family hx of      Anesthesia Reaction No family hx of      Asthma No family hx of      Osteoperosis No family hx of      Genetic Disorder No family hx of      Obesity No family hx of      Unknown/Adopted No family hx of        Review of Systems - As per HPI and PMHx, otherwise 7 system review of the head and neck negative.    Physical Exam  BP  124/78 (Cuff Size: Adult Regular)  Temp 98.6  F (37  C) (Tympanic)  Resp 18  Wt 69.8 kg (153 lb 12.8 oz)  BMI 24.64 kg/m2  General - The patient is in no distress.  Alert and oriented to person and place, answers questions and cooperates with examination appropriately.   Voice and Breathing - The patient was breathing comfortably without the use of accessory muscles. There was no wheezing, stridor, or stertor.  The patients voice was clear and strong.  Ears - he has some right ear conchal bowl erythema and edema going into the right lateral canal. The left ear canal was impacted with cerumen, but the right had a significant amount and was clean to fully visualize the ear canal and tympanic membrane. See below for the procedure. Once the cerumen was removed the tympanic membranes are normal in appearance, bony landmarks are intact.  No retraction, perforation, or masses.  No fluid or purulence was seen in the external canal or the middle ear. Some right eczematous ear canal.  Eyes - Extraocular movements intact. Sclera were not icteric or injected.  Neck - Palpation of the occipital, submental, submandibular, internal jugular chain, and supraclavicular nodes did not demonstrate any abnormal lymph nodes or masses. Parotid glands were without masses.  The trachea was mobile and midline.  Neurological - Cranial nerves 2 through 12 were grossly intact. House-Brackmann grade 1 out of 6 bilaterally.     Cerumen Removal    Physical Exam and Procedure  Ears - I positioned the patient in the examination chair in a semi-supine position. I used the binocular surgical microscope to perform cerumen removal.  On the right side, I began by using a cerumen loop to gently lift the edges of the cerumen mass away from the walls of the external canal.  Once I did this, I was able to pull away fragments of wax and debris.  I removed all the wax and debri. He has some mild erythema and likely eczema or seborrheic dermatitis right  conchal bowl and lateral canal. The medial canal looks okay. The tympanic membrane was intact, no sign of perforation or middle ear effusion.    I turned my attention to the left side once again using the binocular surgical microscope to perform cerumen removal.  I began by using a cerumen loop to gently lift the edges of the cerumen mass away from the walls of the external canal.  Once I did this, I was able to pull away fragments of wax and debris. I removed all wax and debri. The tympanic membrane was intact, no sign of perforation or middle ear effusion. No real erythema on this side.       Assessment and Plan - Froy Loredo is a 77 year old male who presents to me today with right ear itching for one year. Has chronic eczematous otitis or seborrheic dermatitis. Can try hydrocortisone for the lateral canal and conchal bowl and dermotic for the medial canal. If this fails may consider protopic.     Gera Castano MD  Otolaryngology  [facility]      Again, thank you for allowing me to participate in the care of your patient.        Sincerely,        Gera Castano MD

## 2018-08-24 NOTE — PATIENT INSTRUCTIONS
General Scheduling Information  To schedule your CT/MRI scan, please contact [place] [facility] at [phone]   [address] [place] [address]    To schedule your Surgery, please contact our Specialty Schedulers at [phone]    ENT Clinic Locations Clinic Hours Telephone Number     [facility]  [address] [place] [address]   Tuesday:       8:00am -- 4:00pm    Wednesday:  8:00am - 4:00pm   To schedule an appointment with   Dr. Castano,   please contact our   Specialty Scheduling Department at:     [phone]       [facility]  [address] [place] [address]   Friday:          8:00am - 4:00pm         Urgent Care Locations Clinic Hours Telephone Numbers     [facility]  [address] [place] [address]     Monday-Friday:     11:00pm - 9:00pm    Saturday-Sunday:  9:00am - 5:00pm   [phone]     [facility]  [address] [place] [address]     Monday-Friday:      5:00pm - 9:00pm     Saturday-Sunday:  9:00am - 5:00pm   [phone]

## 2018-09-04 ENCOUNTER — OFFICE VISIT (OUTPATIENT)
Dept: FAMILY MEDICINE | Facility: CLINIC | Age: 78
End: 2018-09-04
Payer: COMMERCIAL

## 2018-09-04 VITALS
WEIGHT: 153 LBS | TEMPERATURE: 97.9 F | BODY MASS INDEX: 24.51 KG/M2 | SYSTOLIC BLOOD PRESSURE: 156 MMHG | OXYGEN SATURATION: 99 % | DIASTOLIC BLOOD PRESSURE: 77 MMHG | HEART RATE: 84 BPM

## 2018-09-04 DIAGNOSIS — M26.629 TMJ TENDERNESS: ICD-10-CM

## 2018-09-04 DIAGNOSIS — H92.01 OTALGIA, RIGHT: Primary | ICD-10-CM

## 2018-09-04 PROCEDURE — 99214 OFFICE O/P EST MOD 30 MIN: CPT | Performed by: PHYSICIAN ASSISTANT

## 2018-09-04 NOTE — PROGRESS NOTES
SUBJECTIVE:   Froy Loredo is a 77 year old male who presents to clinic today for the following health issues:      Patient c/o intermittent right ear pain since appt with Dr. Csatano on 8/24/18. No other sx. in fluocinolone eardrops.  The itching has resolved.  However he has noted some right ear aching that comes and goes.  He especially notes it after eating. Possible mild HA with it. No fever.  No vision changes.  He states he has never been told he has TMJ problems.  He is also you wondering if he should use Debrox earwax softener.          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   MULTI-VITAMIN OR 1 tablet DAILY   polyethylene glycol 0.4%- propylene glycol 0.3% (SYSTANE ULTRA) 0.4-0.3 % SOLN ophthalmic solution Place 1 drop into both eyes 2 times daily   sulindac (CLINORIL) 200 MG tablet Take 1 tablet (200 mg) by mouth 2 times daily   tamsulosin (FLOMAX) 0.4 MG capsule TAKE 2 CAPSULES DAILY   VITAMIN C OR 2 TABLETS DAILY     No current facility-administered medications on file prior to visit.     Social History   Substance Use Topics     Smoking status: Never Smoker     Smokeless tobacco: Never Used      Comment: Lives in smoke free household     Alcohol use No       ROS:  CONSTITUTIONAL: Negative  for fatigue or fever.  EYES: Negative for eye problems.  ENT: As above.  RESP: As above.  CV: Negative for chest pains.  GI: Negative for vomiting.  MUSCULOSKELETAL:  Negative for significant muscle or joint pains.  NEUROLOGIC: Negative for headaches.  SKIN: Negative for rash.    OBJECTIVE:  /77  Pulse 84  Temp 97.9  F (36.6  C) (Oral)  Wt 153 lb (69.4 kg)  SpO2 99%  BMI 24.51 kg/m2  GENERAL APPEARANCE: Healthy, alert and no distress.  EYES:Conjunctiva/sclera clear.  EARS: No cerumen.  Right bottom ear canal is with mild pinkness and mild inflammation. No e/o infection. No drainage or earwax. TM's intact w/o erythema.    NOSE/MOUTH: Nose without ulcers, erythema or lesions.  SINUSES: No maxillary sinus tenderness.  THROAT: No erythema w/o tonsillar enlargement . No exudates.  NECK: Supple, nontender, no lymphadenopathy.  RESP: Lungs clear to auscultation - no rales, rhonchi or wheezes  CV: Regular rate and rhythm, normal S1 S2, no murmur noted.  NEURO: Awake, alert    SKIN: No rashes  TMJs-right TMJ is tender to palpation. The temple itself is non tender. He does seem to have limited opening and closing of mouth.      ASSESSMENT:     ICD-10-CM    1. Otalgia, right H92.01    2. TMJ tenderness M26.629      PLAN:    We will discuss with ENT Dr. Castano later today when he  he is in clinic.  I suspect this could be a TMJ problem rather than any issue with his ear.  His ear canal does not look bad to me today. His ear canal does not look bad to me today.    Dr. Castano's note did discuss possible protopic if the topical steroid did not help.  Will call patient after discussing with his ENT.  Discussed with Dr. Castano.  Sounds like TMJ as he mainly notices after eating. Heat packs, soft diet, nsaids. F/U with Dr. Castano 2 weeks if still occurring, sooner if worsens. Relayed to patient. Do not use Debrox as no wax noted today.   RTC if any worsening symptoms or if not improving.    Alma Rosa Christian PA-C

## 2018-09-04 NOTE — MR AVS SNAPSHOT
After Visit Summary   9/4/2018    Froy Loredo    MRN: [id number]           Patient Information     Date Of Birth          1940        Visit Information        Provider Department      9/4/2018 11:20 AM Alma Rosa Christian PA-C [facility]        Today's Diagnoses     Otalgia, right    -  1    TMJ tenderness           Follow-ups after your visit        Who to contact     If you have questions or need follow up information about today's clinic visit or your schedule please contact [facility] directly at [phone].  Normal or non-critical lab and imaging results will be communicated to you by [employer], letter or phone within 4 business days after the clinic has received the results. If you do not hear from us within 7 days, please contact the clinic through [employer] or phone. If you have a critical or abnormal lab result, we will notify you by phone as soon as possible.  Submit refill requests through [employer] or call your pharmacy and they will forward the refill request to us. Please allow 3 business days for your refill to be completed.          Additional Information About Your Visit        MyChart Information     [employer] gives you secure access to your electronic health record. If you see a primary care provider, you can also send messages to your care team and make appointments. If you have questions, please call your primary care clinic.  If you do not have a primary care provider, please call [phone] and they will assist you.        Care EveryWhere ID     This is your Care EveryWhere ID. This could be used by other organizations to access your [facility] medical records  [id number]        Your Vitals Were     Pulse Temperature Pulse Oximetry BMI (Body Mass Index)          84 97.9  F (36.6  C) (Oral) 99% 24.51 kg/m2         Blood Pressure from Last 3 Encounters:   09/04/18 156/77   08/24/18 124/78   08/02/18 135/76    Weight from Last 3 Encounters:    09/04/18 153 lb (69.4 kg)   08/24/18 153 lb 12.8 oz (69.8 kg)   08/02/18 153 lb (69.4 kg)              Today, you had the following     No orders found for display       Primary Care Provider Office Phone # Fax #    Bj Hull [phone] [address] [facility] [address]        Equal Access to Services     ZACH PINZON : Hadii aad ku hadasho Soomaali, waaxda luqadaha, qaybta kaalmada adeegyada, waxay idiin hayaan adeeg khdanielsh lamarjorie . So [facility] [phone].    ATENCIÓN: Si habla español, tiene a kolb disposición servicios gratuitos de asistencia lingüística. Llame al [phone].    We comply with applicable federal civil rights laws and [place] laws. We do not discriminate on the basis of race, color, national origin, age, disability, sex, sexual orientation, or gender identity.            Thank you!     Thank you for choosing [facility]  for your care. Our goal is always to provide you with excellent care. Hearing back from our patients is one way we can continue to improve our services. Please take a few minutes to complete the written survey that you may receive in the mail after your visit with us. Thank you!             Your Updated Medication List - Protect others around you: Learn how to safely use, store and throw away your medicines at www.disposemymeds.org.          This list is accurate as of 9/4/18  1:54 PM.  Always use your most recent med list.                   Brand Name Dispense Instructions for use Diagnosis    finasteride 5 MG tablet    PROSCAR    90 tablet    Take 1 tablet (5 mg) by mouth daily    Benign prostatic hyperplasia with urinary obstruction       fluocinolone acetonide 0.01 % Oil     20 mL    Use 4-5 drops in right ear 1-2 times daily as needed for itching.    Chronic eczematous otitis externa of right ear       MULTI-VITAMIN PO      1 tablet DAILY        polyethylene glycol 0.4%- propylene glycol 0.3% 0.4-0.3 % Soln ophthalmic  solution    SYSTANE ULTRA     Place 1 drop into both eyes 2 times daily    Dry eyes, bilateral       sulindac 200 MG tablet    CLINORIL    60 tablet    Take 1 tablet (200 mg) by mouth 2 times daily    Primary osteoarthritis of left hip       tamsulosin 0.4 MG capsule    FLOMAX    180 capsule    TAKE 2 CAPSULES DAILY    Benign prostatic hyperplasia with urinary obstruction       VITAMIN C PO      2 TABLETS DAILY

## 2018-09-14 ENCOUNTER — OFFICE VISIT (OUTPATIENT)
Dept: OTOLARYNGOLOGY | Facility: CLINIC | Age: 78
End: 2018-09-14
Payer: COMMERCIAL

## 2018-09-14 VITALS
SYSTOLIC BLOOD PRESSURE: 106 MMHG | RESPIRATION RATE: 18 BRPM | HEIGHT: 66 IN | DIASTOLIC BLOOD PRESSURE: 74 MMHG | BODY MASS INDEX: 24.59 KG/M2 | WEIGHT: 153 LBS

## 2018-09-14 DIAGNOSIS — H92.01 OTALGIA, RIGHT: Primary | ICD-10-CM

## 2018-09-14 DIAGNOSIS — R68.84 JAW PAIN: ICD-10-CM

## 2018-09-14 PROCEDURE — 99214 OFFICE O/P EST MOD 30 MIN: CPT | Performed by: OTOLARYNGOLOGY

## 2018-09-14 NOTE — PATIENT INSTRUCTIONS
General Scheduling Information  To schedule your CT/MRI scan, please contact [place] [facility] at [phone]   [address] [place] [address]    To schedule your Surgery, please contact our Specialty Schedulers at [phone]    ENT Clinic Locations Clinic Hours Telephone Number     [facility]  [address] [place] [address]   Tuesday:       8:00am -- 4:00pm    Wednesday:  8:00am - 4:00pm   To schedule an appointment with   Dr. Castano,   please contact our   Specialty Scheduling Department at:     [phone]       [facility]ra [facility]  [address] [place] [address]   Friday:          8:00am - 4:00pm         Urgent Care Locations Clinic Hours Telephone Numbers     Andra Kim  [address] [place] [address]     Monday-Friday:     11:00pm - 9:00pm    Saturday-Sunday:  9:00am - 5:00pm   [phone]     Andra Thomas  [address] [place] [address]     Monday-Friday:      5:00pm - 9:00pm     Saturday-Sunday:  9:00am - 5:00pm   [phone]

## 2018-09-14 NOTE — MR AVS SNAPSHOT
After Visit Summary   9/14/2018    Froy Loredo    MRN: [id number]           Patient Information     Date Of Birth          1940        Visit Information        Provider Department      9/14/2018 11:15 AM Gera Castano MD [facility]        Today's Diagnoses     Otalgia, right    -  1    Jaw pain          Care Instructions    General Scheduling Information  To schedule your CT/MRI scan, please contact [place] [facility] at [phone] [address] [facility]  [place] [address]    To schedule your Surgery, please contact our Specialty Schedulers at [phone]    ENT Clinic Locations Clinic Hours Telephone Number     [facility]  [address]  [place] [address]   Tuesday:       8:00am -- 4:00pm    Wednesday:  8:00am - 4:00pm   To schedule an appointment with   Dr. Castano,   please contact our   Specialty Scheduling Department at:     [phone]       [facility]  [address] [facility] [place] [address]   Friday:          8:00am - 4:00pm         Urgent Care Locations Clinic Hours Telephone Numbers     [facility]  [address] [place] [place] [address]     Monday-Friday:     11:00pm - 9:00pm    Saturday-Sunday:  9:00am - 5:00pm   [phone]     [facility]  [address]   [place] [address]     Monday-Friday:      5:00pm - 9:00pm     Saturday-Sunday:  9:00am - 5:00pm   [phone]                   Follow-ups after your visit        Your next 10 appointments already scheduled     Sep 17, 2018 12:00 PM CDT   CT SOFT TISSUE NECK W CONTRAST with BECT1   [facility] [place] ([facility] [place])    [address] [facility]  [place] [address] [phone]           How do I prepare for my exam? (Food and drink instructions) **You will have contrast for this exam.** Do not eat or drink for 2 hours before your exam. If you need to take medicine, you may take it with small sips of water. (We may ask you to take liquid  medicine as well.)  The day before your exam, drink extra fluids at least six 8-ounce glasses (unless your doctor tells you to restrict your fluids).  How do I prepare for my exam? (Other instructions) Patients over 70 or patients with diabetes or kidney problems: If you haven t had a blood test (creatinine test) within the last 30 days, the Cardiologist/Radiologist may require you to get this test prior to your exam.  What should I wear: Please wear loose clothing, such as a sweat suit or jogging clothes.  Avoid snaps, zippers and other metal. We may ask you to undress and put on a hospital gown.  How long does the exam take: Most scans take less than 20 minutes.  What should I bring: Please bring any scans or X-rays taken at other hospitals, if similar tests were done. Also bring a list of your medicines, including vitamins, minerals and over-the-counter drugs. It is safest to leave personal items at home.  Do I need a :  No  is needed.  What do I need to tell my doctor? Be sure to tell your doctor: * If you have any allergies. * If there s any chance you are pregnant. * If you are breastfeeding. * If you have diabetes as your medication may need to be adjusted for this exam.  What should I do after the exam: No restrictions, You may resume normal activities.  What is this test: A CT (computed tomography) scan is a series of pictures that allows us to look inside your body. The scanner creates images of the body in cross sections, much like slices of bread. This helps us see any problems more clearly. You may receive contrast (X-ray dye) before or during your scan. Contrast is given through an IV (small needle in your arm).  Who should I call with questions: If you have any questions, please call the Imaging Department where you will have your exam. Directions, parking instructions, and other information is available on our website, [email or web address].              Future tests that were ordered for  "you today     Open Future Orders        Priority Expected Expires Ordered    CT Soft Tissue Neck w Contrast Routine  9/14/2019 9/14/2018            Who to contact     If you have questions or need follow up information about today's clinic visit or your schedule please contact [facility] directly at [phone].  Normal or non-critical lab and imaging results will be communicated to you by MyChart, letter or phone within 4 business days after the clinic has received the results. If you do not hear from us within 7 days, please contact the clinic through [employer] or phone. If you have a critical or abnormal lab result, we will notify you by phone as soon as possible.  Submit refill requests through [employer] or call your pharmacy and they will forward the refill request to us. Please allow 3 business days for your refill to be completed.          Additional Information About Your Visit        MyChart Information     [employer] gives you secure access to your electronic health record. If you see a primary care provider, you can also send messages to your care team and make appointments. If you have questions, please call your primary care clinic.  If you do not have a primary care provider, please call [phone] and they will assist you.        Care EveryWhere ID     This is your Care EveryWhere ID. This could be used by other organizations to access your [facility] medical records  [id number]        Your Vitals Were     Respirations Height BMI (Body Mass Index)             18 1.683 m (5' 6.25\") 24.51 kg/m2          Blood Pressure from Last 3 Encounters:   09/14/18 106/74   09/04/18 156/77   08/24/18 124/78    Weight from Last 3 Encounters:   09/14/18 69.4 kg (153 lb)   09/04/18 69.4 kg (153 lb)   08/24/18 69.8 kg (153 lb 12.8 oz)               Primary Care Provider Office Phone # Fax #    Bj Hull [phone] [address] [facility] [address]        Equal Access to " Services     [facility]: Hadii aad ku hadиванshirlene Isaeblleelaine, waaxda luqadaha, qaybta kaalmada cruzsanjayjudi, emmanuel wilson . So [facility] [phone].    ATENCIÓN: Si julietala александр, tiene a kolb disposición servicios gratuitos de asistencia lingüística. Llame al [phone].    We comply with applicable federal civil rights laws and [place] laws. We do not discriminate on the basis of race, color, national origin, age, disability, sex, sexual orientation, or gender identity.            Thank you!     Thank you for choosing [facility]  for your care. Our goal is always to provide you with excellent care. Hearing back from our patients is one way we can continue to improve our services. Please take a few minutes to complete the written survey that you may receive in the mail after your visit with us. Thank you!             Your Updated Medication List - Protect others around you: Learn how to safely use, store and throw away your medicines at www.disposemymeds.org.          This list is accurate as of 9/14/18  4:20 PM.  Always use your most recent med list.                   Brand Name Dispense Instructions for use Diagnosis    finasteride 5 MG tablet    PROSCAR    90 tablet    Take 1 tablet (5 mg) by mouth daily    Benign prostatic hyperplasia with urinary obstruction       fluocinolone acetonide 0.01 % Oil     20 mL    Use 4-5 drops in right ear 1-2 times daily as needed for itching.    Chronic eczematous otitis externa of right ear       MULTI-VITAMIN PO      1 tablet DAILY        polyethylene glycol 0.4%- propylene glycol 0.3% 0.4-0.3 % Soln ophthalmic solution    SYSTANE ULTRA     Place 1 drop into both eyes 2 times daily    Dry eyes, bilateral       sulindac 200 MG tablet    CLINORIL    60 tablet    Take 1 tablet (200 mg) by mouth 2 times daily    Primary osteoarthritis of left hip       tamsulosin 0.4 MG capsule    FLOMAX    180 capsule    TAKE 2 CAPSULES DAILY    Benign  prostatic hyperplasia with urinary obstruction       VITAMIN C PO      2 TABLETS DAILY

## 2018-09-14 NOTE — PROGRESS NOTES
Chief Complaint - recheck ears, chronic eczema; new ear pain    History of Present Illness - Froy Loredo is a 77 year old male who returns to me today for right ear concerns. No drainage. Left ear feels normal. No hearing concerns. They have noted symptoms for approximately 1 year.  Tried hydrocortisone cream, but didn't work. The patient denies otorrhea. However, no pain hurts more, but it is worse with chewing and biting. Feels teeth are off some with bite. No history of ear surgery or chronic ear disease. No ear problems in family. No dysphagia.     Past Medical History -   Patient Active Problem List   Diagnosis     Benign prostatic hyperplasia with urinary obstruction     CARDIOVASCULAR SCREENING; LDL GOAL LESS THAN 160     Advanced directives, counseling/discussion     History of skin cancer     AK (actinic keratosis)     ED (erectile dysfunction)     Health Care Home     Lymphopenia     Carcinoma in situ     Right arm cellulitis     Microscopic hematuria     Gastroesophageal reflux disease without esophagitis     PSC (posterior subcapsular cataract), left       Current Medications -   Current Outpatient Prescriptions:      finasteride (PROSCAR) 5 MG tablet, Take 1 tablet (5 mg) by mouth daily, Disp: 90 tablet, Rfl: 3     fluocinolone acetonide 0.01 % OIL, Use 4-5 drops in right ear 1-2 times daily as needed for itching., Disp: 20 mL, Rfl: 1     MULTI-VITAMIN OR,  1 tablet DAILY, Disp: , Rfl:      polyethylene glycol 0.4%- propylene glycol 0.3% (SYSTANE ULTRA) 0.4-0.3 % SOLN ophthalmic solution, Place 1 drop into both eyes 2 times daily, Disp: , Rfl:      sulindac (CLINORIL) 200 MG tablet, Take 1 tablet (200 mg) by mouth 2 times daily, Disp: 60 tablet, Rfl: 11     tamsulosin (FLOMAX) 0.4 MG capsule, TAKE 2 CAPSULES DAILY, Disp: 180 capsule, Rfl: 0     VITAMIN C OR, 2 TABLETS DAILY, Disp: , Rfl:     Allergies -   Allergies   Allergen Reactions     Doxazosin      Lightheadedness 2013, resolved with switch to  tamsulosin        Social History -   Social History     Social History     Marital status:      Spouse name: Radha     Number of children: 2     Years of education: N/A     Occupational History     Real estate appraisal Retired     [place] [employer]      Retired     Social History Main Topics     Smoking status: Never Smoker     Smokeless tobacco: Never Used      Comment: Lives in smoke free household     Alcohol use No     Drug use: No     Sexual activity: Yes     Partners: Female     Birth control/ protection: None     Other Topics Concern     Parent/Sibling W/ Cabg, Mi Or Angioplasty Before 65f 55m? No      Service Yes     Blood Transfusions No     Caffeine Concern No     Occupational Exposure No     Hobby Hazards No     Sleep Concern Yes     occazanaly     Stress Concern No     Weight Concern No     Special Diet No     Back Care No     Exercise Yes     Bike Helmet No     Seat Belt Yes     Self-Exams No     Social History Narrative       Family History -   Family History   Problem Relation Age of Onset     Diabetes Maternal Grandfather      90's     Other Cancer Other      C.A.D. No family hx of      Cancer No family hx of      Hypertension No family hx of      Cerebrovascular Disease No family hx of      Thyroid Disease No family hx of      Glaucoma No family hx of      Macular Degeneration No family hx of      Breast Cancer No family hx of      Prostate Cancer No family hx of      Depression No family hx of      Anxiety Disorder No family hx of      Mental Illness No family hx of      Substance Abuse No family hx of      Anesthesia Reaction No family hx of      Asthma No family hx of      Osteoporosis No family hx of      Genetic Disorder No family hx of      Obesity No family hx of      Unknown/Adopted No family hx of        Review of Systems - As per HPI and PMHx, otherwise 7 system review of the head and neck negative.    Physical Exam  /74 (Cuff Size: Adult Regular)  Resp 18  Ht 1.683 m (5'  "6.25\")  Wt 69.4 kg (153 lb)  BMI 24.51 kg/m2  General - The patient is in no distress.  Alert and oriented to person and place, answers questions and cooperates with examination appropriately.   Voice and Breathing - The patient was breathing comfortably without the use of accessory muscles. There was no wheezing, stridor, or stertor.  The patients voice was clear and strong.  Ears - both canals clean and clear. No significant erythema or flaky skin.   Eyes - Extraocular movements intact. Sclera were not icteric or injected.  Neck - + right TMJ tenderness. I can see the TMJ move posteriorly when he opens jaw. It moves into ear canal. Palpation of the occipital, submental, submandibular, internal jugular chain, and supraclavicular nodes did not demonstrate any abnormal lymph nodes or masses. Parotid glands were without masses.  The trachea was mobile and midline.  Mouth - occlusion seems intact. No intraoral lesions  Neurological - Cranial nerves 2 through 12 were grossly intact. House-Brackmann grade 1 out of 6 bilaterally.       Assessment and Plan - Froy Loredo is a 77 year old male who returns with right jaw pain. TMJ moves posteriorly into canal with opening. I don't think there is anything more worrisome, but given there is malocclusion symptoms I recommend a CT. We then discussed massage, hot packs, NSAIDS, soft diet. i'll call with CT results.     Gera Castano MD  Otolaryngology  [facility]    "

## 2018-09-14 NOTE — LETTER
9/14/2018         RE: Froy Loredo  [address] [facility] [address]        Dear Colleague,    Thank you for referring your patient, Froy Loredo, to the [facility]. Please see a copy of my visit note below.    Chief Complaint - recheck ears, chronic eczema; new ear pain    History of Present Illness - Froy Loredo is a 77 year old male who returns to me today for right ear concerns. No drainage. Left ear feels normal. No hearing concerns. They have noted symptoms for approximately 1 year.  Tried hydrocortisone cream, but didn't work. The patient denies otorrhea. However, no pain hurts more, but it is worse with chewing and biting. Feels teeth are off some with bite. No history of ear surgery or chronic ear disease. No ear problems in family. No dysphagia.     Past Medical History -   Patient Active Problem List   Diagnosis     Benign prostatic hyperplasia with urinary obstruction     CARDIOVASCULAR SCREENING; LDL GOAL LESS THAN 160     Advanced directives, counseling/discussion     History of skin cancer     AK (actinic keratosis)     ED (erectile dysfunction)     Health Care Home     Lymphopenia     Carcinoma in situ     Right arm cellulitis     Microscopic hematuria     Gastroesophageal reflux disease without esophagitis     PSC (posterior subcapsular cataract), left       Current Medications -   Current Outpatient Prescriptions:      finasteride (PROSCAR) 5 MG tablet, Take 1 tablet (5 mg) by mouth daily, Disp: 90 tablet, Rfl: 3     fluocinolone acetonide 0.01 % OIL, Use 4-5 drops in right ear 1-2 times daily as needed for itching., Disp: 20 mL, Rfl: 1     MULTI-VITAMIN OR,  1 tablet DAILY, Disp: , Rfl:      polyethylene glycol 0.4%- propylene glycol 0.3% (SYSTANE ULTRA) 0.4-0.3 % SOLN ophthalmic solution, Place 1 drop into both eyes 2 times daily, Disp: , Rfl:      sulindac (CLINORIL) 200 MG tablet, Take 1 tablet (200 mg) by mouth 2 times daily, Disp: 60 tablet, Rfl: 11      tamsulosin (FLOMAX) 0.4 MG capsule, TAKE 2 CAPSULES DAILY, Disp: 180 capsule, Rfl: 0     VITAMIN C OR, 2 TABLETS DAILY, Disp: , Rfl:     Allergies -   Allergies   Allergen Reactions     Doxazosin      Lightheadedness 2013, resolved with switch to tamsulosin        Social History -   Social History     Social History     Marital status:      Spouse name: Radha     Number of children: 2     Years of education: N/A     Occupational History     Real estate appraisal Retired     [place] [employer]      Retired     Social History Main Topics     Smoking status: Never Smoker     Smokeless tobacco: Never Used      Comment: Lives in smoke free household     Alcohol use No     Drug use: No     Sexual activity: Yes     Partners: Female     Birth control/ protection: None     Other Topics Concern     Parent/Sibling W/ Cabg, Mi Or Angioplasty Before 65f 55m? No      Service Yes     Blood Transfusions No     Caffeine Concern No     Occupational Exposure No     Hobby Hazards No     Sleep Concern Yes     occazanaly     Stress Concern No     Weight Concern No     Special Diet No     Back Care No     Exercise Yes     Bike Helmet No     Seat Belt Yes     Self-Exams No     Social History Narrative       Family History -   Family History   Problem Relation Age of Onset     Diabetes Maternal Grandfather      90's     Other Cancer Other      C.A.D. No family hx of      Cancer No family hx of      Hypertension No family hx of      Cerebrovascular Disease No family hx of      Thyroid Disease No family hx of      Glaucoma No family hx of      Macular Degeneration No family hx of      Breast Cancer No family hx of      Prostate Cancer No family hx of      Depression No family hx of      Anxiety Disorder No family hx of      Mental Illness No family hx of      Substance Abuse No family hx of      Anesthesia Reaction No family hx of      Asthma No family hx of      Osteoporosis No family hx of      Genetic Disorder No family hx of       "Obesity No family hx of      Unknown/Adopted No family hx of        Review of Systems - As per HPI and PMHx, otherwise 7 system review of the head and neck negative.    Physical Exam  /74 (Cuff Size: Adult Regular)  Resp 18  Ht 1.683 m (5' 6.25\")  Wt 69.4 kg (153 lb)  BMI 24.51 kg/m2  General - The patient is in no distress.  Alert and oriented to person and place, answers questions and cooperates with examination appropriately.   Voice and Breathing - The patient was breathing comfortably without the use of accessory muscles. There was no wheezing, stridor, or stertor.  The patients voice was clear and strong.  Ears - both canals clean and clear. No significant erythema or flaky skin.   Eyes - Extraocular movements intact. Sclera were not icteric or injected.  Neck - + right TMJ tenderness. I can see the TMJ move posteriorly when he opens jaw. It moves into ear canal. Palpation of the occipital, submental, submandibular, internal jugular chain, and supraclavicular nodes did not demonstrate any abnormal lymph nodes or masses. Parotid glands were without masses.  The trachea was mobile and midline.  Mouth - occlusion seems intact. No intraoral lesions  Neurological - Cranial nerves 2 through 12 were grossly intact. House-Brackmann grade 1 out of 6 bilaterally.       Assessment and Plan - Froy Loredo is a 77 year old male who returns with right jaw pain. TMJ moves posteriorly into canal with opening. I don't think there is anything more worrisome, but given there is malocclusion symptoms I recommend a CT. We then discussed massage, hot packs, NSAIDS, soft diet. i'll call with CT results.     Gera Castano MD  Otolaryngology  [facility]      Again, thank you for allowing me to participate in the care of your patient.        Sincerely,        Gera Castano MD    "

## 2018-09-17 ENCOUNTER — TELEPHONE (OUTPATIENT)
Dept: OTOLARYNGOLOGY | Facility: CLINIC | Age: 78
End: 2018-09-17

## 2018-09-17 ENCOUNTER — RADIANT APPOINTMENT (OUTPATIENT)
Dept: CT IMAGING | Facility: CLINIC | Age: 78
End: 2018-09-17
Attending: OTOLARYNGOLOGY
Payer: COMMERCIAL

## 2018-09-17 DIAGNOSIS — H92.01 OTALGIA, RIGHT: ICD-10-CM

## 2018-09-17 DIAGNOSIS — R68.84 JAW PAIN: ICD-10-CM

## 2018-09-17 LAB
CREAT BLD-MCNC: 1.1 MG/DL (ref 0.5–1.2)
GFR SERPL CREATININE-BSD FRML MDRD: 64 ML/MIN/{1.73_M2}
GFRB: 65

## 2018-09-17 PROCEDURE — 70491 CT SOFT TISSUE NECK W/DYE: CPT | Mod: TC

## 2018-09-17 PROCEDURE — 82565 ASSAY OF CREATININE: CPT

## 2018-09-17 RX ORDER — IOPAMIDOL 755 MG/ML
80 INJECTION, SOLUTION INTRAVASCULAR ONCE
Status: COMPLETED | OUTPATIENT
Start: 2018-09-17 | End: 2018-09-17

## 2018-09-17 RX ADMIN — IOPAMIDOL 80 ML: 755 INJECTION, SOLUTION INTRAVASCULAR at 12:12

## 2018-10-05 DIAGNOSIS — N13.8 BENIGN PROSTATIC HYPERPLASIA WITH URINARY OBSTRUCTION: ICD-10-CM

## 2018-10-05 DIAGNOSIS — N40.1 BENIGN PROSTATIC HYPERPLASIA WITH URINARY OBSTRUCTION: ICD-10-CM

## 2018-10-05 RX ORDER — TAMSULOSIN HYDROCHLORIDE 0.4 MG/1
CAPSULE ORAL
Qty: 180 CAPSULE | Refills: 2 | Status: SHIPPED | OUTPATIENT
Start: 2018-10-05 | End: 2019-02-25

## 2018-10-05 RX ORDER — FINASTERIDE 5 MG/1
TABLET, FILM COATED ORAL
Qty: 90 TABLET | Refills: 2 | Status: SHIPPED | OUTPATIENT
Start: 2018-10-05 | End: 2019-02-25

## 2018-11-14 ENCOUNTER — OFFICE VISIT (OUTPATIENT)
Dept: OPTOMETRY | Facility: CLINIC | Age: 78
End: 2018-11-14
Payer: COMMERCIAL

## 2018-11-14 DIAGNOSIS — H25.13 NUCLEAR SCLEROTIC CATARACT OF BOTH EYES: ICD-10-CM

## 2018-11-14 DIAGNOSIS — H52.03 HYPEROPIA, BILATERAL: ICD-10-CM

## 2018-11-14 DIAGNOSIS — Z01.01 ENCOUNTER FOR EXAMINATION OF EYES AND VISION WITH ABNORMAL FINDINGS: Primary | ICD-10-CM

## 2018-11-14 DIAGNOSIS — H52.4 PRESBYOPIA: ICD-10-CM

## 2018-11-14 DIAGNOSIS — H04.123 DRY EYES: ICD-10-CM

## 2018-11-14 DIAGNOSIS — H52.223 REGULAR ASTIGMATISM OF BOTH EYES: ICD-10-CM

## 2018-11-14 PROCEDURE — 92015 DETERMINE REFRACTIVE STATE: CPT | Performed by: OPTOMETRIST

## 2018-11-14 PROCEDURE — 92014 COMPRE OPH EXAM EST PT 1/>: CPT | Performed by: OPTOMETRIST

## 2018-11-14 ASSESSMENT — REFRACTION_MANIFEST
OS_AXIS: 006
OD_SPHERE: +2.25
OS_AXIS: 005
OS_SPHERE: +1.25
OD_SPHERE: +2.25
OD_AXIS: 180
METHOD_AUTOREFRACTION: 1
OD_ADD: +2.50
OS_CYLINDER: +2.25
OD_CYLINDER: +1.75
OD_CYLINDER: +2.00
OS_CYLINDER: +1.50
OS_ADD: +2.50
OD_AXIS: 006
OS_SPHERE: +1.50

## 2018-11-14 ASSESSMENT — REFRACTION_WEARINGRX
OS_ADD: +2.50
OD_CYLINDER: +1.50
SPECS_TYPE: PAL
OS_AXIS: 015
OS_ADD: +2.35
OD_CYLINDER: +1.75
OD_AXIS: 170
OS_SPHERE: +1.50
OD_AXIS: 172
OS_CYLINDER: +0.50
OD_ADD: +2.35
SPECS_TYPE: PAL
OS_AXIS: 020
OS_CYLINDER: +1.00
OD_SPHERE: +2.25
OD_SPHERE: +1.75
OS_SPHERE: +1.75
OD_ADD: +2.50

## 2018-11-14 ASSESSMENT — TONOMETRY
OS_IOP_MMHG: 20
OD_IOP_MMHG: 18
IOP_METHOD: APPLANATION

## 2018-11-14 ASSESSMENT — KERATOMETRY
OD_K1POWER_DIOPTERS: 42.25
OS_K1POWER_DIOPTERS: 42.50
OD_AXISANGLE2_DEGREES: 17
OD_K2POWER_DIOPTERS: 41.50
OS_K2POWER_DIOPTERS: 41.75
OS_AXISANGLE2_DEGREES: 176

## 2018-11-14 ASSESSMENT — CUP TO DISC RATIO
OD_RATIO: 0.2
OS_RATIO: 0.2

## 2018-11-14 ASSESSMENT — SLIT LAMP EXAM - LIDS
COMMENTS: NORMAL
COMMENTS: NORMAL

## 2018-11-14 ASSESSMENT — VISUAL ACUITY
METHOD: SNELLEN - LINEAR
OS_CC: 20/25
CORRECTION_TYPE: GLASSES
OD_CC: 20/25
OS_CC: 20/30
OD_CC+: -1
OD_CC: 20/25
OS_CC+: -1

## 2018-11-14 ASSESSMENT — CONF VISUAL FIELD
OD_NORMAL: 1
METHOD: COUNTING FINGERS
OS_NORMAL: 1

## 2018-11-14 ASSESSMENT — EXTERNAL EXAM - LEFT EYE: OS_EXAM: NORMAL

## 2018-11-14 ASSESSMENT — EXTERNAL EXAM - RIGHT EYE: OD_EXAM: NORMAL

## 2018-11-14 NOTE — MR AVS SNAPSHOT
After Visit Summary   11/14/2018    Froy Loredo    MRN: [id number]           Patient Information     Date Of Birth          1940        Visit Information        Provider Department      11/14/2018 10:40 AM Leann Jones, JENNIFER [facility] [place] [facility]        Today's Diagnoses     Encounter for examination of eyes and vision with abnormal findings    -  1    Hyperopia, bilateral        Regular astigmatism of both eyes        Presbyopia          Care Instructions    Patient was advised of today's exam findings.  Fill glasses prescription  Allow 2 weeks to adapt to change in glasses  Monitor mild cataracts yearly   Use over the counter artificial tears 1- 2 times a day (Thera Tears or Thera Tears Extra , Systane Ultra or Refresh Optive)  Return in 1 year for eye exam    Leann Jones O.D.  [facility]   [address] [place], [place] [id number] [phone]            Follow-ups after your visit        Who to contact     If you have questions or need follow up information about today's clinic visit or your schedule please contact [facility] directly at [phone].  Normal or non-critical lab and imaging results will be communicated to you by [employer], letter or phone within 4 business days after the clinic has received the results. If you do not hear from us within 7 days, please contact the clinic through [employer] or phone. If you have a critical or abnormal lab result, we will notify you by phone as soon as possible.  Submit refill requests through [employer] or call your pharmacy and they will forward the refill request to us. Please allow 3 business days for your refill to be completed.          Additional Information About Your Visit        MyChart Information     [employer] gives you secure access to your electronic health record. If you see a primary care provider, you can also send messages to your care team and make appointments. If you have questions, please call  your primary care clinic.  If you do not have a primary care provider, please call [phone] and they will assist you.        Care EveryWhere ID     This is your Care EveryWhere ID. This could be used by other organizations to access your [facility] medical records  [id number]         Blood Pressure from Last 3 Encounters:   09/14/18 106/74   09/04/18 156/77   08/24/18 124/78    Weight from Last 3 Encounters:   09/14/18 69.4 kg (153 lb)   09/04/18 69.4 kg (153 lb)   08/24/18 69.8 kg (153 lb 12.8 oz)              Today, you had the following     No orders found for display       Primary Care Provider Office Phone # Fax #    Bj Hull [phone] [address] [facility] [address]        Equal Access to Services     [facility]: Hadii sonia mensah hadasho Soomaali, waaxda luqadaha, qaybta kaalmada adeegyada, emmanuel hinojosa hayayse wilson . So [facility] [phone].    ATENCIÓN: Si habla español, tiene a kolb disposición servicios gratuitos de asistencia lingüística. Llame al [phone].    We comply with applicable federal civil rights laws and [place] laws. We do not discriminate on the basis of race, color, national origin, age, disability, sex, sexual orientation, or gender identity.            Thank you!     Thank you for choosing [facility]  for your care. Our goal is always to provide you with excellent care. Hearing back from our patients is one way we can continue to improve our services. Please take a few minutes to complete the written survey that you may receive in the mail after your visit with us. Thank you!             Your Updated Medication List - Protect others around you: Learn how to safely use, store and throw away your medicines at www.disposemymeds.org.          This list is accurate as of 11/14/18 12:32 PM.  Always use your most recent med list.                   Brand Name Dispense Instructions for use Diagnosis    finasteride 5 MG tablet     PROSCAR    90 tablet    TAKE 1 TABLET DAILY    Benign prostatic hyperplasia with urinary obstruction       fluocinolone acetonide 0.01 % Oil     20 mL    Use 4-5 drops in right ear 1-2 times daily as needed for itching.    Chronic eczematous otitis externa of right ear       MULTI-VITAMIN PO      1 tablet DAILY        polyethylene glycol 0.4%- propylene glycol 0.3% 0.4-0.3 % Soln ophthalmic solution    SYSTANE ULTRA     Place 1 drop into both eyes 2 times daily    Dry eyes, bilateral       sulindac 200 MG tablet    CLINORIL    60 tablet    Take 1 tablet (200 mg) by mouth 2 times daily    Primary osteoarthritis of left hip       tamsulosin 0.4 MG capsule    FLOMAX    180 capsule    TAKE 2 CAPSULES DAILY    Benign prostatic hyperplasia with urinary obstruction       VITAMIN C PO      2 TABLETS DAILY

## 2018-11-14 NOTE — PATIENT INSTRUCTIONS
Patient was advised of today's exam findings.  Fill glasses prescription  Allow 2 weeks to adapt to change in glasses  Monitor mild cataracts yearly   Use over the counter artificial tears 1- 2 times a day (Thera Tears or Thera Tears Extra , Systane Ultra or Refresh Optive)  Return in 1 year for eye exam    Leann Jones O.D.  [facility]   [address] [place], [place] [id number] [phone]

## 2018-11-14 NOTE — PROGRESS NOTES
"Chief Complaint   Patient presents with     COMPREHENSIVE EYE EXAM         Last Eye Exam: 5/10/2016  Dilated Previously: Yes    What are you currently using to see?  Glasses, wears glasses all of the time        Distance Vision Acuity: Satisfied with vision, no changes noted. Glasses working ok     Near Vision Acuity: Not satisfied, not as clear when he reads     Eye Comfort: Some days eyes are a little uncomfortable and he mentioned that he feels \"light headed\" quite a bit- for last year- no pattern to occurrence     Do you use eye drops? : Yes: Uses Systane daily - eyes still dry      Occupation or Hobbies: Retired     [employer] Assistant           Medical, surgical and family histories reviewed and updated 11/14/2018.       OBJECTIVE: See Ophthalmology exam    ASSESSMENT:    ICD-10-CM    1. Encounter for examination of eyes and vision with abnormal findings Z01.01    2. Hyperopia, bilateral H52.03    3. Regular astigmatism of both eyes H52.223    4. Presbyopia H52.4       PLAN:   Advised to discussed light headed feeling with  primary care provider   Patient Instructions   Patient was advised of today's exam findings.  Fill glasses prescription  Allow 2 weeks to adapt to change in glasses  Monitor mild cataracts yearly   Use over the counter artificial tears 1- 2 times a day (Thera Tears or Thera Tears Extra , Systane Ultra or Refresh Optive)  Return in 1 year for eye exam    Leann Jones O.D.  [facility]   [address] [place] [address]  [phone]       "

## 2018-11-14 NOTE — LETTER
"    11/14/2018         RE: Froy Loredo  [address] [facility] [address]        Dear Colleague,    Thank you for referring your patient, Froy Loredo, to the [facility]. Please see a copy of my visit note below.    Chief Complaint   Patient presents with     COMPREHENSIVE EYE EXAM         Last Eye Exam: 5/10/2016  Dilated Previously: Yes    What are you currently using to see?  Glasses, wears glasses all of the time        Distance Vision Acuity: Satisfied with vision, no changes noted. Glasses working ok     Near Vision Acuity: Not satisfied, not as clear when he reads     Eye Comfort: Some days eyes are a little uncomfortable and he mentioned that he feels \"light headed\" quite a bit- for last year- no pattern to occurrence     Do you use eye drops? : Yes: Uses Systane daily - eyes still dry      Occupation or Hobbies: Retired     [employer] Assistant           Medical, surgical and family histories reviewed and updated 11/14/2018.       OBJECTIVE: See Ophthalmology exam    ASSESSMENT:    ICD-10-CM    1. Encounter for examination of eyes and vision with abnormal findings Z01.01    2. Hyperopia, bilateral H52.03    3. Regular astigmatism of both eyes H52.223    4. Presbyopia H52.4       PLAN:   Advised to discussed light headed feeling with  primary care provider   Patient Instructions   Patient was advised of today's exam findings.  Fill glasses prescription  Allow 2 weeks to adapt to change in glasses  Monitor mild cataracts yearly   Use over the counter artificial tears 1- 2 times a day (Thera Tears or Thera Tears Extra , Systane Ultra or Refresh Optive)  Return in 1 year for eye exam    Leann Jones O.D.  [facility]   [address] [place], [place] [id number] [phone]           Again, thank you for allowing me to participate in the care of your patient.        Sincerely,        Leann Jones, OD    "

## 2019-01-21 ENCOUNTER — ANCILLARY PROCEDURE (OUTPATIENT)
Dept: GENERAL RADIOLOGY | Facility: CLINIC | Age: 79
End: 2019-01-21
Payer: COMMERCIAL

## 2019-01-21 ENCOUNTER — OFFICE VISIT (OUTPATIENT)
Dept: FAMILY MEDICINE | Facility: CLINIC | Age: 79
End: 2019-01-21
Payer: COMMERCIAL

## 2019-01-21 VITALS
HEART RATE: 101 BPM | OXYGEN SATURATION: 96 % | RESPIRATION RATE: 16 BRPM | SYSTOLIC BLOOD PRESSURE: 139 MMHG | HEIGHT: 67 IN | BODY MASS INDEX: 24.33 KG/M2 | DIASTOLIC BLOOD PRESSURE: 82 MMHG | TEMPERATURE: 97.4 F | WEIGHT: 155 LBS

## 2019-01-21 DIAGNOSIS — R07.81 RIB PAIN ON LEFT SIDE: Primary | ICD-10-CM

## 2019-01-21 DIAGNOSIS — R07.81 RIB PAIN ON LEFT SIDE: ICD-10-CM

## 2019-01-21 LAB
ALBUMIN SERPL-MCNC: 3.7 G/DL (ref 3.4–5)
ALP SERPL-CCNC: 89 U/L (ref 40–150)
ALT SERPL W P-5'-P-CCNC: 22 U/L (ref 0–70)
ANION GAP SERPL CALCULATED.3IONS-SCNC: 6 MMOL/L (ref 3–14)
AST SERPL W P-5'-P-CCNC: 20 U/L (ref 0–45)
BILIRUB SERPL-MCNC: 0.7 MG/DL (ref 0.2–1.3)
BUN SERPL-MCNC: 23 MG/DL (ref 7–30)
CALCIUM SERPL-MCNC: 8.9 MG/DL (ref 8.5–10.1)
CHLORIDE SERPL-SCNC: 108 MMOL/L (ref 94–109)
CO2 SERPL-SCNC: 27 MMOL/L (ref 20–32)
CREAT SERPL-MCNC: 1.18 MG/DL (ref 0.66–1.25)
ERYTHROCYTE [DISTWIDTH] IN BLOOD BY AUTOMATED COUNT: 13.8 % (ref 10–15)
GFR SERPL CREATININE-BSD FRML MDRD: 58 ML/MIN/{1.73_M2}
GLUCOSE SERPL-MCNC: 106 MG/DL (ref 70–99)
HCT VFR BLD AUTO: 51.4 % (ref 40–53)
HGB BLD-MCNC: 16.7 G/DL (ref 13.3–17.7)
MCH RBC QN AUTO: 30.8 PG (ref 26.5–33)
MCHC RBC AUTO-ENTMCNC: 32.5 G/DL (ref 31.5–36.5)
MCV RBC AUTO: 95 FL (ref 78–100)
PLATELET # BLD AUTO: 184 10E9/L (ref 150–450)
POTASSIUM SERPL-SCNC: 3.9 MMOL/L (ref 3.4–5.3)
PROT SERPL-MCNC: 7.1 G/DL (ref 6.8–8.8)
RBC # BLD AUTO: 5.42 10E12/L (ref 4.4–5.9)
SODIUM SERPL-SCNC: 141 MMOL/L (ref 133–144)
WBC # BLD AUTO: 8.9 10E9/L (ref 4–11)

## 2019-01-21 PROCEDURE — 99214 OFFICE O/P EST MOD 30 MIN: CPT | Performed by: NURSE PRACTITIONER

## 2019-01-21 PROCEDURE — 36415 COLL VENOUS BLD VENIPUNCTURE: CPT | Performed by: NURSE PRACTITIONER

## 2019-01-21 PROCEDURE — 80053 COMPREHEN METABOLIC PANEL: CPT | Performed by: NURSE PRACTITIONER

## 2019-01-21 PROCEDURE — 71101 X-RAY EXAM UNILAT RIBS/CHEST: CPT | Mod: LT

## 2019-01-21 PROCEDURE — 85027 COMPLETE CBC AUTOMATED: CPT | Performed by: NURSE PRACTITIONER

## 2019-01-21 ASSESSMENT — MIFFLIN-ST. JEOR: SCORE: 1371.71

## 2019-01-21 NOTE — PROGRESS NOTES
SUBJECTIVE:   Froy Loredo is a 78 year old male who presents to clinic today for the following health issues:      Lift side rib cage pain x 2-3 months intermittent       Duration: 2-3 months    Description (location/character/radiation): left side rib cage    Intensity:  mild    Accompanying signs and symptoms: none    History (similar episodes/previous evaluation): none    Precipitating or alleviating factors: None    Therapies tried and outcome: None    He reports no cough cold fever or injury or heavy lifting  No sob chest pain or restrictions with breathing  It is mid pain noticed more on cough sneezing, nontender to touch         Problem list and histories reviewed & adjusted, as indicated.  Additional history: as documented    Patient Active Problem List   Diagnosis     Benign prostatic hyperplasia with urinary obstruction     CARDIOVASCULAR SCREENING; LDL GOAL LESS THAN 160     Advanced directives, counseling/discussion     History of skin cancer     AK (actinic keratosis)     ED (erectile dysfunction)     Health Care Home     Lymphopenia     Carcinoma in situ     Right arm cellulitis     Microscopic hematuria     Gastroesophageal reflux disease without esophagitis     PSC (posterior subcapsular cataract), left     Nuclear sclerotic cataract of both eyes     Past Surgical History:   Procedure Laterality Date     BIOPSY       C NONSPECIFIC PROCEDURE  5/25/94    quadriceps tear-right     C NONSPECIFIC PROCEDURE      bcc excision left chest     COLONOSCOPY       HC MOHS ANY STAGE EA ADDTL BLOCK AFTER FIRST 5       ORTHOPEDIC SURGERY         Social History     Tobacco Use     Smoking status: Never Smoker     Smokeless tobacco: Never Used     Tobacco comment: Lives in smoke free household   Substance Use Topics     Alcohol use: No     Alcohol/week: 0.0 oz     Family History   Problem Relation Age of Onset     Diabetes Maternal Grandfather         90's     Other Cancer Other      C.A.D. No family hx of       "Cancer No family hx of      Hypertension No family hx of      Cerebrovascular Disease No family hx of      Thyroid Disease No family hx of      Glaucoma No family hx of      Macular Degeneration No family hx of      Breast Cancer No family hx of      Prostate Cancer No family hx of      Depression No family hx of      Anxiety Disorder No family hx of      Mental Illness No family hx of      Substance Abuse No family hx of      Anesthesia Reaction No family hx of      Asthma No family hx of      Osteoporosis No family hx of      Genetic Disorder No family hx of      Obesity No family hx of      Unknown/Adopted No family hx of            Reviewed and updated as needed this visit by clinical staff  Tobacco  Allergies  Meds       Reviewed and updated as needed this visit by Provider         ROS:  CONSTITUTIONAL: NEGATIVE for fever, chills, change in weight  INTEGUMENTARY/SKIN: NEGATIVE for worrisome rashes, moles or lesions  ENT/MOUTH: NEGATIVE for ear, mouth and throat problems  RESP: NEGATIVE for significant cough or SOB  CV: NEGATIVE for chest pain, palpitations or peripheral edema  MUSCULOSKELETAL: Left chest rib pain  NEURO: NEGATIVE for weakness, dizziness or paresthesias  HEME/ALLERGY/IMMUNE: NEGATIVE for bleeding problems  PSYCHIATRIC: NEGATIVE for changes in mood or affect    OBJECTIVE:     /82   Pulse 101   Temp 97.4  F (36.3  C) (Oral)   Resp 16   Ht 1.702 m (5' 7\")   Wt 70.3 kg (155 lb)   SpO2 96%   BMI 24.28 kg/m    Body mass index is 24.28 kg/m .  GENERAL: Alert and no distress  EYES: Eyes grossly normal to inspection, PERRL and conjunctivae and sclerae normal  HENT: ear canals and TM's normal, nose and mouth without ulcers or lesions  NECK: no adenopathy, no asymmetry, masses, or scars and thyroid normal to palpation  RESP: lungs clear to auscultation - no rales, rhonchi or wheezes  CV: regular rate and rhythm, normal S1 S2, no S3 or S4, no murmur, click or rub, no peripheral edema and " peripheral pulses strong  ABDOMEN: soft, nontender, no hepatosplenomegaly, no masses and bowel sounds normal  MS: no gross musculoskeletal defects noted, no edema  SKIN: no suspicious lesions or rashes  NEURO: Normal strength and tone, mentation intact and speech normal  PSYCH: mentation appears normal, affect normal/bright    Diagnostic Test Results:  Results for orders placed or performed in visit on 01/21/19 (from the past 24 hour(s))   CBC with platelets   Result Value Ref Range    WBC 8.9 4.0 - 11.0 10e9/L    RBC Count 5.42 4.4 - 5.9 10e12/L    Hemoglobin 16.7 13.3 - 17.7 g/dL    Hematocrit 51.4 40.0 - 53.0 %    MCV 95 78 - 100 fl    MCH 30.8 26.5 - 33.0 pg    MCHC 32.5 31.5 - 36.5 g/dL    RDW 13.8 10.0 - 15.0 %    Platelet Count 184 150 - 450 10e9/L     Xray - IMPRESSION: Heart size is normal. Pulmonary vasculature is normal.  Lungs are clear. No pleural fluid. No definite rib fractures  identified. There are overlapping shadows from abdominal structures in  the lower right ribs making evaluation somewhat limited. No  pneumothorax or pleural fluid.    ASSESSMENT/PLAN:       (R07.81) Rib pain on left side  (primary encounter diagnosis)    Plan:  Discussed lab and xray results  No pneumonia, pneumothroax or rib fractures  Monitor symptoms (they are mild) watchful wait if not improving or worsening call or rtc, could consider CT      [facility]

## 2019-02-25 DIAGNOSIS — N13.8 BENIGN PROSTATIC HYPERPLASIA WITH URINARY OBSTRUCTION: ICD-10-CM

## 2019-02-25 DIAGNOSIS — N40.1 BENIGN PROSTATIC HYPERPLASIA WITH URINARY OBSTRUCTION: ICD-10-CM

## 2019-02-26 RX ORDER — TAMSULOSIN HYDROCHLORIDE 0.4 MG/1
CAPSULE ORAL
Qty: 180 CAPSULE | Refills: 1 | Status: SHIPPED | OUTPATIENT
Start: 2019-02-26 | End: 2019-09-20

## 2019-02-26 RX ORDER — FINASTERIDE 5 MG/1
1 TABLET, FILM COATED ORAL DAILY
Qty: 90 TABLET | Refills: 1 | Status: SHIPPED | OUTPATIENT
Start: 2019-02-26

## 2019-03-25 ENCOUNTER — OFFICE VISIT (OUTPATIENT)
Dept: ORTHOPEDICS | Facility: CLINIC | Age: 79
End: 2019-03-25
Payer: COMMERCIAL

## 2019-03-25 VITALS
HEART RATE: 101 BPM | WEIGHT: 155 LBS | RESPIRATION RATE: 13 BRPM | HEIGHT: 67 IN | DIASTOLIC BLOOD PRESSURE: 93 MMHG | BODY MASS INDEX: 24.33 KG/M2 | SYSTOLIC BLOOD PRESSURE: 163 MMHG | OXYGEN SATURATION: 96 %

## 2019-03-25 DIAGNOSIS — M54.31 RIGHT SIDED SCIATICA: ICD-10-CM

## 2019-03-25 PROCEDURE — 99213 OFFICE O/P EST LOW 20 MIN: CPT | Performed by: ORTHOPAEDIC SURGERY

## 2019-03-25 RX ORDER — SULINDAC 200 MG/1
200 TABLET ORAL 2 TIMES DAILY
Qty: 60 TABLET | Refills: 11 | Status: SHIPPED | OUTPATIENT
Start: 2019-03-25

## 2019-03-25 ASSESSMENT — MIFFLIN-ST. JEOR: SCORE: 1371.71

## 2019-03-25 NOTE — LETTER
3/25/2019         RE: Froy Loredo  [address] [facility] [address]        Dear Colleague,    Thank you for referring your patient, Froy Loredo, to the [facility]. Please see a copy of my visit note below.    Froy Loredo is a 78 year old male who is seen as self referral for right buttock and thigh pain.  He has pain primarily in the  Buttock and posterior and lateral thigh. He has had this off and on since 2011. He has no numbness or tingling in the legs. He has used sulindac in the past, and this helped. He now takes it sporadically.    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 No family hx of      Cancer No family hx of      Hypertension No family hx of      Cerebrovascular Disease No family hx of      Thyroid Disease No family hx of      Glaucoma No family hx of      Macular Degeneration No family hx of      Breast Cancer No family hx of      Prostate Cancer No family hx of      Depression No family hx of      Anxiety Disorder No family hx of      Mental Illness No family hx of      Substance Abuse No family hx of      Anesthesia Reaction No family hx of      Asthma No family hx of       Osteoporosis No family hx of      Genetic Disorder No family hx of      Obesity No family hx of      Unknown/Adopted No family hx of        Social History     Socioeconomic History     Marital status:      Spouse name: Radha     Number of children: 2     Years of education: Not on file     Highest education level: Not on file   Occupational History     Occupation: Real estate appraisal     Employer: RETIRED     Comment: [place] [employer]     Employer: RETIRED   Social Needs     Financial resource strain: Not on file     Food insecurity:     Worry: Not on file     Inability: Not on file     Transportation needs:     Medical: Not on file     Non-medical: Not on file   Tobacco Use     Smoking status: Never Smoker     Smokeless tobacco: Never Used     Tobacco comment: Lives in smoke free household   Substance and Sexual Activity     Alcohol use: No     Alcohol/week: 0.0 oz     Drug use: No     Sexual activity: Yes     Partners: Female     Birth control/protection: None   Lifestyle     Physical activity:     Days per week: Not on file     Minutes per session: Not on file     Stress: Not on file   Relationships     Social connections:     Talks on phone: Not on file     Gets together: Not on file     Attends Restorationist service: Not on file     Active member of club or organization: Not on file     Attends meetings of clubs or organizations: Not on file     Relationship status: Not on file     Intimate partner violence:     Fear of current or ex partner: Not on file     Emotionally abused: Not on file     Physically abused: Not on file     Forced sexual activity: Not on file   Other Topics Concern     Parent/sibling w/ CABG, MI or angioplasty before 65F 55M? No      Service Yes     Blood Transfusions No     Caffeine Concern No     Occupational Exposure No     Hobby Hazards No     Sleep Concern Yes     Comment: occazanaly     Stress Concern No     Weight Concern No     Special Diet No     Back Care No     Exercise Yes  "    Bike Helmet No     Seat Belt Yes     Self-Exams No   Social History Narrative     Not on file       Current Outpatient Medications   Medication Sig Dispense Refill     finasteride (PROSCAR) 5 MG tablet Take 1 tablet (5 mg) by mouth daily 90 tablet 1     fluocinolone acetonide 0.01 % OIL Use 4-5 drops in right ear 1-2 times daily as needed for itching. 20 mL 1     MULTI-VITAMIN OR  1 tablet DAILY       polyethylene glycol 0.4%- propylene glycol 0.3% (SYSTANE ULTRA) 0.4-0.3 % SOLN ophthalmic solution Place 1 drop into both eyes 2 times daily       sulindac (CLINORIL) 200 MG tablet Take 1 tablet (200 mg) by mouth 2 times daily 60 tablet 11     tamsulosin (FLOMAX) 0.4 MG capsule TAKE 2 CAPSULES DAILY 180 capsule 1     VITAMIN C OR 2 TABLETS DAILY         Allergies   Allergen Reactions     Doxazosin      Lightheadedness 2013, resolved with switch to tamsulosin        REVIEW OF SYSTEMS:  CONSTITUTIONAL:  NEGATIVE for fever, chills, change in weight, not feeling tired  SKIN:  NEGATIVE for worrisome rashes, no skin lumps, no skin ulcers and no non-healing wounds  EYES:  NEGATIVE for vision changes or irritation.  ENT/MOUTH:  NEGATIVE.  No hearing loss, no hoarseness, no difficulty swallowing.  RESP:  NEGATIVE. No cough or shortness of breath.  CV:  NEGATIVE for chest pain, palpitations or peripheral edema  GI:  NEGATIVE for nausea, abdominal pain, heartburn, or change in bowel habits  :  Negative. No dysuria, no hematuria  MUSCULOSKELETAL:  See HPI above  NEURO:  NEGATIVE . No headaches, no dizziness,  no numbness  ENDOCRINE:  NEGATIVE for temperature intolerance, skin/hair changes  HEME/ALLERGY/IMMUNE:  NEGATIVE for bleeding problems  PSYCHIATRIC:  NEGATIVE. no anxiety, no depression.     Exam:  Vitals: BP (!) 163/93   Pulse 101   Resp 13   Ht 1.702 m (5' 7\")   Wt 70.3 kg (155 lb)   SpO2 96%   BMI 24.28 kg/m     BMI= Body mass index is 24.28 kg/m .  Constitutional:  healthy, alert and no distress  Neuro: Alert " and Oriented x 3, Sensation grossly WNL.  Psych: Affect normal   Respiratory: Breathing not labored.  Cardiovascular: normal peripheral pulses  Lymph: no adenopathy  Skin: No rashes,worrisome lesions or skin problems  Hips show full range of motion without pain.  He has no tenderness at the sacroiliac joints.  He does have tenderness in the right sciatic notch.  Negative on the left.  He had straight leg raising positive at about 60 on the right. Straight leg raise was tight on the left at 80.  Sensation, motor and circulation are intact.    Assessment:  Right sciatica.  Plan: sulindac 200 mg oral twice daily.  Consider physical therapy for back care.  Consider MRI and epidural steroid injection.    Again, thank you for allowing me to participate in the care of your patient.        Sincerely,        Carlos Drake MD

## 2019-03-25 NOTE — PROGRESS NOTES
Froy Loredo is a 78 year old male who is seen as self referral for right buttock and thigh pain.  He has pain primarily in the  Buttock and posterior and lateral thigh. He has had this off and on since 2011. He has no numbness or tingling in the legs. He has used sulindac in the past, and this helped. He now takes it sporadically.    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 No family hx of      Cancer No family hx of      Hypertension No family hx of      Cerebrovascular Disease No family hx of      Thyroid Disease No family hx of      Glaucoma No family hx of      Macular Degeneration No family hx of      Breast Cancer No family hx of      Prostate Cancer No family hx of      Depression No family hx of      Anxiety Disorder No family hx of      Mental Illness No family hx of      Substance Abuse No family hx of      Anesthesia Reaction No family hx of      Asthma No family hx of      Osteoporosis No family hx of      Genetic Disorder No family hx of      Obesity No family hx of      Unknown/Adopted No family hx of        Social History     Socioeconomic History     Marital status:      Spouse name: Radha     Number of children: 2      Years of education: Not on file     Highest education level: Not on file   Occupational History     Occupation: Real estate appraisal     Employer: RETIRED     Comment: [place] [employer]     Employer: RETIRED   Social Needs     Financial resource strain: Not on file     Food insecurity:     Worry: Not on file     Inability: Not on file     Transportation needs:     Medical: Not on file     Non-medical: Not on file   Tobacco Use     Smoking status: Never Smoker     Smokeless tobacco: Never Used     Tobacco comment: Lives in smoke free household   Substance and Sexual Activity     Alcohol use: No     Alcohol/week: 0.0 oz     Drug use: No     Sexual activity: Yes     Partners: Female     Birth control/protection: None   Lifestyle     Physical activity:     Days per week: Not on file     Minutes per session: Not on file     Stress: Not on file   Relationships     Social connections:     Talks on phone: Not on file     Gets together: Not on file     Attends Uatsdin service: Not on file     Active member of club or organization: Not on file     Attends meetings of clubs or organizations: Not on file     Relationship status: Not on file     Intimate partner violence:     Fear of current or ex partner: Not on file     Emotionally abused: Not on file     Physically abused: Not on file     Forced sexual activity: Not on file   Other Topics Concern     Parent/sibling w/ CABG, MI or angioplasty before 65F 55M? No      Service Yes     Blood Transfusions No     Caffeine Concern No     Occupational Exposure No     Hobby Hazards No     Sleep Concern Yes     Comment: occazanaly     Stress Concern No     Weight Concern No     Special Diet No     Back Care No     Exercise Yes     Bike Helmet No     Seat Belt Yes     Self-Exams No   Social History Narrative     Not on file       Current Outpatient Medications   Medication Sig Dispense Refill     finasteride (PROSCAR) 5 MG tablet Take 1 tablet (5 mg) by mouth daily 90 tablet 1      "fluocinolone acetonide 0.01 % OIL Use 4-5 drops in right ear 1-2 times daily as needed for itching. 20 mL 1     MULTI-VITAMIN OR  1 tablet DAILY       polyethylene glycol 0.4%- propylene glycol 0.3% (SYSTANE ULTRA) 0.4-0.3 % SOLN ophthalmic solution Place 1 drop into both eyes 2 times daily       sulindac (CLINORIL) 200 MG tablet Take 1 tablet (200 mg) by mouth 2 times daily 60 tablet 11     tamsulosin (FLOMAX) 0.4 MG capsule TAKE 2 CAPSULES DAILY 180 capsule 1     VITAMIN C OR 2 TABLETS DAILY         Allergies   Allergen Reactions     Doxazosin      Lightheadedness 2013, resolved with switch to tamsulosin        REVIEW OF SYSTEMS:  CONSTITUTIONAL:  NEGATIVE for fever, chills, change in weight, not feeling tired  SKIN:  NEGATIVE for worrisome rashes, no skin lumps, no skin ulcers and no non-healing wounds  EYES:  NEGATIVE for vision changes or irritation.  ENT/MOUTH:  NEGATIVE.  No hearing loss, no hoarseness, no difficulty swallowing.  RESP:  NEGATIVE. No cough or shortness of breath.  CV:  NEGATIVE for chest pain, palpitations or peripheral edema  GI:  NEGATIVE for nausea, abdominal pain, heartburn, or change in bowel habits  :  Negative. No dysuria, no hematuria  MUSCULOSKELETAL:  See HPI above  NEURO:  NEGATIVE . No headaches, no dizziness,  no numbness  ENDOCRINE:  NEGATIVE for temperature intolerance, skin/hair changes  HEME/ALLERGY/IMMUNE:  NEGATIVE for bleeding problems  PSYCHIATRIC:  NEGATIVE. no anxiety, no depression.     Exam:  Vitals: BP (!) 163/93   Pulse 101   Resp 13   Ht 1.702 m (5' 7\")   Wt 70.3 kg (155 lb)   SpO2 96%   BMI 24.28 kg/m    BMI= Body mass index is 24.28 kg/m .  Constitutional:  healthy, alert and no distress  Neuro: Alert and Oriented x 3, Sensation grossly WNL.  Psych: Affect normal   Respiratory: Breathing not labored.  Cardiovascular: normal peripheral pulses  Lymph: no adenopathy  Skin: No rashes,worrisome lesions or skin problems  Hips show full range of motion without " pain.  He has no tenderness at the sacroiliac joints.  He does have tenderness in the right sciatic notch.  Negative on the left.  He had straight leg raising positive at about 60 on the right. Straight leg raise was tight on the left at 80.  Sensation, motor and circulation are intact.    Assessment:  Right sciatica.  Plan: sulindac 200 mg oral twice daily.  Consider physical therapy for back care.  Consider MRI and epidural steroid injection.

## 2019-07-29 ENCOUNTER — OFFICE VISIT (OUTPATIENT)
Dept: ORTHOPEDICS | Facility: CLINIC | Age: 79
End: 2019-07-29
Payer: COMMERCIAL

## 2019-07-29 ENCOUNTER — ANCILLARY PROCEDURE (OUTPATIENT)
Dept: GENERAL RADIOLOGY | Facility: CLINIC | Age: 79
End: 2019-07-29
Attending: ORTHOPAEDIC SURGERY
Payer: COMMERCIAL

## 2019-07-29 VITALS
SYSTOLIC BLOOD PRESSURE: 147 MMHG | DIASTOLIC BLOOD PRESSURE: 86 MMHG | BODY MASS INDEX: 24.33 KG/M2 | HEART RATE: 89 BPM | WEIGHT: 155 LBS | HEIGHT: 67 IN | RESPIRATION RATE: 16 BRPM

## 2019-07-29 DIAGNOSIS — M25.571 RIGHT ANKLE PAIN, UNSPECIFIED CHRONICITY: ICD-10-CM

## 2019-07-29 DIAGNOSIS — M25.571 RIGHT ANKLE PAIN, UNSPECIFIED CHRONICITY: Primary | ICD-10-CM

## 2019-07-29 DIAGNOSIS — M67.88 ACHILLES TENDONOSIS OF RIGHT LOWER EXTREMITY: ICD-10-CM

## 2019-07-29 PROCEDURE — 73610 X-RAY EXAM OF ANKLE: CPT | Mod: RT

## 2019-07-29 PROCEDURE — 99213 OFFICE O/P EST LOW 20 MIN: CPT | Performed by: ORTHOPAEDIC SURGERY

## 2019-07-29 ASSESSMENT — MIFFLIN-ST. JEOR: SCORE: 1371.71

## 2019-07-29 NOTE — LETTER
7/29/2019         RE: Froy Loredo  [address] [facility] [address]        Dear Colleague,    Thank you for referring your patient, Froy Loredo, to the [facility]. Please see a copy of my visit note below.    Froy Loredo is a 78 year old male who is seen as self referral for right posterior pain.  He has noted pain for approximately 4 weeks.  He notices no specific injury.  He has noted a lump at the back of the heel and this is very tender.  He has most pain with walking.  Better with rest.  He describes an aching pain rated 7 out of 10.    X-ray today shows posterior calcaneal spur within the Achilles tendon.  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 No family hx of      Cancer No family hx of      Hypertension No family hx of      Cerebrovascular Disease No family hx of      Thyroid Disease No family hx of      Glaucoma No family hx of      Macular Degeneration No family hx of      Breast Cancer No family hx of      Prostate Cancer No family hx of      Depression No family hx of      Anxiety Disorder No  family hx of      Mental Illness No family hx of      Substance Abuse No family hx of      Anesthesia Reaction No family hx of      Asthma No family hx of      Osteoporosis No family hx of      Genetic Disorder No family hx of      Obesity No family hx of      Unknown/Adopted No family hx of        Social History     Socioeconomic History     Marital status:      Spouse name: Radha     Number of children: 2     Years of education: Not on file     Highest education level: Not on file   Occupational History     Occupation: Real estate appraisal     Employer: RETIRED     Comment: [place] [employer]     Employer: RETIRED   Social Needs     Financial resource strain: Not on file     Food insecurity:     Worry: Not on file     Inability: Not on file     Transportation needs:     Medical: Not on file     Non-medical: Not on file   Tobacco Use     Smoking status: Never Smoker     Smokeless tobacco: Never Used     Tobacco comment: Lives in smoke free household   Substance and Sexual Activity     Alcohol use: No     Alcohol/week: 0.0 oz     Drug use: No     Sexual activity: Yes     Partners: Female     Birth control/protection: None   Lifestyle     Physical activity:     Days per week: Not on file     Minutes per session: Not on file     Stress: Not on file   Relationships     Social connections:     Talks on phone: Not on file     Gets together: Not on file     Attends Jewish service: Not on file     Active member of club or organization: Not on file     Attends meetings of clubs or organizations: Not on file     Relationship status: Not on file     Intimate partner violence:     Fear of current or ex partner: Not on file     Emotionally abused: Not on file     Physically abused: Not on file     Forced sexual activity: Not on file   Other Topics Concern     Parent/sibling w/ CABG, MI or angioplasty before 65F 55M? No      Service Yes     Blood Transfusions No     Caffeine Concern No     Occupational Exposure No      Hobby Hazards No     Sleep Concern Yes     Comment: occazanaly     Stress Concern No     Weight Concern No     Special Diet No     Back Care No     Exercise Yes     Bike Helmet No     Seat Belt Yes     Self-Exams No   Social History Narrative     Not on file       Current Outpatient Medications   Medication Sig Dispense Refill     finasteride (PROSCAR) 5 MG tablet Take 1 tablet (5 mg) by mouth daily 90 tablet 1     fluocinolone acetonide 0.01 % OIL Use 4-5 drops in right ear 1-2 times daily as needed for itching. 20 mL 1     MULTI-VITAMIN OR  1 tablet DAILY       polyethylene glycol 0.4%- propylene glycol 0.3% (SYSTANE ULTRA) 0.4-0.3 % SOLN ophthalmic solution Place 1 drop into both eyes 2 times daily       sulindac (CLINORIL) 200 MG tablet Take 1 tablet (200 mg) by mouth 2 times daily 60 tablet 11     tamsulosin (FLOMAX) 0.4 MG capsule TAKE 2 CAPSULES DAILY 180 capsule 1     VITAMIN C OR 2 TABLETS DAILY         Allergies   Allergen Reactions     Doxazosin      Lightheadedness 2013, resolved with switch to tamsulosin        REVIEW OF SYSTEMS:  CONSTITUTIONAL:  NEGATIVE for fever, chills, change in weight, not feeling tired  SKIN:  NEGATIVE for worrisome rashes, no skin lumps, no skin ulcers and no non-healing wounds  EYES:  NEGATIVE for vision changes or irritation.  ENT/MOUTH:  NEGATIVE.  No hearing loss, no hoarseness, no difficulty swallowing.  RESP:  NEGATIVE. No cough or shortness of breath.  CV:  NEGATIVE for chest pain, palpitations or peripheral edema  GI:  NEGATIVE for nausea, abdominal pain, heartburn, or change in bowel habits  :  Negative. No dysuria, no hematuria  MUSCULOSKELETAL:  See HPI above  NEURO:  NEGATIVE . No headaches, no dizziness,  no numbness  ENDOCRINE:  NEGATIVE for temperature intolerance, skin/hair changes  HEME/ALLERGY/IMMUNE:  NEGATIVE for bleeding problems  PSYCHIATRIC:  NEGATIVE. no anxiety, no depression.     Exam:  Vitals: BP (!) 147/86   Pulse 89   Resp 16   Ht 1.702 m (5'  "7\")   Wt 70.3 kg (155 lb)   BMI 24.28 kg/m     BMI= Body mass index is 24.28 kg/m .  Constitutional:  healthy, alert and no distress  Neuro: Alert and Oriented x 3, Sensation grossly WNL.  Psych: Affect normal   Respiratory: Breathing not labored.  Cardiovascular: normal peripheral pulses  Lymph: no adenopathy  Skin: No rashes,worrisome lesions or skin problems  There is prominence at the posterior aspect of the right heel at the Achilles insertion.  No similar prominence on the left.  He is tender at the prominence.  He has mild tenderness at the retrocalcaneal bursa.  He has no significant pain with stretching of the foot into dorsiflexion.  He is able to get on tiptoes but has pain in the right Achilles with this.  There is no tenderness under the plantar fascia.  Sensation, motor and circulation are intact.    Assessment: Right Achilles tendinosis with calcaneal spur.  Plan: I discussed trying to avoid pressure on this by heel lifts, backless shoe, or cutting the back of the shoe away to relieve pressure.  We have also discussed possible cast boot to decrease inflammation.  Surgical excision of this would be very difficult as most of the calcification is within the Achilles tendon.  I am not sure that excision of the posterior superior tuberosity would benefit him as it does not appear very prominent.  For now he will try backless shoe or cutting the back of the shoe away and see if this resolves.    Again, thank you for allowing me to participate in the care of your patient.        Sincerely,        Carlos Drake MD    "

## 2019-07-29 NOTE — PATIENT INSTRUCTIONS
Diagnosis:  Posterior calcaneal spur.  Calcification within the Achilles tendon.  Try cutting the back of a shoe or getting a backless shoe like a Haflinger shoe.

## 2019-08-04 PROBLEM — M67.88 ACHILLES TENDONOSIS OF RIGHT LOWER EXTREMITY: Status: ACTIVE | Noted: 2019-08-04

## 2019-08-04 NOTE — PROGRESS NOTES
Froy Loredo is a 78 year old male who is seen as self referral for right posterior pain.  He has noted pain for approximately 4 weeks.  He notices no specific injury.  He has noted a lump at the back of the heel and this is very tender.  He has most pain with walking.  Better with rest.  He describes an aching pain rated 7 out of 10.    X-ray today shows posterior calcaneal spur within the Achilles tendon.  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 No family hx of      Cancer No family hx of      Hypertension No family hx of      Cerebrovascular Disease No family hx of      Thyroid Disease No family hx of      Glaucoma No family hx of      Macular Degeneration No family hx of      Breast Cancer No family hx of      Prostate Cancer No family hx of      Depression No family hx of      Anxiety Disorder No family hx of      Mental Illness No family hx of      Substance Abuse No family hx of      Anesthesia Reaction No family hx of      Asthma No family hx of      Osteoporosis No family hx of      Genetic Disorder No family hx of      Obesity No family hx  of      Unknown/Adopted No family hx of        Social History     Socioeconomic History     Marital status:      Spouse name: Radha     Number of children: 2     Years of education: Not on file     Highest education level: Not on file   Occupational History     Occupation: Real estate appraisal     Employer: RETIRED     Comment: [place] [employer]     Employer: RETIRED   Social Needs     Financial resource strain: Not on file     Food insecurity:     Worry: Not on file     Inability: Not on file     Transportation needs:     Medical: Not on file     Non-medical: Not on file   Tobacco Use     Smoking status: Never Smoker     Smokeless tobacco: Never Used     Tobacco comment: Lives in smoke free household   Substance and Sexual Activity     Alcohol use: No     Alcohol/week: 0.0 oz     Drug use: No     Sexual activity: Yes     Partners: Female     Birth control/protection: None   Lifestyle     Physical activity:     Days per week: Not on file     Minutes per session: Not on file     Stress: Not on file   Relationships     Social connections:     Talks on phone: Not on file     Gets together: Not on file     Attends Christianity service: Not on file     Active member of club or organization: Not on file     Attends meetings of clubs or organizations: Not on file     Relationship status: Not on file     Intimate partner violence:     Fear of current or ex partner: Not on file     Emotionally abused: Not on file     Physically abused: Not on file     Forced sexual activity: Not on file   Other Topics Concern     Parent/sibling w/ CABG, MI or angioplasty before 65F 55M? No      Service Yes     Blood Transfusions No     Caffeine Concern No     Occupational Exposure No     Hobby Hazards No     Sleep Concern Yes     Comment: [employer]     Stress Concern No     Weight Concern No     Special Diet No     Back Care No     Exercise Yes     Bike Helmet No     Seat Belt Yes     Self-Exams No   Social History Narrative     Not on  "file       Current Outpatient Medications   Medication Sig Dispense Refill     finasteride (PROSCAR) 5 MG tablet Take 1 tablet (5 mg) by mouth daily 90 tablet 1     fluocinolone acetonide 0.01 % OIL Use 4-5 drops in right ear 1-2 times daily as needed for itching. 20 mL 1     MULTI-VITAMIN OR  1 tablet DAILY       polyethylene glycol 0.4%- propylene glycol 0.3% (SYSTANE ULTRA) 0.4-0.3 % SOLN ophthalmic solution Place 1 drop into both eyes 2 times daily       sulindac (CLINORIL) 200 MG tablet Take 1 tablet (200 mg) by mouth 2 times daily 60 tablet 11     tamsulosin (FLOMAX) 0.4 MG capsule TAKE 2 CAPSULES DAILY 180 capsule 1     VITAMIN C OR 2 TABLETS DAILY         Allergies   Allergen Reactions     Doxazosin      Lightheadedness 2013, resolved with switch to tamsulosin        REVIEW OF SYSTEMS:  CONSTITUTIONAL:  NEGATIVE for fever, chills, change in weight, not feeling tired  SKIN:  NEGATIVE for worrisome rashes, no skin lumps, no skin ulcers and no non-healing wounds  EYES:  NEGATIVE for vision changes or irritation.  ENT/MOUTH:  NEGATIVE.  No hearing loss, no hoarseness, no difficulty swallowing.  RESP:  NEGATIVE. No cough or shortness of breath.  CV:  NEGATIVE for chest pain, palpitations or peripheral edema  GI:  NEGATIVE for nausea, abdominal pain, heartburn, or change in bowel habits  :  Negative. No dysuria, no hematuria  MUSCULOSKELETAL:  See HPI above  NEURO:  NEGATIVE . No headaches, no dizziness,  no numbness  ENDOCRINE:  NEGATIVE for temperature intolerance, skin/hair changes  HEME/ALLERGY/IMMUNE:  NEGATIVE for bleeding problems  PSYCHIATRIC:  NEGATIVE. no anxiety, no depression.     Exam:  Vitals: BP (!) 147/86   Pulse 89   Resp 16   Ht 1.702 m (5' 7\")   Wt 70.3 kg (155 lb)   BMI 24.28 kg/m    BMI= Body mass index is 24.28 kg/m .  Constitutional:  healthy, alert and no distress  Neuro: Alert and Oriented x 3, Sensation grossly WNL.  Psych: Affect normal   Respiratory: Breathing not " labored.  Cardiovascular: normal peripheral pulses  Lymph: no adenopathy  Skin: No rashes,worrisome lesions or skin problems  There is prominence at the posterior aspect of the right heel at the Achilles insertion.  No similar prominence on the left.  He is tender at the prominence.  He has mild tenderness at the retrocalcaneal bursa.  He has no significant pain with stretching of the foot into dorsiflexion.  He is able to get on tiptoes but has pain in the right Achilles with this.  There is no tenderness under the plantar fascia.  Sensation, motor and circulation are intact.    Assessment: Right Achilles tendinosis with calcaneal spur.  Plan: I discussed trying to avoid pressure on this by heel lifts, backless shoe, or cutting the back of the shoe away to relieve pressure.  We have also discussed possible cast boot to decrease inflammation.  Surgical excision of this would be very difficult as most of the calcification is within the Achilles tendon.  I am not sure that excision of the posterior superior tuberosity would benefit him as it does not appear very prominent.  For now he will try backless shoe or cutting the back of the shoe away and see if this resolves.

## 2019-09-16 ENCOUNTER — OFFICE VISIT (OUTPATIENT)
Dept: ORTHOPEDICS | Facility: CLINIC | Age: 79
End: 2019-09-16
Payer: COMMERCIAL

## 2019-09-16 VITALS
DIASTOLIC BLOOD PRESSURE: 78 MMHG | BODY MASS INDEX: 24.33 KG/M2 | SYSTOLIC BLOOD PRESSURE: 127 MMHG | HEART RATE: 103 BPM | WEIGHT: 155 LBS | HEIGHT: 67 IN | RESPIRATION RATE: 16 BRPM

## 2019-09-16 DIAGNOSIS — M77.31 CALCANEAL SPUR OF RIGHT FOOT: Primary | ICD-10-CM

## 2019-09-16 DIAGNOSIS — M67.88 ACHILLES TENDONOSIS OF RIGHT LOWER EXTREMITY: ICD-10-CM

## 2019-09-16 PROCEDURE — 99213 OFFICE O/P EST LOW 20 MIN: CPT | Performed by: ORTHOPAEDIC SURGERY

## 2019-09-16 ASSESSMENT — MIFFLIN-ST. JEOR: SCORE: 1371.71

## 2019-09-16 NOTE — LETTER
"    9/16/2019         RE: Froy Loredo  [address] [facility] [address]        Dear Colleague,    Thank you for referring your patient, Froy Loredo, to the [facility]. Please see a copy of my visit note below.    HISTORY OF PRESENT ILLNESS:    Froy Loredo is a 78 year old male who is seen in follow up for   Chief Complaint   Patient presents with     RECHECK     Right achilles teninosis with calcaneal spur.   Present symptoms: Patient reports right ankle pain has become worse again shortly after last visit which was 7/29/19.  Patient reports he has cut the back off a tennis shoe which is helpful.  He used to have a heel lift but had lost this.  He states this was helpful too.  Occasionally he takes excedrin for pain.  This is painful to walk.  Treatments tried to this point: as above  Patient evaluation done with Dr. Drake    Physical Exam:  Vitals: /78   Pulse 103   Resp 16   Ht 1.702 m (5' 7\")   Wt 70.3 kg (155 lb)   BMI 24.28 kg/m     BMI= Body mass index is 24.28 kg/m .  Constitutional: healthy, alert and no acute distress   Psychiatric: mentation appears normal and affect normal/bright  NEURO: no focal deficits  SKIN: no excoriation or erythema. No signs of infection.  JOINT/EXTREMITIES:  Affected extremity pulses are easily palpable.  Right foot with very prominent bump at the insertion of the achilles.  This is present on left as well though not as prominent.  Patient with tenderness on palpation at distal achilles as it inserts to proximal calcaneus (prominent region)  He also has tenderness medially and laterally to prominence.    Patient maintains range of motion  No visible swelling or redness  GAIT: minimally antalgic  Footwear is [employer] with a back.    IMAGING INTERPRETATION:  Xray images reviewed which reveal significant proximal reaching calcaneal spur.   Independent visualization of the images was performed.     ASSESSMENT:    Chief " Complaint   Patient presents with     RECHECK     Right achilles teninosis with calcaneal spur.       ICD-10-CM    1. Calcaneal spur of right foot M77.31    2. Achilles tendonosis of right lower extremity M76.61      Patient with tendinosis of achilles which may be related to prominent calcaneal spur.     Plan:   Patient advised to return to use of backless shoes  Some shoe brands were discussed  Patient issued 2 replacement heel lifts.  One placed in right shoe.  Advised to purchase adhesive OTC if wants to use same shoes.  Surgery options discussed with osteotomy, removal of spur or both though patient advised that guarantee of elimination of pain not necessarily appreciated since spurs form out of tendinosis at achilles.  This would require a minimum of 4 months recovery.  Return to clinic PRN or to discuss surgery options.    BP Readings from Last 1 Encounters:   09/16/19 127/78     BP noted to be well controlled today in office.       Scribed by  Brittney Parra PA-C   9/16/2019  1:58 PM      I attest I have seen and evaluated the patient.  I agree with above impression and plan.    Carlos Drake MD    Again, thank you for allowing me to participate in the care of your patient.        Sincerely,        Carlos Drake MD

## 2019-09-16 NOTE — PROGRESS NOTES
"HISTORY OF PRESENT ILLNESS:    Froy Loredo is a 78 year old male who is seen in follow up for   Chief Complaint   Patient presents with     RECHECK     Right achilles teninosis with calcaneal spur.   Present symptoms: Patient reports right ankle pain has become worse again shortly after last visit which was 7/29/19.  Patient reports he has cut the back off a tennis shoe which is helpful.  He used to have a heel lift but had lost this.  He states this was helpful too.  Occasionally he takes excedrin for pain.  This is painful to walk.  Treatments tried to this point: as above  Patient evaluation done with Dr. Drake    Physical Exam:  Vitals: /78   Pulse 103   Resp 16   Ht 1.702 m (5' 7\")   Wt 70.3 kg (155 lb)   BMI 24.28 kg/m    BMI= Body mass index is 24.28 kg/m .  Constitutional: healthy, alert and no acute distress   Psychiatric: mentation appears normal and affect normal/bright  NEURO: no focal deficits  SKIN: no excoriation or erythema. No signs of infection.  JOINT/EXTREMITIES:  Affected extremity pulses are easily palpable.  Right foot with very prominent bump at the insertion of the achilles.  This is present on left as well though not as prominent.  Patient with tenderness on palpation at distal achilles as it inserts to proximal calcaneus (prominent region)  He also has tenderness medially and laterally to prominence.    Patient maintains range of motion  No visible swelling or redness  GAIT: minimally antalgic  Footwear is [employer] with a back.    IMAGING INTERPRETATION:  Xray images reviewed which reveal significant proximal reaching calcaneal spur.   Independent visualization of the images was performed.     ASSESSMENT:    Chief Complaint   Patient presents with     RECHECK     Right achilles teninosis with calcaneal spur.       ICD-10-CM    1. Calcaneal spur of right foot M77.31    2. Achilles tendonosis of right lower extremity M76.61      Patient with tendinosis of " achilles which may be related to prominent calcaneal spur.     Plan:   Patient advised to return to use of backless shoes  Some shoe brands were discussed  Patient issued 2 replacement heel lifts.  One placed in right shoe.  Advised to purchase adhesive OTC if wants to use same shoes.  Surgery options discussed with osteotomy, removal of spur or both though patient advised that guarantee of elimination of pain not necessarily appreciated since spurs form out of tendinosis at achilles.  This would require a minimum of 4 months recovery.  Return to clinic PRN or to discuss surgery options.    BP Readings from Last 1 Encounters:   09/16/19 127/78     BP noted to be well controlled today in office.       Scribed by  Brittney Parra PA-C   9/16/2019  1:58 PM      I attest I have seen and evaluated the patient.  I agree with above impression and plan.    Carlos Drake MD

## 2019-09-20 DIAGNOSIS — N40.1 BENIGN PROSTATIC HYPERPLASIA WITH URINARY OBSTRUCTION: ICD-10-CM

## 2019-09-20 DIAGNOSIS — N13.8 BENIGN PROSTATIC HYPERPLASIA WITH URINARY OBSTRUCTION: ICD-10-CM

## 2019-09-20 RX ORDER — TAMSULOSIN HYDROCHLORIDE 0.4 MG/1
CAPSULE ORAL
Qty: 60 CAPSULE | Refills: 0 | Status: SHIPPED | OUTPATIENT
Start: 2019-09-20 | End: 2019-10-16

## 2019-09-20 NOTE — TELEPHONE ENCOUNTER
Medication is being filled for 1 time refill only due to:  Patient needs to be seen because it has been more than one year since last visit. due for annual exam.  Cece CARTWRIGHTN, RN

## 2019-09-20 NOTE — LETTER
September 20, 2019    Froy Basurtoahan  [address] [facility] [address]        Dear Froy,       We recently received a refill request for tamsulosin (FLOMAX) 0.4 MG capsule.  We have refilled this for a one time 30 day supply only because you are overdue for a:    Annual Exam office visit      Please call at your earliest convenience so that there will not be a delay with your future refills.          Thank you,   Your [facility] Team/jeb  [phone]

## 2019-10-15 DIAGNOSIS — N40.1 BENIGN PROSTATIC HYPERPLASIA WITH URINARY OBSTRUCTION: ICD-10-CM

## 2019-10-15 DIAGNOSIS — N13.8 BENIGN PROSTATIC HYPERPLASIA WITH URINARY OBSTRUCTION: ICD-10-CM

## 2019-10-16 DIAGNOSIS — N40.1 BENIGN PROSTATIC HYPERPLASIA WITH URINARY OBSTRUCTION: ICD-10-CM

## 2019-10-16 DIAGNOSIS — N13.8 BENIGN PROSTATIC HYPERPLASIA WITH URINARY OBSTRUCTION: ICD-10-CM

## 2019-10-16 RX ORDER — TAMSULOSIN HYDROCHLORIDE 0.4 MG/1
CAPSULE ORAL
Qty: 60 CAPSULE | Refills: 0 | Status: SHIPPED | OUTPATIENT
Start: 2019-10-16

## 2019-10-16 RX ORDER — TAMSULOSIN HYDROCHLORIDE 0.4 MG/1
CAPSULE ORAL
Qty: 60 CAPSULE | Refills: 0 | OUTPATIENT
Start: 2019-10-16

## 2019-10-16 NOTE — TELEPHONE ENCOUNTER
Routing refill request to provider for review/approval because:  Crystal given x1 and patient did not follow up, please advise  Cece Craig BSN, RN

## 2019-11-20 ENCOUNTER — OFFICE VISIT (OUTPATIENT)
Dept: PODIATRY | Facility: CLINIC | Age: 79
End: 2019-11-20
Payer: COMMERCIAL

## 2019-11-20 VITALS
HEART RATE: 100 BPM | SYSTOLIC BLOOD PRESSURE: 148 MMHG | WEIGHT: 155 LBS | DIASTOLIC BLOOD PRESSURE: 76 MMHG | BODY MASS INDEX: 24.28 KG/M2

## 2019-11-20 DIAGNOSIS — M67.88 ACHILLES TENDONOSIS OF RIGHT LOWER EXTREMITY: Primary | ICD-10-CM

## 2019-11-20 PROCEDURE — 99203 OFFICE O/P NEW LOW 30 MIN: CPT | Performed by: PODIATRIST

## 2019-11-20 NOTE — PATIENT INSTRUCTIONS
Weight management plan: Patient was referred to their PCP to discuss a diet and exercise plan. Froy to follow up with Primary Care provider regarding elevated blood pressure.    We wish you continued good healing. If you have any questions or concerns, please do not hesitate to contact us at [phone]    Please remember to call and schedule a follow up appointment if one was recommended at your earliest convenience.   PODIATRY CLINIC HOURS  TELEPHONE NUMBER    Dr. Selwyn Jackman D.P.M FAC FAS    Clinics:  [facility] Galicia  [facility]    [facility]ette Valle [facility]   Tuesday 1PM-6PM  [place]  Wednesday 7AM-2PM  [place]  Thursday 10AM-6PM  [place]  Friday 7AM-3PM  [place]  Specialty schedulers:   [phone] to make an appointment with any Specialty Provider.        Urgent Care locations:    [facility] Monday-Friday 5 pm - 9 pm. Saturday-Sunday 9 am -5pm    Monday-Friday 11 am - 9 pm Saturday 9 am - 5 pm     Monday-Sunday 12 noon-8PM [phone]     If you need a medication refill, please contact us you may need lab work and/or a follow up visit prior to your refill (i.e. Antifungal medications).    [facility] (secure e-mail communication and access to your chart) to send a message or to make an appointment.    If MRI needed please call Grayson Bonilla at [phone]

## 2019-11-20 NOTE — LETTER
11/20/2019         RE: Froy Loredo  [address] [address] [address]        Dear Colleague,    Thank you for referring your patient, Froy Loredo, to the [facility]. Please see a copy of my visit note below.      Pt is seen today as a new pt referral with the c/c of painful right foot.  This has been symptomatic for the past 2 years.  Pt denies any hx of trauma.  Aggravated by activity and relieved by rest.  Describes as burning pain.  Is slowly getting worse.  Going up stairs is painful.  Points to posterior heel.  Has seen orthopedics in the past and given a heel lift.  Patient is retired.    ROS:  A 10-point review of systems was performed and is positive for that noted in the HPI and as seen above.  All other areas are negative.          Allergies   Allergen Reactions     Doxazosin      Lightheadedness 2013, resolved with switch to tamsulosin        Current Outpatient Medications   Medication Sig Dispense Refill     finasteride (PROSCAR) 5 MG tablet Take 1 tablet (5 mg) by mouth daily 90 tablet 1     fluocinolone acetonide 0.01 % OIL Use 4-5 drops in right ear 1-2 times daily as needed for itching. 20 mL 1     MULTI-VITAMIN OR  1 tablet DAILY       polyethylene glycol 0.4%- propylene glycol 0.3% (SYSTANE ULTRA) 0.4-0.3 % SOLN ophthalmic solution Place 1 drop into both eyes 2 times daily       sulindac (CLINORIL) 200 MG tablet Take 1 tablet (200 mg) by mouth 2 times daily 60 tablet 11     tamsulosin (FLOMAX) 0.4 MG capsule TAKE 2 CAPSULES BY MOUTH EVERY DAY 60 capsule 0     VITAMIN C OR 2 TABLETS DAILY         Patient Active Problem List   Diagnosis     Benign prostatic hyperplasia with urinary obstruction     CARDIOVASCULAR SCREENING; LDL GOAL LESS THAN 160     Advanced directives, counseling/discussion     History of skin cancer     AK (actinic keratosis)     ED (erectile dysfunction)     Health Care Home     Lymphopenia     Carcinoma in situ     Right arm cellulitis      Microscopic hematuria     Gastroesophageal reflux disease without esophagitis     PSC (posterior subcapsular cataract), left     Nuclear sclerotic cataract of both eyes     Right sided sciatica     Achilles tendonosis of right lower extremity       Past Medical History:   Diagnosis Date     Actinic keratosis      Basal cell carcinoma 07/02/2008    left chest     BPH (benign prostatic hypertrophy) with urinary obstruction <2003     Eczema      PSC (posterior subcapsular cataract), left 5/10/2016     Skin cancer 11/8/2006    sccis L helix     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 No family hx of      Cancer No family hx of      Hypertension No family hx of      Cerebrovascular Disease No family hx of      Thyroid Disease No family hx of      Glaucoma No family hx of      Macular Degeneration No family hx of      Breast Cancer No family hx of      Prostate Cancer No family hx of      Depression No family hx of      Anxiety Disorder No family hx of      Mental Illness No family hx of      Substance Abuse No family hx of      Anesthesia Reaction No family hx of      Asthma No family hx of      Osteoporosis No family hx of      Genetic Disorder No family hx of      Obesity No family hx of      Unknown/Adopted No family hx of        Social History     Tobacco Use     Smoking status: Never Smoker     Smokeless tobacco: Never Used     Tobacco comment: Lives in smoke free household   Substance Use Topics     Alcohol use: No      Alcohol/week: 0.0 standard drinks         Exam:    Vitals: BP (!) 148/76   Pulse 100   Wt 70.3 kg (155 lb)   BMI 24.28 kg/m     BMI: Body mass index is 24.28 kg/m .  Height: Data Unavailable    Constitutional/ general:  Pt is in no apparent distress, appears well-nourished.  Cooperative with history and physical exam.     Psych:  The patient answered questions appropriately.  Normal affect.  Seems to have reasonable expectations, in terms of treatment.  Patient often is repeating today seemingly to forget what I just told him earlier    Eyes:  Visual scanning/ tracking without deficit.     Ears:  Response to auditory stimuli is normal.  negative hearing aid devices.  Auricles in proper alignment.     Lymphatic:  Popliteal lymph nodes not enlarged.     Lungs:  Non labored breathing, non labored speech. No cough.  No audible wheezing. Even, quiet breathing.       Vascular:  positive pedal pulses bilaterally for both the DP and PT arteries.  CFT < 3 sec.  negative ankle edema.  positive pedal hair growth.    Neuro:  Alert and oriented x 3. Coordinated gait.  Light touch sensation is intact to the L4, L5, S1 distributions. No obvious deficits.  No evidence of neurological-based weakness, spasticity, or contracture in the lower extremities.      Derm: Normal texture and turgor.  No erythema, ecchymosis, or cyanosis.      Musculoskeletal:    Patient is ambulatory without an assistive device or brace.   Cavus foot with weightbearing bilateral.  No forefoot or rear foot deformities noted.  MS 5/5 all compartments.  Normal ROM all fore foot and rearfoot joints with no pain or crepitus.  No equinus.  Pain on posterior  calcaneus right foot and this is somewhat prominent.  Slight edema.  No masses.  No ecchymosis.  Has some pain centrally at the insertion but more pain in the area of the retrocalcaneal bursa     Radiographic Exam:    RIGHT ANKLE THREE OR MORE VIEWS 7/29/2019 1:43 PM      HISTORY: Right ankle pain,  unspecified chronicity.     COMPARISON: None.                                                                      IMPRESSION: No acute bony abnormality. Small bony spur extending from  the medial malleolus is likely related to old injury. Prominent  posterior calcaneal spur and/or ossification in the distal Achilles  tendon.    A:  Insertional Achilles Tendonitis/retrocalcaneal bursitis right foot     Plan:  X-rays from past personally reviewed.  Discussed etiology and treatment options in detail w/ the pt. patient is often around the house wearing slippers or poor shoes.  Discussed the importance of a good shoe at all times and I made suggestions.  I am not quite sure that he has been using heel lifts in the past.  We dispensed heel lift.  Patient to wear good shoes at all times.  Discussed physical therapy.  He will call if he would like to try this.  Avoid activities that aggravated this.  Briefly discussed surgery but not sure if patient good surgical candidate.  RTC as needed    Selwyn Jackman DPM DPM, FACFAS         Again, thank you for allowing me to participate in the care of your patient.        Sincerely,        Selwyn Jackman DPM

## 2019-11-22 NOTE — PROGRESS NOTES
Pt is seen today as a new pt referral with the c/c of painful right foot.  This has been symptomatic for the past 2 years.  Pt denies any hx of trauma.  Aggravated by activity and relieved by rest.  Describes as burning pain.  Is slowly getting worse.  Going up stairs is painful.  Points to posterior heel.  Has seen orthopedics in the past and given a heel lift.  Patient is retired.    ROS:  A 10-point review of systems was performed and is positive for that noted in the HPI and as seen above.  All other areas are negative.          Allergies   Allergen Reactions     Doxazosin      Lightheadedness 2013, resolved with switch to tamsulosin        Current Outpatient Medications   Medication Sig Dispense Refill     finasteride (PROSCAR) 5 MG tablet Take 1 tablet (5 mg) by mouth daily 90 tablet 1     fluocinolone acetonide 0.01 % OIL Use 4-5 drops in right ear 1-2 times daily as needed for itching. 20 mL 1     MULTI-VITAMIN OR  1 tablet DAILY       polyethylene glycol 0.4%- propylene glycol 0.3% (SYSTANE ULTRA) 0.4-0.3 % SOLN ophthalmic solution Place 1 drop into both eyes 2 times daily       sulindac (CLINORIL) 200 MG tablet Take 1 tablet (200 mg) by mouth 2 times daily 60 tablet 11     tamsulosin (FLOMAX) 0.4 MG capsule TAKE 2 CAPSULES BY MOUTH EVERY DAY 60 capsule 0     VITAMIN C OR 2 TABLETS DAILY         Patient Active Problem List   Diagnosis     Benign prostatic hyperplasia with urinary obstruction     CARDIOVASCULAR SCREENING; LDL GOAL LESS THAN 160     Advanced directives, counseling/discussion     History of skin cancer     AK (actinic keratosis)     ED (erectile dysfunction)     Health Care Home     Lymphopenia     Carcinoma in situ     Right arm cellulitis     Microscopic hematuria     Gastroesophageal reflux disease without esophagitis     PSC (posterior subcapsular cataract), left     Nuclear sclerotic cataract of both eyes     Right sided sciatica     Achilles tendonosis of right lower extremity        Past Medical History:   Diagnosis Date     Actinic keratosis      Basal cell carcinoma 07/02/2008    left chest     BPH (benign prostatic hypertrophy) with urinary obstruction <2003     Eczema      PSC (posterior subcapsular cataract), left 5/10/2016     Skin cancer 11/8/2006    sccis L helix     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 No family hx of      Cancer No family hx of      Hypertension No family hx of      Cerebrovascular Disease No family hx of      Thyroid Disease No family hx of      Glaucoma No family hx of      Macular Degeneration No family hx of      Breast Cancer No family hx of      Prostate Cancer No family hx of      Depression No family hx of      Anxiety Disorder No family hx of      Mental Illness No family hx of      Substance Abuse No family hx of      Anesthesia Reaction No family hx of      Asthma No family hx of      Osteoporosis No family hx of      Genetic Disorder No family hx of      Obesity No family hx of      Unknown/Adopted No family hx of        Social History     Tobacco Use     Smoking status: Never Smoker     Smokeless tobacco: Never Used     Tobacco comment: Lives in smoke free household   Substance Use Topics     Alcohol use: No     Alcohol/week: 0.0 standard drinks         Exam:    Vitals: BP (!) 148/76   Pulse 100   Wt 70.3 kg (155 lb)   BMI 24.28 kg/m    BMI: Body mass index is 24.28 kg/m .  Height: Data Unavailable    Constitutional/ general:  Pt is in no apparent distress,  appears well-nourished.  Cooperative with history and physical exam.     Psych:  The patient answered questions appropriately.  Normal affect.  Seems to have reasonable expectations, in terms of treatment.  Patient often is repeating today seemingly to forget what I just told him earlier    Eyes:  Visual scanning/ tracking without deficit.     Ears:  Response to auditory stimuli is normal.  negative hearing aid devices.  Auricles in proper alignment.     Lymphatic:  Popliteal lymph nodes not enlarged.     Lungs:  Non labored breathing, non labored speech. No cough.  No audible wheezing. Even, quiet breathing.       Vascular:  positive pedal pulses bilaterally for both the DP and PT arteries.  CFT < 3 sec.  negative ankle edema.  positive pedal hair growth.    Neuro:  Alert and oriented x 3. Coordinated gait.  Light touch sensation is intact to the L4, L5, S1 distributions. No obvious deficits.  No evidence of neurological-based weakness, spasticity, or contracture in the lower extremities.      Derm: Normal texture and turgor.  No erythema, ecchymosis, or cyanosis.      Musculoskeletal:    Patient is ambulatory without an assistive device or brace.   Cavus foot with weightbearing bilateral.  No forefoot or rear foot deformities noted.  MS 5/5 all compartments.  Normal ROM all fore foot and rearfoot joints with no pain or crepitus.  No equinus.  Pain on posterior  calcaneus right foot and this is somewhat prominent.  Slight edema.  No masses.  No ecchymosis.  Has some pain centrally at the insertion but more pain in the area of the retrocalcaneal bursa     Radiographic Exam:    RIGHT ANKLE THREE OR MORE VIEWS 7/29/2019 1:43 PM      HISTORY: Right ankle pain, unspecified chronicity.     COMPARISON: None.                                                                      IMPRESSION: No acute bony abnormality. Small bony spur extending from  the medial malleolus is likely related to old injury. Prominent  posterior  calcaneal spur and/or ossification in the distal Achilles  tendon.    A:  Insertional Achilles Tendonitis/retrocalcaneal bursitis right foot     Plan:  X-rays from past personally reviewed.  Discussed etiology and treatment options in detail w/ the pt. patient is often around the house wearing slippers or poor shoes.  Discussed the importance of a good shoe at all times and I made suggestions.  I am not quite sure that he has been using heel lifts in the past.  We dispensed heel lift.  Patient to wear good shoes at all times.  Discussed physical therapy.  He will call if he would like to try this.  Avoid activities that aggravated this.  Briefly discussed surgery but not sure if patient good surgical candidate.  RTC as needed    Selwyn Jackman, YAMINI DPM, FACFAS

## 2020-01-09 ENCOUNTER — OFFICE VISIT (OUTPATIENT)
Dept: URGENT CARE | Facility: URGENT CARE | Age: 80
End: 2020-01-09
Payer: COMMERCIAL

## 2020-01-09 ENCOUNTER — ANCILLARY PROCEDURE (OUTPATIENT)
Dept: GENERAL RADIOLOGY | Facility: CLINIC | Age: 80
End: 2020-01-09
Attending: FAMILY MEDICINE
Payer: COMMERCIAL

## 2020-01-09 VITALS
SYSTOLIC BLOOD PRESSURE: 130 MMHG | TEMPERATURE: 98.1 F | HEART RATE: 73 BPM | BODY MASS INDEX: 24.28 KG/M2 | WEIGHT: 155 LBS | DIASTOLIC BLOOD PRESSURE: 70 MMHG | OXYGEN SATURATION: 97 %

## 2020-01-09 DIAGNOSIS — M25.552 HIP PAIN, LEFT: Primary | ICD-10-CM

## 2020-01-09 PROCEDURE — 99214 OFFICE O/P EST MOD 30 MIN: CPT | Performed by: FAMILY MEDICINE

## 2020-01-09 PROCEDURE — 73502 X-RAY EXAM HIP UNI 2-3 VIEWS: CPT

## 2020-01-09 NOTE — LETTER
January 10, 2020      Froy Loredo  [address] [address] [id number]        Dear ,    We are writing to inform you of your test results.    There is progression of arthritis in your hip compared to your previous xray.   Please follow up with orthopedics or Dr. Drake.      Resulted Orders   XR Pelvis w Hip Left G/E 2 Views    Narrative    PELVIS AND HIP LEFT TWO VIEWS   1/9/2020 6:20 PM     HISTORY: Hip pain, left    COMPARISON: Radiographs from 7/3/2017.    FINDINGS: Moderate osteoarthrosis in the right hip and moderate to  severe osteoarthrosis in the left hip. These findings have progressed.  There are moderate degenerative changes in the lower lumbar spine and  there are mild degenerative changes in the SI joints and pubic  symphysis. There is no evidence of fracture or osteonecrosis.      Impression    IMPRESSION: Progression of the degenerative changes, particularly in  the left hip.    NOHEMI DAVIDSON MD       If you have any questions or concerns, please call the clinic at the number listed above.       Sincerely,        Jade Draper MD

## 2020-01-09 NOTE — PROGRESS NOTES
Chief complaint: left hip pain    Has had left hip pain for the past few years   Comes and goes   Today is not too bad  Yesterday was just acting up more  Today is better  He's just got tired of it and hence came in to urgent care tonight     In 2017 saw Dr. Drake for his hip pain   History of trauma: none     Problem list, Medication list, Allergies, and Medical/Social/Surgical histories reviewed in [facility] and updated as appropriate.        REVIEW OF SYSTEMS  General: negative for fever, constitutional symptoms or weight loss  Resp: negative for chest pain or shortness of breath  CV: negative for chest pain  : negative for dysuria , incontinence, frequency  Musculoskeletal: as above  Neurologic: negative for ataxia, saddle anesthesia, fecal or urinary incontinence, one sided weakness,  Paresthesias  Constitutional, HEENT, cardiovascular, pulmonary, gi and gu systems are negative, except as otherwise noted.    Physical Exam:  Vitals: /70   Pulse 73   Temp 98.1  F (36.7  C) (Oral)   Wt 70.3 kg (155 lb)   SpO2 97%   BMI 24.28 kg/m    BMI= Body mass index is 24.28 kg/m .  Constitutional: healthy, alert and no acute distress   Head: atraumatic  CARDIO: regular in rate and rhythm no murmurs rubs or gallops  RESP: lungs clear to auscultation  ABDOMEN: soft nontender  NEURO: Patellar reflexes intact and equal b/l  BACK:  Straight leg raise intact, No spine tenderness  no paraspinal muscle tenderness to palpation, strength intact and equal b/l lower extremities. Sensory intact. Rectal exam declined/deferred.   Tenderness on lateral hip area  GAIT: intact  Psychiatric: mentation appears normal and affect normal/bright      Impression:    ICD-10-CM    1. Hip pain, left M25.552 ORTHOPEDICS ADULT REFERRAL     XR Pelvis w Hip Left G/E 2 Views     CANCELED: XR Pelvis and Hip Bilateral 2 Views         Plan:  Lateral hip pain ongoing for years- was bad yesterday but a lot better today   xrays to my review no fracture,  some arthritis - official report pending  Recommend follow up with orthopedics - patient already established with Dr. Drake  Alarm signs or symptoms discussed, if present recommend go to ER   activity modifications advised.  Over the counter medications discussed. Patient aware to avoid NSAIDS if with any kidney disease or ulcers. Proper dosing of over the counter medications likewise discussed.  Adverse reaction to medication discussed.  aware to come in right away if with any fever or chills, worsening symptoms, headache, bowel or bladder incontinence, motor or sensory deficits or gait disturbances.   follow-up recommended.      Jade Draper MD

## 2020-01-16 ENCOUNTER — OFFICE VISIT (OUTPATIENT)
Dept: ORTHOPEDICS | Facility: CLINIC | Age: 80
End: 2020-01-16
Payer: COMMERCIAL

## 2020-01-16 VITALS
HEART RATE: 98 BPM | WEIGHT: 155 LBS | SYSTOLIC BLOOD PRESSURE: 147 MMHG | HEIGHT: 67 IN | DIASTOLIC BLOOD PRESSURE: 90 MMHG | BODY MASS INDEX: 24.33 KG/M2 | OXYGEN SATURATION: 97 %

## 2020-01-16 DIAGNOSIS — M16.12 PRIMARY OSTEOARTHRITIS OF LEFT HIP: Primary | ICD-10-CM

## 2020-01-16 PROCEDURE — 99203 OFFICE O/P NEW LOW 30 MIN: CPT | Performed by: ORTHOPAEDIC SURGERY

## 2020-01-16 ASSESSMENT — MIFFLIN-ST. JEOR: SCORE: 1366.71

## 2020-01-16 NOTE — PROGRESS NOTES
SUBJECTIVE:   Froy Loredo is a 79 year old male who is seen in consultation at the request of Dr. Draper for evaluation of left hip pain that has been present on and off for the last 2 years. No known injury. A couple weeks ago, he had severe pain which was unusual, but it has since gotten better. When he gets the pain, it's laterally and deep. When it hurts, he reports it hurts to walk and while sitting. He denies night pain. No history of lower back problems.     Review of Systems:  Constitutional:  NEGATIVE for fever, chills, change in weight  Integumentary/Skin:  NEGATIVE for worrisome rashes, moles or lesions  Eyes:  NEGATIVE for vision changes or irritation  ENT/Mouth:  NEGATIVE for ear, mouth and throat problems  Resp:  NEGATIVE for significant cough or SOB  CV:  NEGATIVE for chest pain, palpitations or peripheral edema  GI:  NEGATIVE for nausea, abdominal pain, heartburn, or change in bowel habits  :  Negative   Musculoskeletal:  See HPI above  Neuro:  NEGATIVE for weakness, dizziness or paresthesias  Endocrine:  NEGATIVE for temperature intolerance, skin/hair changes  Heme/allergy/immune:  NEGATIVE for bleeding problems  Psychiatric:  NEGATIVE for changes in mood or affect    Past Medical History:   Past Medical History:   Diagnosis Date     Actinic keratosis      Basal cell carcinoma 07/02/2008    left chest     BPH (benign prostatic hypertrophy) with urinary obstruction <2003     Eczema      PSC (posterior subcapsular cataract), left 5/10/2016     Skin cancer 11/8/2006    sccis L helix     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 No family hx of      Cancer No family hx of      Hypertension No family hx of      Cerebrovascular Disease No family hx of      Thyroid Disease No family hx of      Glaucoma No family hx of      Macular Degeneration No family hx of      Breast Cancer No family hx of      Prostate Cancer No family hx of      Depression No family hx of      Anxiety Disorder No family hx of      Mental Illness No family hx of      Substance Abuse No family hx of      Anesthesia Reaction No family hx of      Asthma No family hx of      Osteoporosis No family hx of      Genetic Disorder No family hx of      Obesity No family hx of      Unknown/Adopted No family hx of      Social History:   Social History     Tobacco Use     Smoking status: Never Smoker     Smokeless tobacco: Never Used     Tobacco comment: Lives in smoke free household   Substance Use Topics     Alcohol use: No     Alcohol/week: 0.0 standard drinks     This document serves as a record of the services and decisions personally performed and made by AYLA Gonzales MD. It was created on his behalf by Manuela Mcdonnell, a trained medical scribe. The creation of this document is based the provider's statements to the medical scribe.    Scribe Manuela Mcdonnell 11:25 AM 1/16/2020       OBJECTIVE:  Physical Exam:  BP (!) 147/90   Pulse 98   Ht 1.702 m (5' 7\")   Wt 70.3 kg (155 lb)   SpO2 97%   BMI 24.28 kg/m    General Appearance: healthy, alert and no distress   Skin: no suspicious lesions or rashes  Neuro: Normal strength and tone, mentation intact and speech normal  Vascular: good pulses, and cappillary refill   Lymph: no lymphadenopathy   Psych:  mentation appears normal and affect normal/bright  Resp: no increased work of breathing     Left Hip Exam:  Gait: Normal  Lumbar range of motion:    Flexion: to touch " mid shin   Side bending: does seem to cause some of his left hip pain  Seated SLR: Some pain   Supine SLR: Some pain      Tender: sciatic notch, without radiation.   Non tender: greater trochanter.   Hip range of motion:    Flexion: 105 degrees, with no flexion contracture   Internal Rotation: 15* degrees   External Rotation: 35* degrees   Abduction: 30* degrees  Strength: Good flexion and abduction strength.    X-rays:  Radiographs of the pelvis and left hip from 1/9/2020 were reviewed with the patient, and demonstrate: Progression of the degenerative changes, particularly in the left hip.    ASSESSMENT:   Encounter Diagnosis   Name Primary?     Primary osteoarthritis of left hip Yes     PLAN:   We discussed treatment options. We briefly discussed total hip arthroplasty down the future, but I recommended physical therapy for the meantime. Physical therapy was ordered.   In the near future, if he has more pain an image guided steroid injection could be ordered. He will contact us and we will order the injection for him.    If pain returns a long time from now, then coming in for a repeat xray prior to getting the injection would be preferred.    Return to clinic: AS NEEDED     The information in this document, created by a scribe for me, accurately reflects the services I personally performed and the decisions made by me. I have reviewed and approved this document for accuracy.     AYLA Gonzales MD  Dept. Orthopedic Surgery  [facility]

## 2020-01-16 NOTE — LETTER
1/16/2020         RE: Froy Loredo  [address] [address]        Dear Colleague,    Thank you for referring your patient, Froy Loredo, to the [facility]. Please see a copy of my visit note below.    SUBJECTIVE:   Froy Loredo is a 79 year old male who is seen in consultation at the request of Dr. Draper for evaluation of left hip pain that has been present on and off for the last 2 years. No known injury. A couple weeks ago, he had severe pain which was unusual, but it has since gotten better. When he gets the pain, it's laterally and deep. When it hurts, he reports it hurts to walk and while sitting. He denies night pain. No history of lower back problems.     Review of Systems:  Constitutional:  NEGATIVE for fever, chills, change in weight  Integumentary/Skin:  NEGATIVE for worrisome rashes, moles or lesions  Eyes:  NEGATIVE for vision changes or irritation  ENT/Mouth:  NEGATIVE for ear, mouth and throat problems  Resp:  NEGATIVE for significant cough or SOB  CV:  NEGATIVE for chest pain, palpitations or peripheral edema  GI:  NEGATIVE for nausea, abdominal pain, heartburn, or change in bowel habits  :  Negative   Musculoskeletal:  See HPI above  Neuro:  NEGATIVE for weakness, dizziness or paresthesias  Endocrine:  NEGATIVE for temperature intolerance, skin/hair changes  Heme/allergy/immune:  NEGATIVE for bleeding problems  Psychiatric:  NEGATIVE for changes in mood or affect    Past Medical History:   Past Medical History:   Diagnosis Date     Actinic keratosis      Basal cell carcinoma 07/02/2008    left chest     BPH (benign prostatic hypertrophy) with urinary obstruction <2003     Eczema      PSC (posterior subcapsular cataract), left 5/10/2016     Skin cancer 11/8/2006    sccis L helix     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 No family hx of      Cancer No family hx of      Hypertension No family hx of      Cerebrovascular Disease No family hx of      Thyroid Disease No family hx of      Glaucoma No family hx of      Macular Degeneration No family hx of      Breast Cancer No family hx of      Prostate Cancer No family hx of      Depression No family hx of      Anxiety Disorder No family hx of      Mental Illness No family hx of      Substance Abuse No family hx of      Anesthesia Reaction No family hx of      Asthma No family hx of      Osteoporosis No family hx of      Genetic Disorder No family hx of      Obesity No family hx of      Unknown/Adopted No family hx of      Social History:   Social History     Tobacco Use     Smoking status: Never Smoker     Smokeless tobacco: Never Used     Tobacco comment: Lives in smoke free household   Substance Use Topics     Alcohol use: No     Alcohol/week: 0.0 standard drinks     This document serves as a record of the services and decisions personally performed and made by AYLA Gonzales MD. It was created on his behalf by Manuela Mcdonnell, a trained medical scribe. The creation of this document is based the provider's statements to the medical scribe.    Scribe Manuela Mcdonnell 11:25 AM 1/16/2020       OBJECTIVE:  Physical Exam:  BP (!) 147/90   Pulse 98   Ht 1.702 m (5' 7\")   Wt 70.3 kg (155 lb)   SpO2 97%   BMI 24.28 kg/m     General Appearance: healthy, alert and no distress   Skin: no suspicious lesions or rashes  Neuro: Normal strength and tone, mentation intact and speech " normal  Vascular: good pulses, and cappillary refill   Lymph: no lymphadenopathy   Psych:  mentation appears normal and affect normal/bright  Resp: no increased work of breathing     Left Hip Exam:  Gait: Normal  Lumbar range of motion:    Flexion: to touch mid shin   Side bending: does seem to cause some of his left hip pain  Seated SLR: Some pain   Supine SLR: Some pain      Tender: sciatic notch, without radiation.   Non tender: greater trochanter.   Hip range of motion:    Flexion: 105 degrees, with no flexion contracture   Internal Rotation: 15* degrees   External Rotation: 35* degrees   Abduction: 30* degrees  Strength: Good flexion and abduction strength.    X-rays:  Radiographs of the pelvis and left hip from 1/9/2020 were reviewed with the patient, and demonstrate: Progression of the degenerative changes, particularly in the left hip.    ASSESSMENT:   Encounter Diagnosis   Name Primary?     Primary osteoarthritis of left hip Yes     PLAN:   We discussed treatment options. We briefly discussed total hip arthroplasty down the future, but I recommended physical therapy for the meantime. Physical therapy was ordered.   In the near future, if he has more pain an image guided steroid injection could be ordered. He will contact us and we will order the injection for him.    If pain returns a long time from now, then coming in for a repeat xray prior to getting the injection would be preferred.    Return to clinic: AS NEEDED     The information in this document, created by a scribe for me, accurately reflects the services I personally performed and the decisions made by me. I have reviewed and approved this document for accuracy.     AYLA Gonzales MD  Dept. Orthopedic Surgery  [facility]       Again, thank you for allowing me to participate in the care of your patient.        Sincerely,        Froy Gonzales MD

## 2020-01-21 NOTE — PROGRESS NOTES
"[facility] Initial Evaluation  Subjective:  The history is provided by the patient. No  was used.   Type of problem:  Left hip   Condition occurred with:  Insidious onset. This is a chronic condition   Problem details: Pt states has had pain off and on for about two years without specific incident.  Seemed worse a few weeks ago and had \"an ache.\"  Referred to PT 01/16/2020.  Pt states the past week or so has actually been pretty good and has not had pain.   Patient reports pain:  Posterior.   Exacerbated by: walking. Relieved by: \"no, not really\"    Froy Loredo being seen for hip.   Where condition occurred: for unknown reasons.Problem occurred: gradually  Pain score: 0/10 today but rated as 3/10 when was present. General health as reported by patient is good. Pertinent medical history includes:  None.  Medical allergies: N/A.   Other surgery history details: knee.  Current medications:  Anti-inflammatory.     Pain is described as aching and is intermittent. Pain timing: no particular pattern re: time of day. Since onset symptoms are rapidly improving. Imaging testing: see xray in chart.     Patient is retired.   Barriers include:  None as reported by patient.  Red flags:  None as reported by patient.     Pt states his goal is \"to feel like I feel now.\"                 Objective:    Gait:  Pt ambulates without device without gross asymmetry.        Flexibility/Screens:       Lower Extremity:  Decreased left lower extremity flexibility:Hamstrings    Decreased right lower extremity flexibility:  Hamstrings                                                 Hip Evaluation  Hip PROM:    Flexion: Left: 90 deg \"tight\"   Right: > 90 deg nevative  Extension: Left: 20 deg negative   Right: 20 deg negative  Abduction: Left: 30 deg \"tight\"    Right: 45 deg negative    Internal Rotation: Left: 15 deg negative    Right: 20 deg  External Rotation: Left: 30 deg negative    Right: 40 " deg              Hip Strength:    Flexion:   Left: 4/5   -  Pain:  Right: 4+/5   -  Pain:                    Extension:  Left: 4/5  -  Pain:Right: 4+/5    -  Pain:    Abduction:  Left: 4/5    -   Pain:Right: 4+/5   -   Pain:  Adduction:  Left: 4+/5   -   Pain:Right: 4+/5   -  Pain:      Knee Flexion:  Left: 4+/5   -  Pain:Right: 4+/5   -  Pain:  Knee Extension:  Left: 4+/5   -  Pain:Right: 4+/5    -  Pain:        Hip Special Testing:      Left hip negative for the following special tests:  Leeroy; Fadir/Labrum or SLR  Right hip negative for the following special tests:  Leeroy; Fadir/Labrum or SLR    Hip Palpation:  Normal                      An Lumbar Evaluation    Posture:  Sitting: fair  Standing: fair          Movement Loss:  Flexion (Flex): mod  Extension (EXT): mod  Side Glide R (SG R): min  Side [place] L (SG L): min                                               ROS    Assessment/Plan:    Patient is a 79 year old male with left side hip complaints.    Patient has the following significant findings with corresponding treatment plan.                Diagnosis 1:  L hip pain  Decreased ROM/flexibility - manual therapy and therapeutic exercise  Decreased strength - therapeutic exercise and therapeutic activities  Decreased function - therapeutic activities    Therapy Evaluation Codes:   1) History comprised of:   Personal factors that impact the plan of care:      Time since onset of symptoms.    Comorbidity factors that impact the plan of care are:      None.     Medications impacting care: Anti-inflammatory.  2) Examination of Body Systems comprised of:   Body structures and functions that impact the plan of care:      Hip.   Activity limitations that impact the plan of care are:      Walking.  3) Clinical presentation characteristics are:   Stable/Uncomplicated.  4) Decision-Making    Low complexity using standardized patient assessment instrument and/or measureable assessment of functional  outcome.  Cumulative Therapy Evaluation is: Low complexity.    Previous and current functional limitations:  (See Goal Flow Sheet for this information)    Short term and Long term goals: (See Goal Flow Sheet for this information)     Communication ability:  Patient appears to be able to clearly communicate and understand verbal and written communication and follow directions correctly.  Treatment Explanation - The following has been discussed with the patient:   RX ordered/plan of care  Anticipated outcomes  Possible risks and side effects  This patient would benefit from PT intervention to resume normal activities.   Rehab potential is good.    Frequency:  1 X week, once daily  Duration:  for 1 weeks  Discharge Plan:  Achieve all LTG.  Independent in home treatment program.  Reach maximal therapeutic benefit.  Given pt reports no current functional limitations or pain, he agrees discharge to [facility] but will let me know if there are further issues.    Please refer to the daily flowsheet for treatment today, total treatment time and time spent performing 1:1 timed codes.

## 2020-01-22 ENCOUNTER — THERAPY VISIT (OUTPATIENT)
Dept: PHYSICAL THERAPY | Facility: CLINIC | Age: 80
End: 2020-01-22
Attending: ORTHOPAEDIC SURGERY
Payer: COMMERCIAL

## 2020-01-22 DIAGNOSIS — M25.551 HIP PAIN, RIGHT: ICD-10-CM

## 2020-01-22 DIAGNOSIS — M16.12 PRIMARY OSTEOARTHRITIS OF LEFT HIP: ICD-10-CM

## 2020-01-22 PROCEDURE — 97110 THERAPEUTIC EXERCISES: CPT | Mod: GP | Performed by: PHYSICAL THERAPIST

## 2020-01-22 PROCEDURE — 97161 PT EVAL LOW COMPLEX 20 MIN: CPT | Mod: GP | Performed by: PHYSICAL THERAPIST

## 2020-01-22 ASSESSMENT — ACTIVITIES OF DAILY LIVING (ADL)
WALKING_UP_STEEP_HILLS: SLIGHT DIFFICULTY
HOS_ADL_COUNT: 17
HOW_WOULD_YOU_RATE_YOUR_CURRENT_LEVEL_OF_FUNCTION_DURING_YOUR_USUAL_ACTIVITIES_OF_DAILY_LIVING_FROM_0_TO_100_WITH_100_BEING_YOUR_LEVEL_OF_FUNCTION_PRIOR_TO_YOUR_HIP_PROBLEM_AND_0_BEING_THE_INABILITY_TO_PERFORM_ANY_OF_YOUR_USUAL_DAILY_ACTIVITIES?: 98
SITTING_FOR_15_MINUTES: NO DIFFICULTY AT ALL
PUTTING_ON_SOCKS_AND_SHOES: NO DIFFICULTY AT ALL
LIGHT_TO_MODERATE_WORK: SLIGHT DIFFICULTY
HEAVY_WORK: SLIGHT DIFFICULTY
HOS_ADL_HIGHEST_POTENTIAL_SCORE: 68
DEEP_SQUATTING: SLIGHT DIFFICULTY
STEPPING_UP_AND_DOWN_CURBS: NO DIFFICULTY AT ALL
HOS_ADL_ITEM_SCORE_TOTAL: 56
WALKING_DOWN_STEEP_HILLS: SLIGHT DIFFICULTY
WALKING_15_MINUTES_OR_GREATER: NO DIFFICULTY AT ALL
RECREATIONAL_ACTIVITIES: SLIGHT DIFFICULTY
ROLLING_OVER_IN_BED: SLIGHT DIFFICULTY
WALKING_INITIALLY: NO DIFFICULTY AT ALL
GETTING_INTO_AND_OUT_OF_AN_AVERAGE_CAR: SLIGHT DIFFICULTY
TWISTING/PIVOTING_ON_INVOLVED_LEG: SLIGHT DIFFICULTY
GOING_DOWN_1_FLIGHT_OF_STAIRS: SLIGHT DIFFICULTY
WALKING_APPROXIMATELY_10_MINUTES: NO DIFFICULTY AT ALL
HOS_ADL_SCORE(%): 82.35
STANDING_FOR_15_MINUTES: SLIGHT DIFFICULTY
GOING_UP_1_FLIGHT_OF_STAIRS: SLIGHT DIFFICULTY
GETTING_INTO_AND_OUT_OF_A_BATHTUB: NO DIFFICULTY AT ALL

## 2020-02-23 ENCOUNTER — HEALTH MAINTENANCE LETTER (OUTPATIENT)
Age: 80
End: 2020-02-23

## 2020-03-13 ENCOUNTER — OFFICE VISIT (OUTPATIENT)
Dept: FAMILY MEDICINE | Facility: CLINIC | Age: 80
End: 2020-03-13
Payer: COMMERCIAL

## 2020-03-13 VITALS
TEMPERATURE: 98.3 F | SYSTOLIC BLOOD PRESSURE: 131 MMHG | OXYGEN SATURATION: 96 % | HEART RATE: 106 BPM | DIASTOLIC BLOOD PRESSURE: 76 MMHG | WEIGHT: 155 LBS | BODY MASS INDEX: 24.28 KG/M2

## 2020-03-13 DIAGNOSIS — S46.912A STRAIN OF LEFT SHOULDER, INITIAL ENCOUNTER: Primary | ICD-10-CM

## 2020-03-13 PROCEDURE — 99213 OFFICE O/P EST LOW 20 MIN: CPT | Performed by: PHYSICIAN ASSISTANT

## 2020-03-13 RX ORDER — LIDOCAINE 50 MG/G
OINTMENT TOPICAL 2 TIMES DAILY
Qty: 150 G | Refills: 0 | Status: SHIPPED | OUTPATIENT
Start: 2020-03-13 | End: 2022-09-14

## 2020-03-13 RX ORDER — CYCLOBENZAPRINE HCL 10 MG
10 TABLET ORAL 3 TIMES DAILY PRN
Qty: 21 TABLET | Refills: 0 | Status: SHIPPED | OUTPATIENT
Start: 2020-03-13 | End: 2020-03-20

## 2020-03-13 NOTE — PATIENT INSTRUCTIONS
Patient Education     Muscle Strain in the Extremities  A muscle strain is a stretching and tearing of muscle fibers. This causes pain, especially when you move that muscle. There may also be some swelling and bruising.  Home care    Keep the hurt area raised above heart level to reduce pain and swelling. This is especially important during the first 48 hours.    Apply an ice pack over the injured area for 15 to 20 minutes every 3 to 6 hours. You should do this for the first 24 to 48 hours. You can make an ice pack by filling a plastic bag that seals at the top with ice cubes and then wrapping it with a thin towel. Be careful not to injure your skin with the ice treatments. Ice should never be applied directly to skin. Continue the use of ice packs for relief of pain and swelling as needed. After 48 hours, apply heat (warm shower or warm bath) for 15 to 20 minutes several times a day, or alternate ice and heat.    You may use over-the-counter pain medicine to control pain, unless another medicine was prescribed. If you have chronic liver or kidney disease or ever had a stomach ulcer or gastrointestinal bleeding, talk with your healthcare provider before using these medicines.    For leg strains: If crutches have been recommended, don t put full weight on the hurt leg until you can do so without pain. You can return to sports when you are able to hop and run on the injured leg without pain.  Follow-up care  Follow up with your healthcare provider, or as advised.  When to seek medical advice  Call your healthcare provider right away if any of these occur:    The toes of the injured leg become swollen, cold, blue, numb, or tingly    Pain or swelling increases  Date Last Reviewed: 5/1/2018 2000-2019 [employer]. [address] [place] [id number]. All rights reserved. This information is not intended as a substitute for professional medical care. Always follow your healthcare professional's  instructions.

## 2020-03-16 ENCOUNTER — NURSE TRIAGE (OUTPATIENT)
Dept: FAMILY MEDICINE | Facility: CLINIC | Age: 80
End: 2020-03-16

## 2020-03-16 NOTE — TELEPHONE ENCOUNTER
Patient is calling to speak with provider isn't able to do an oncare is having trouble urinating would like to be seen asap     Please call to help schedule this appointment   Thank you

## 2020-03-16 NOTE — TELEPHONE ENCOUNTER
"  Reason for Disposition    [1] Unable to urinate (or only a few drops) > 4 hours AND     [2] bladder feels very full (e.g., palpable bladder or strong urge to urinate)    Additional Information    Negative: Shock suspected (e.g., cold/pale/clammy skin, too weak to stand, low BP, rapid pulse)    Negative: Sounds like a life-threatening emergency to the triager    Answer Assessment - Initial Assessment Questions  1. SYMPTOM: \"What's the main symptom you're concerned about?\" (e.g., frequency, incontinence)      Patient unable to urinate  2. ONSET: \"When did the unable to urinate  start?\"      During the night last night  3. PAIN: \"Is there any pain?\" If so, ask: \"How bad is it?\" (Scale: 1-10; mild, moderate, severe)      No pain  4. CAUSE: \"What do you think is causing the symptoms?\"      unknown  5. OTHER SYMPTOMS: \"Do you have any other symptoms?\" (e.g., fever, flank pain, blood in urine, pain with urination)      No other symptoms  6. PREGNANCY: \"Is there any chance you are pregnant?\" \"When was your last menstrual period?\"      N/A    Protocols used: URINARY SYMPTOMS-A-AH    "

## 2020-03-16 NOTE — TELEPHONE ENCOUNTER
Patient states cannot urinate. No pain or burning.   Onset during the night last night  Last time patient did urinate was last night about 5pm. No pain, normal urination at that time  No fever or chills or cough    Patient was in [place] 2-3 weeks ago.

## 2020-03-17 ENCOUNTER — VIRTUAL VISIT (OUTPATIENT)
Dept: UROLOGY | Facility: CLINIC | Age: 80
End: 2020-03-17
Payer: COMMERCIAL

## 2020-03-17 ENCOUNTER — TELEPHONE (OUTPATIENT)
Dept: UROLOGY | Facility: CLINIC | Age: 80
End: 2020-03-17

## 2020-03-17 DIAGNOSIS — R33.8 BENIGN PROSTATIC HYPERPLASIA WITH URINARY RETENTION: Primary | ICD-10-CM

## 2020-03-17 DIAGNOSIS — R31.29 MICROSCOPIC HEMATURIA: ICD-10-CM

## 2020-03-17 DIAGNOSIS — N40.1 BENIGN PROSTATIC HYPERPLASIA WITH URINARY RETENTION: Primary | ICD-10-CM

## 2020-03-17 PROCEDURE — 99202 OFFICE O/P NEW SF 15 MIN: CPT | Mod: TEL | Performed by: UROLOGY

## 2020-03-17 NOTE — PROGRESS NOTES
"Froy Loredo is a 79 year old male who is being evaluated via a billable telephone visit.      The patient has been notified of following:     \"This telephone visit will be conducted via a call between you and your physician/provider. We have found that certain health care needs can be provided without the need for a physical exam.  This service lets us provide the care you need with a short phone conversation.  If a prescription is necessary we can send it directly to your pharmacy.  If lab work is needed we can place an order for that and you can then stop by our lab to have the test done at a later time.    If during the course of the call the physician/provider feels a telephone visit is not appropriate, you will not be charged for this service.\"       Froy Loredo complains of    Chief Complaint   Patient presents with     Telephone       I have reviewed and updated the patient's Past Medical History, Social History, Family History and Medication List.    ALLERGIES  Doxazosin    Comfort Chin RN     Additional provider notes: Patient is an 79-year-old male who is seen emergency room yesterday because of tension.  He is currently on Flomax and Proscar.  He has been on these medications for several years.  He could not urinate at all for 24-hour.  Naranjo cath was placed yesterday but I was unable to obtain the amount of urine drained from his bladder.  He is doing well without any complaints.  He was seen by Dr. Bruno a number of years ago.  Laser TURP were discussed at that time however patient never had anything done.  He has no fever or gross hematuria.  Catheter has been draining well.  Assessment/Plan:  (N40.1,  R33.8) Benign prostatic hyperplasia with urinary retention  (primary encounter diagnosis)  Comment:    Plan: I recommend patient to continue with Naranjo catheter for at least a week.  Patient will come back to the office for cystoscopy and trial void.  If he is unable to urinate, he might need " to have TURP done to take care of this problem.    (R31.29) Microscopic hematuria  Comment: Chronic  Plan: Cystoscopy next.    I have reviewed the note as documented above.  This accurately captures the substance of my conversation with the patient.       Phone call contact time  Call Started at 1130  Call Ended at 1145    Pan Ngo MD

## 2020-03-24 ENCOUNTER — OFFICE VISIT (OUTPATIENT)
Dept: UROLOGY | Facility: CLINIC | Age: 80
End: 2020-03-24
Payer: COMMERCIAL

## 2020-03-24 DIAGNOSIS — R33.8 BENIGN PROSTATIC HYPERPLASIA WITH URINARY RETENTION: Primary | ICD-10-CM

## 2020-03-24 DIAGNOSIS — N40.1 BENIGN PROSTATIC HYPERPLASIA WITH URINARY RETENTION: Primary | ICD-10-CM

## 2020-03-24 PROCEDURE — 99204 OFFICE O/P NEW MOD 45 MIN: CPT | Mod: 25 | Performed by: UROLOGY

## 2020-03-24 PROCEDURE — 52000 CYSTOURETHROSCOPY: CPT | Performed by: UROLOGY

## 2020-03-24 NOTE — PATIENT INSTRUCTIONS
Please call our office if you have questions or need to report any information, [phone].    Patient Education     Self-Catheterization for Men  Self-catheterization helps you empty your bladder if it doesn t empty by itself. It also helps if your bladder doesn t empty all the way. Follow the steps below.  This is what you ll need    Soap and warm water, or a moist towelette    Clean catheter (your provider will tell you the correct type and size)    Water-soluble lubricating jelly (not petroleum jelly)    Toilet or basin      Lubricate catheter       Insert catheter       Empty urine      Step 1. Preparation    Gather all equipment needed.    Arrange clothing so it is out of the way.    Wash your hands and your penis. Use warm soapy water or a moist towelette to clean the end of the penis.  Step 2. Lubricate the catheter    Lubricate 2 to 4 inches of the catheter tip. Place the other end in the toilet or basin. Use a measuring urinal if your provider wants you to measure your urine. Some catheters contain self-lubricating solution inside the package.  Step 3. Insert the catheter    Grasp the tip of your penis. Hold it horizontally from your body.    Slowly insert the catheter into your urethra about 6 to 8 inches or until urine beings to flow. The urethra is the tube that drains urine from the bladder out to the end of the penis. If it doesn t go in, take a deep breath and bear down as if trying to urinate. If you feel a sharp pain, remove the catheter and try again.  Step 4. Empty urine    When the urine starts flowing, advance the catheter tip an additional inch. Lower the angle of your penis.    When the urine stops flowing, slowly remove the catheter.  Step 5. Wash the catheter    Wash the catheter in mild soap and water.    Rinse it well. Run water through it. Then let it air-dry. Certain catheters are single use and can be thrown away after use.    Wash your hands with soap and water. If you use a basin,  wash it out.  Date Last Reviewed: 12/1/2016 2000-2019 [employer]. [address], [place] [address]. All rights reserved. This information is not intended as a substitute for professional medical care. Always follow your healthcare professional's instructions.

## 2020-03-24 NOTE — PROGRESS NOTES
S: Froy Loredo is a pleasant  79 year old male who was requested to be seen by  Bj Hull for a consult with regard to patient's urinary complaints.  Patient complains of urinary retention.  He was seen in the ER and had lee placed recently.  He is on flomax/proscar.  He has no history of elevated PSA.  LUT symptoms have been on going for   many years(s).  Seems to be worsened over time.  His recent PSA was found to be   PSA   Date Value Ref Range Status   08/02/2017 1.60 0 - 4 ug/L Final     Comment:     Assay Method:  Chemiluminescence using Siemens Vista analyzer   He denies any dysuria or hematuria.    Current Outpatient Medications   Medication Sig Dispense Refill     finasteride (PROSCAR) 5 MG tablet Take 1 tablet (5 mg) by mouth daily 90 tablet 1     fluocinolone acetonide 0.01 % OIL Use 4-5 drops in right ear 1-2 times daily as needed for itching. 20 mL 1     lidocaine (XYLOCAINE) 5 % external ointment Apply topically 2 times daily 150 g 0     MULTI-VITAMIN OR  1 tablet DAILY       polyethylene glycol 0.4%- propylene glycol 0.3% (SYSTANE ULTRA) 0.4-0.3 % SOLN ophthalmic solution Place 1 drop into both eyes 2 times daily (Patient not taking: Reported on 3/17/2020)       sulindac (CLINORIL) 200 MG tablet Take 1 tablet (200 mg) by mouth 2 times daily 60 tablet 11     tamsulosin (FLOMAX) 0.4 MG capsule TAKE 2 CAPSULES BY MOUTH EVERY DAY 60 capsule 0     VITAMIN C OR 2 TABLETS DAILY       Allergies   Allergen Reactions     Doxazosin      Lightheadedness 2013, resolved with switch to tamsulosin      Past Medical History:   Diagnosis Date     Actinic keratosis      Basal cell carcinoma 07/02/2008    left chest     BPH (benign prostatic hypertrophy) with urinary obstruction <2003     Eczema      PSC (posterior subcapsular cataract), left 5/10/2016     Skin cancer 11/8/2006    sccis L helix     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 No family hx of      Cancer No family hx of      Hypertension No family hx of      Cerebrovascular Disease No family hx of      Thyroid Disease No family hx of      Glaucoma No family hx of      Macular Degeneration No family hx of      Breast Cancer No family hx of      Prostate Cancer No family hx of      Depression No family hx of      Anxiety Disorder No family hx of      Mental Illness No family hx of      Substance Abuse No family hx of      Anesthesia Reaction No family hx of      Asthma No family hx of      Osteoporosis No family hx of      Genetic Disorder No family hx of      Obesity No family hx of      Unknown/Adopted No family hx of      He does not have a family history of prostate cancer.  Social History     Socioeconomic History     Marital status:      Spouse name: Radha     Number of children: 2     Years of education: None     Highest education level: None   Occupational History     Occupation: Real estate appraisal     Employer: RETIRED     Comment: [place] [employer]     Employer: RETIRED   Social Needs     Financial resource strain: None     Food insecurity     Worry: None     Inability: None     Transportation needs     Medical: None     Non-medical: None   Tobacco Use     Smoking status: Never Smoker     Smokeless tobacco: Never Used     Tobacco comment: Lives in smoke free household   Substance and Sexual Activity     Alcohol use: No     Alcohol/week: 0.0 standard drinks     Drug use: No     Sexual activity: Yes     Partners: Female     Birth control/protection: None   Lifestyle      Physical activity     Days per week: None     Minutes per session: None     Stress: None   Relationships     Social connections     Talks on phone: None     Gets together: None     Attends Gnosticism service: None     Active member of club or organization: None     Attends meetings of clubs or organizations: None     Relationship status: None     Intimate partner violence     Fear of current or ex partner: None     Emotionally abused: None     Physically abused: None     Forced sexual activity: None   Other Topics Concern     Parent/sibling w/ CABG, MI or angioplasty before 65F 55M? No      Service Yes     Blood Transfusions No     Caffeine Concern No     Occupational Exposure No     Hobby Hazards No     Sleep Concern Yes     Comment: [employer]azanaly     Stress Concern No     Weight Concern No     Special Diet No     Back Care No     Exercise Yes     Bike Helmet No     Seat Belt Yes     Self-Exams No   Social History Narrative     None        REVIEW OF SYSTEMS  =================  C: NEGATIVE for fever, chills, change in weight  I: NEGATIVE for worrisome rashes, moles or lesions  E/M: NEGATIVE for ear, mouth and throat problems  R: NEGATIVE for significant cough or SHORTNESS OF BREATH  CV:  NEGATIVE for chest pain, palpitations or peripheral edema  GI: NEGATIVE for nausea, abdominal pain, heartburn, or change in bowel habits  NEURO: NEGATIVE numbness/weakness  : see HPI  PSYCH: NEGATIVE depression/anxiety  LYmph: no new enlarged lymph nodes  Ortho: no new trauma/movements           O: Exam:There were no vitals taken for this visit.   Constitutional: healthy, alert and no distress  Cardiovascular: negative, PMI normal.   Respiratory: negative, no evidence of respiratory distress  Gastrointestinal: Abdomen soft, non-tender. BS normal. No masses, organomegaly  : penis no discharge. Testis no masses.  No scrotal skin lesion.  Naranjo in placed.  Musculoskeletal: extremities normal- no gross deformities noted, gait  normal and normal muscle tone  Skin: no suspicious lesions or rashes  Neurologic: Alert and oriented  Psychiatric: mentation appears normal. and affect normal/bright  Hematologic/Lymphatic/Immunologic: normal ant/post cervical, axillary, supraclavicular and inguinal nodes    Assessment/Plan:   (N40.1,  R33.8) Benign prostatic hyperplasia with urinary retention  (primary encounter diagnosis)  Comment: 250 ml placed and patient voided 150 ml only    Cysto done today.    Patient is draped and prepped.  Flexible cystoscopy placed under direct vision.      The anterior urethra is normal   The prostatic urethra showed trilobar enlargement.     The length is 3cm,  the coaptation is 3 cm.     In the bladder there is trabeculation grade 2.    Assessment/Plan:  (N40.1,  R33.8) Benign prostatic hyperplasia with urinary retention  (primary encounter diagnosis)  Comment:    Plan: patient instructed to perform self cath prn           Cont with flomax/proscar           Info on TURP provided.           See me in a couple of months for surgery.

## 2020-03-25 ENCOUNTER — TELEPHONE (OUTPATIENT)
Dept: UROLOGY | Facility: CLINIC | Age: 80
End: 2020-03-25

## 2020-03-25 NOTE — TELEPHONE ENCOUNTER
Called and spoke to patient and wife.   Patient was seen in clinic this week and had a cystoscopy.   Patient was taught how to self cath.   Patient just wanted to clarify plan of care.   patient instructed to perform self cath prn           Cont with flomax/proscar           Info on TURP provided.           See me in a couple of months for surgery per Dr. Ngo.   Patient verbalized understanding.   Comfort Chin RN

## 2020-03-25 NOTE — TELEPHONE ENCOUNTER
Reason for call:  Other   Patient called regarding (reason for call): call back  Additional comments: Patients wife is calling because her  was sent home with some tubes yesterday and she says he is having a hard time urinating. Please call back     Phone number to reach patient:  Other phone number:  [phone]    Best Time:  any    Can we leave a detailed message on this number?  YES    Travel screening: Not Applicable

## 2020-11-27 ENCOUNTER — VIRTUAL VISIT (OUTPATIENT)
Dept: FAMILY MEDICINE | Facility: CLINIC | Age: 80
End: 2020-11-27
Payer: COMMERCIAL

## 2020-11-27 DIAGNOSIS — R06.00 DYSPNEA, UNSPECIFIED TYPE: Primary | ICD-10-CM

## 2020-11-27 PROCEDURE — 99207 PR NO CHARGE LOS: CPT | Performed by: NURSE PRACTITIONER

## 2020-11-27 NOTE — PROGRESS NOTES
"Froy Loredo is a 80 year old male who is being evaluated via a billable telephone visit.      The patient has been notified of following:     \"This telephone visit will be conducted via a call between you and your physician/provider. We have found that certain health care needs can be provided without the need for a physical exam.  This service lets us provide the care you need with a short phone conversation.  If a prescription is necessary we can send it directly to your pharmacy.  If lab work is needed we can place an order for that and you can then stop by our lab to have the test done at a later time.    Telephone visits are billed at different rates depending on your insurance coverage. During this emergency period, for some insurers they may be billed the same as an in-person visit.  Please reach out to your insurance provider with any questions.    If during the course of the call the physician/provider feels a telephone visit is not appropriate, you will not be charged for this service.\"    Patient has given verbal consent for Telephone visit?  Yes    What phone number would you like to be contacted at? [phone]    How would you like to obtain your AVS? [place]    Subjective     Froy Loredo is a 80 year old male who presents via phone visit today for the following health issues:    HPI     Concern - shortness of breath  Onset: off and on for one year  Description: shortness of breath off and on x one year. No known trigger  Progression of Symptoms:  same  Accompanying Signs & Symptoms: none  Previous history of similar problem: none  Therapies tried and outcome:  none     Patient notes fatigue and intermittent dyspnea for the past year.  Symptoms come with rest and with activity.  He denies chest pain, palpitations, lower extremity edema, orthopnea.  Patient denies melena, hematochezia.  Patient is able to do his normal activities without issues.      Review of Systems   Constitutional, HEENT, " cardiovascular, pulmonary, gi and gu systems are negative, except as otherwise noted.       Objective          Vitals:  No vitals were obtained today due to virtual visit.    healthy, alert and no distress  PSYCH: Alert and oriented times 3; coherent speech, normal   rate and volume, able to articulate logical thoughts, able   to abstract reason, no tangential thoughts, no hallucinations   or delusions  His affect is normal  RESP: No cough, no audible wheezing, able to talk in full sentences  Remainder of exam unable to be completed due to telephone visits            Assessment/Plan:    Assessment & Plan     Dyspnea, unspecified type  Patient advised to be seen in person for further evaluation.              Return for Schedule in person visit to evaluate further..    CHE [facility]    Phone call duration:  4 minutes

## 2020-12-03 ENCOUNTER — OFFICE VISIT (OUTPATIENT)
Dept: FAMILY MEDICINE | Facility: CLINIC | Age: 80
End: 2020-12-03
Payer: COMMERCIAL

## 2020-12-03 VITALS
OXYGEN SATURATION: 98 % | DIASTOLIC BLOOD PRESSURE: 77 MMHG | TEMPERATURE: 98.3 F | BODY MASS INDEX: 24.12 KG/M2 | SYSTOLIC BLOOD PRESSURE: 131 MMHG | HEART RATE: 97 BPM | WEIGHT: 154 LBS

## 2020-12-03 DIAGNOSIS — R06.02 SOB (SHORTNESS OF BREATH): Primary | ICD-10-CM

## 2020-12-03 DIAGNOSIS — R53.83 OTHER FATIGUE: ICD-10-CM

## 2020-12-03 DIAGNOSIS — E55.9 VITAMIN D DEFICIENCY: ICD-10-CM

## 2020-12-03 DIAGNOSIS — R09.81 NASAL CONGESTION: ICD-10-CM

## 2020-12-03 LAB
ALBUMIN SERPL-MCNC: 3.8 G/DL (ref 3.4–5)
ALP SERPL-CCNC: 74 U/L (ref 40–150)
ALT SERPL W P-5'-P-CCNC: 25 U/L (ref 0–70)
ANION GAP SERPL CALCULATED.3IONS-SCNC: 3 MMOL/L (ref 3–14)
AST SERPL W P-5'-P-CCNC: 17 U/L (ref 0–45)
BILIRUB SERPL-MCNC: 0.8 MG/DL (ref 0.2–1.3)
BUN SERPL-MCNC: 28 MG/DL (ref 7–30)
CALCIUM SERPL-MCNC: 9 MG/DL (ref 8.5–10.1)
CHLORIDE SERPL-SCNC: 109 MMOL/L (ref 94–109)
CO2 SERPL-SCNC: 30 MMOL/L (ref 20–32)
CREAT SERPL-MCNC: 1.13 MG/DL (ref 0.66–1.25)
ERYTHROCYTE [DISTWIDTH] IN BLOOD BY AUTOMATED COUNT: 13.5 % (ref 10–15)
GFR SERPL CREATININE-BSD FRML MDRD: 60 ML/MIN/{1.73_M2}
GLUCOSE SERPL-MCNC: 89 MG/DL (ref 70–99)
HCT VFR BLD AUTO: 51.6 % (ref 40–53)
HGB BLD-MCNC: 17 G/DL (ref 13.3–17.7)
MCH RBC QN AUTO: 31.1 PG (ref 26.5–33)
MCHC RBC AUTO-ENTMCNC: 32.9 G/DL (ref 31.5–36.5)
MCV RBC AUTO: 95 FL (ref 78–100)
PLATELET # BLD AUTO: 178 10E9/L (ref 150–450)
POTASSIUM SERPL-SCNC: 4.1 MMOL/L (ref 3.4–5.3)
PROT SERPL-MCNC: 7.3 G/DL (ref 6.8–8.8)
RBC # BLD AUTO: 5.46 10E12/L (ref 4.4–5.9)
SODIUM SERPL-SCNC: 142 MMOL/L (ref 133–144)
TSH SERPL DL<=0.005 MIU/L-ACNC: 1.86 MU/L (ref 0.4–4)
WBC # BLD AUTO: 8.4 10E9/L (ref 4–11)

## 2020-12-03 PROCEDURE — 85027 COMPLETE CBC AUTOMATED: CPT | Performed by: FAMILY MEDICINE

## 2020-12-03 PROCEDURE — 84443 ASSAY THYROID STIM HORMONE: CPT | Performed by: FAMILY MEDICINE

## 2020-12-03 PROCEDURE — 80053 COMPREHEN METABOLIC PANEL: CPT | Performed by: FAMILY MEDICINE

## 2020-12-03 PROCEDURE — 36415 COLL VENOUS BLD VENIPUNCTURE: CPT | Performed by: FAMILY MEDICINE

## 2020-12-03 PROCEDURE — 99214 OFFICE O/P EST MOD 30 MIN: CPT | Performed by: FAMILY MEDICINE

## 2020-12-03 PROCEDURE — 82306 VITAMIN D 25 HYDROXY: CPT | Performed by: FAMILY MEDICINE

## 2020-12-03 RX ORDER — FLUTICASONE PROPIONATE 50 MCG
1 SPRAY, SUSPENSION (ML) NASAL DAILY
Qty: 16 G | Refills: 1 | Status: SHIPPED | OUTPATIENT
Start: 2020-12-03 | End: 2022-09-14

## 2020-12-03 NOTE — PROGRESS NOTES
SUBJECTIVE:  Froy Loredo, a 80 year old male scheduled an appointment to discuss the following issues:  Shortness of breath x1 year on/off. Normal chest xray 2 years ago and normal stress echo 3 years ago.   Non-smoker. No history asthma. No MDI. No ALLERGIC RHINITIS. No sneezing but some post-nasal drainage. No major snoring issues or yoni. No gerd. No black/bloody stools. No urine changes acutely.  History enlarged prostate seen urology.   Exercise walking regularly 1-2 miles. Cold air worse. No pets. No water damage to house. No chemical exposure. No palpitations or LIGHTHEADED.  Fatigued overall past couple years -not a lot worse.  No chest pain.  No nasal sprays. No weight lifting. No mvi. Balanced diet - fruits/veggies/meat. Some milk.  Medical, social, surgical, and family histories reviewed.    ROS:  All other ROS negative  OBJECTIVE:  /77   Pulse 97   Temp 98.3  F (36.8  C) (Tympanic)   Wt 69.9 kg (154 lb)   SpO2 98%   BMI 24.12 kg/m    EXAM:  GENERAL APPEARANCE: healthy, alert and no distress  EYES: EOMI,  PERRL  HENT: ear canals and TM's normal and nose clear discharge  and mouth without ulcers or lesions  NECK: no adenopathy, no asymmetry, masses, or scars and thyroid normal to palpation  RESP: lungs clear to auscultation - no rales, rhonchi or wheezes  CV: regular rates and rhythm, normal S1 S2, no S3 or S4 and no murmur, click or rub -  ABDOMEN:  soft, nontender, no HSM or masses and bowel sounds normal  MS: extremities normal- no gross deformities noted, no evidence of inflammation in joints, FROM in all extremities.  SKIN: no suspicious lesions or rashes  NEURO: Normal strength and tone, sensory exam grossly normal, mentation intact and speech normal  PSYCH: mentation appears normal and affect normal/bright    ASSESSMENT / PLAN:  (R06.02) SOB (shortness of breath)  (primary encounter diagnosis)  Comment: unclear etiology. Nasal congestion. Mild asthma vs ?  Plan: CBC with platelets,  Comprehensive metabolic         panel (BMP + Alb, Alk Phos, ALT, AST, Total.         Bili, TP), TSH with free T4 reflex,         ALLERGY/ASTHMA ADULT REFERRAL        Chronic issues but to ER if acutely worsening or chest pain. Consider repeat cardiac testing. Can see allergist for second opinion if normal labs. Expected course and warning signs reviewed. Call/email with questions/concerns     (R53.83) Other fatigue  Comment: unclear etiology  Plan: CBC with platelets, Comprehensive metabolic         panel (BMP + Alb, Alk Phos, ALT, AST, Total.         Bili, TP), TSH with free T4 reflex        Check labs. Start mvi daily. Consider sleep study? Wife to monitor sleeping. Follow-up pmd in winter. Continue exercise. ?psych    (E55.9) Vitamin D deficiency  Comment: not taking a supplement  Plan: Vitamin D Deficiency        Start mvi and milk. Await labs.       (R09.81) Nasal congestion  Comment: ? ALLERGIC RHINITIS  Plan: fluticasone (FLONASE) 50 MCG/ACT nasal spray        Reveiwed risks and side effects of medication  Allergist if not improving.    Gallito Gilmore MD

## 2020-12-04 LAB — DEPRECATED CALCIDIOL+CALCIFEROL SERPL-MC: 47 UG/L (ref 20–75)

## 2020-12-18 ENCOUNTER — ANCILLARY PROCEDURE (OUTPATIENT)
Dept: GENERAL RADIOLOGY | Facility: CLINIC | Age: 80
End: 2020-12-18
Attending: ALLERGY & IMMUNOLOGY
Payer: COMMERCIAL

## 2020-12-18 ENCOUNTER — OFFICE VISIT (OUTPATIENT)
Dept: ALLERGY | Facility: CLINIC | Age: 80
End: 2020-12-18
Attending: FAMILY MEDICINE
Payer: COMMERCIAL

## 2020-12-18 VITALS
SYSTOLIC BLOOD PRESSURE: 120 MMHG | HEIGHT: 67 IN | OXYGEN SATURATION: 99 % | BODY MASS INDEX: 23.7 KG/M2 | HEART RATE: 66 BPM | WEIGHT: 151 LBS | DIASTOLIC BLOOD PRESSURE: 79 MMHG

## 2020-12-18 DIAGNOSIS — R09.82 POST-NASAL DRIP: ICD-10-CM

## 2020-12-18 DIAGNOSIS — J30.89 ALLERGIC RHINITIS DUE TO DUST MITE: ICD-10-CM

## 2020-12-18 DIAGNOSIS — J30.89 ALLERGIC RHINITIS DUE TO MOLD: ICD-10-CM

## 2020-12-18 DIAGNOSIS — R06.02 SHORTNESS OF BREATH: Primary | ICD-10-CM

## 2020-12-18 DIAGNOSIS — R06.02 SHORTNESS OF BREATH: ICD-10-CM

## 2020-12-18 DIAGNOSIS — J30.1 SEASONAL ALLERGIC RHINITIS DUE TO POLLEN: ICD-10-CM

## 2020-12-18 PROCEDURE — 71046 X-RAY EXAM CHEST 2 VIEWS: CPT | Performed by: RADIOLOGY

## 2020-12-18 PROCEDURE — 99204 OFFICE O/P NEW MOD 45 MIN: CPT | Mod: 25 | Performed by: ALLERGY & IMMUNOLOGY

## 2020-12-18 PROCEDURE — 95004 PERQ TESTS W/ALRGNC XTRCS: CPT | Performed by: ALLERGY & IMMUNOLOGY

## 2020-12-18 RX ORDER — ALBUTEROL SULFATE 90 UG/1
2 AEROSOL, METERED RESPIRATORY (INHALATION) EVERY 4 HOURS PRN
Qty: 2 INHALER | Refills: 3 | Status: SHIPPED | OUTPATIENT
Start: 2020-12-18

## 2020-12-18 ASSESSMENT — MIFFLIN-ST. JEOR: SCORE: 1343.56

## 2020-12-18 NOTE — ASSESSMENT & PLAN NOTE
Perennial postnasal drainage and throat clearing.    Skin testing:  Positive testing for molds, dust mites and trees.     -Allergen avoidance measures discussed and literature provided.   -Use Flonase 1 to 2 sprays per nostril daily.  Use consistently.

## 2020-12-18 NOTE — PROGRESS NOTES
Froy Loredo is a 80 year old White male with previous medical history significant for BPH and skin cancer. Froy Loredo is being seen today for evaluation of shortness of breath and post nasal drip. The patient is being seen in consultation at the request of Dr. Mando MD.     Patient reports that for over 1 year he has had perennial and persistent shortness of breath.  He has had associated tightness in chest.  Denies coughing.  Denies wheezing.  Not exertional.  Predominantly present throughout the day.  Not worse in the morning.  Not seasonal.  Not positional.  Not coughing up any sputum.  He had an stress echocardiogram in 2018 that was normal.  No complete PFT.  Chest x-ray was done in 2018 and normal.  Denies history of GERD.  No prior history of asthma.  Non-smoker.  No clear triggers of shortness of breath.  He does have persistent drainage in the back of his throat.  He has persistent throat clearing.  Prescribed Flonase but only used once or twice.  No use of oral antihistamine.  No history of allergy testing.  Postnasal drainage and throat clearing have been present for a few years.  Denies colored mucus.  No clear triggers of symptoms.  The symptoms are perennial without seasonal worsening.    ENVIRONMENTAL HISTORY: [place] The home is heated with a forced air. They do have central air conditioning. The patient's bedroom is furnished with feather/wool bedding or pillows and carpeting in bedroom.  Pets inside the house include 0 pets. There is no history of cockroach or mice infestation. There is/are 0 smokers in the house.  The house does not have a damp basement.     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 No family hx of      Cancer No family hx of      Hypertension No family hx of      Cerebrovascular Disease No family hx of      Thyroid Disease No family hx of      Glaucoma No family hx of      Macular Degeneration No family hx of      Breast Cancer No family hx of      Prostate Cancer No family hx of      Depression No family hx of      Anxiety Disorder No family hx of      Mental Illness No family hx of      Substance Abuse No family hx of      Anesthesia Reaction No family hx of      Asthma No family hx of      Osteoporosis No family hx of      Genetic Disorder No family hx of      Obesity No family hx of      Unknown/Adopted No family hx of      Past Surgical History:   Procedure Laterality Date     BIOPSY       COLONOSCOPY       HC MOHS ANY STAGE EA ADDTL BLOCK AFTER FIRST 5       ORTHOPEDIC SURGERY       ZZC NONSPECIFIC PROCEDURE  5/25/94    quadriceps tear-right     ZZC NONSPECIFIC PROCEDURE      bcc excision left chest       REVIEW OF SYSTEMS:  General: negative for weight gain. negative for weight loss. negative for changes in sleep.   Ears: negative for fullness. negative for hearing loss. negative for dizziness.   Nose: negative for snoring.negative for changes in smell. negative for drainage.   Eyes: negative for eye watering. negative for eye itching. negative for vision changes. negative for eye redness.  Throat: negative for hoarseness. negative for sore throat. negative for trouble swallowing.   Lungs: positive  for shortness of breath.negative for wheezing. negative for sputum production.   Cardiovascular: negative for chest pain. negative for swelling of ankles. negative for fast or irregular heartbeat.   Gastrointestinal: negative for nausea.  negative for heartburn. negative for acid reflux.   Musculoskeletal: negative for joint pain. negative for joint stiffness. negative for joint swelling.   Neurologic: negative for seizures. negative for fainting. negative for weakness.   Psychiatric: negative for changes in mood. negative for anxiety.   Endocrine: negative for cold intolerance. negative for heat intolerance. negative for tremors.   Lymphatic: negative for lower extremity swelling. negative for lymph node swelling.   Hematologic: negative for easy bruising. negative for easy bleeding.  Integumentary: negative for rash. negative for scaling. negative for nail changes.       Current Outpatient Medications:      albuterol (PROAIR HFA/PROVENTIL HFA/VENTOLIN HFA) 108 (90 Base) MCG/ACT inhaler, Inhale 2 puffs into the lungs every 4 hours as needed for shortness of breath / dyspnea or wheezing, Disp: 2 Inhaler, Rfl: 3     finasteride (PROSCAR) 5 MG tablet, Take 1 tablet (5 mg) by mouth daily, Disp: 90 tablet, Rfl: 1     fluocinolone acetonide 0.01 % OIL, Use 4-5 drops in right ear 1-2 times daily as needed for itching., Disp: 20 mL, Rfl: 1     lidocaine (XYLOCAINE) 5 % external ointment, Apply topically 2 times daily, Disp: 150 g, Rfl: 0     MULTI-VITAMIN OR,  1 tablet DAILY, Disp: , Rfl:      polyethylene glycol 0.4%- propylene glycol 0.3% (SYSTANE ULTRA) 0.4-0.3 % SOLN ophthalmic solution, Place 1 drop into both eyes 2 times daily, Disp: , Rfl:      sulindac (CLINORIL) 200 MG tablet, Take 1 tablet (200 mg) by mouth 2 times daily, Disp: 60 tablet, Rfl: 11     tamsulosin (FLOMAX) 0.4 MG capsule, TAKE 2 CAPSULES BY MOUTH EVERY DAY, Disp: 60 capsule, Rfl: 0     VITAMIN C OR, 2 TABLETS DAILY, Disp: , Rfl:      fluticasone (FLONASE) 50 MCG/ACT nasal spray, Spray 1 spray into both nostrils daily (Patient not taking: Reported on 12/18/2020), Disp: 16 g, Rfl: 1  Immunization History   Administered Date(s) Administered     Influenza (High Dose) 3 valent vaccine  09/18/2014, 02/02/2017, 10/23/2019     Pneumo Conj 13-V (2010&after) 08/02/2017     Pneumococcal 23 valent 06/23/2011     TD (ADULT, 7+) 05/11/2006     TDAP Vaccine (Boostrix) 10/25/2013     Zoster vaccine, live 04/22/2016     Allergies   Allergen Reactions     Doxazosin      Lightheadedness 2013, resolved with switch to tamsulosin          EXAM:   Constitutional:  Appears well-developed and well-nourished. No distress.   HEENT:   Head: Normocephalic.   Nasal tissue pink and normal appearing.  No rhinorrhea noted.    Eyes: Conjunctivae are non-erythematous   Cardiovascular: Normal rate, regular rhythm and normal heart sounds. Exam reveals no gallop and no friction rub.   No murmur heard.  Respiratory: Effort normal and breath sounds normal. No respiratory distress. No wheezes. No rales.   Musculoskeletal: Normal range of motion.   Neuro: Oriented to person, place, and time.  Skin: Skin is warm and dry. No rash noted.   Psychiatric: Normal mood and affect.     Nursing note and vitals reviewed.      WORKUP:   ENVIRONMENTAL PERCUTANEOUS SKIN TESTING: ADULT  [facility] 12/18/2020   Consent Y   Ordering Physician Jamaal   Interpreting Physician Jamaal   Testing Technician Vickie   Location Back   Time start:  1:00 PM   Time End:  1:15 PM   Positive Control: Histatrol*ALK 1 mg/ml 3/3   Negative Control: 50% Glycerin 0   Cat Hair*ALK (10,000 BAU/ml) 0   AP Dog Hair/Dander (1:100 w/v) 0   Dust Mite p. 30,000 AU/ml 3/3   Dust Mite f. (30,000 AU/ml) 0   Rios (W/F in millimeters) 0   [place] (100,000 BAU/mL) 0   Red Cedar (W/F in millimeters) 0   Maple/[place] (W/F in millimeters) 0   [place]berry (W/F in millimeters) 0   [place] (W/F in millimeters) 0   [place] *ALK (W/F in millimeters) 0   American Elm (W/F in millimeters) 0   [place] (W/F in millimeters) 0   [place] (W/F in millimeters) 0   Birch Mix (W/F in millimeters) 4/12   [place] (W/F in millimeters) 0   Oak (W/F in millimeters) 0    [place] (W/F in millimeters) 0   [place] (W/F in millimeters) 0   [place] (W/F in millimeters) 0   [place] (W/F in millimeters) 0   Nettle (W/F in millimeters) 0   English Plantain (W/F in millimeters) 0   Kochia (W/F in millimeters) 0   Lamb's Quarter (W/F in millimeters) 0   Mars[place]er (W/F in millimeters) 0   Ragweed Mix* ALK (W/F in millimeters) 0   Russian Thistle (W/F in millimeters) 0   Sagebrush/Mugwort (W/F in millimeters) 0   Sheep Sorrel (W/F in millimeters) 0   Feather Mix* ALK (W/F in millimeters) 0   Penicillium Mix (1:10 w/v) 0   Curvularia spicifera (1:10 w/v) 0   Epicoccum (1:10 w/v) 3/3   Aspergillus fumigatus (1:10 w/v): 0   Alternaria tenius (1:10 w/v) 3/3   H. Cladosporium (1:10 w/v) 3/3   Phoma herbarum (1:10 w/v) 3/3        ASSESSMENT/PLAN:  Problem List Items Addressed This Visit        Respiratory    Shortness of breath - Primary     Shortness of breath with associated tightness in chest.  Normal echo in 2018.  This with stress echo.  Normal chest x-ray in 2018.  Symptoms over the last 1 year.    -Chest x-ray.  -Complete pulmonary function studies.  -Trial of albuterol. Albuterol 2-4 puffs inhaled (use a spacer unless using a Proair Respiclick device) every 4 hours as needed for chest tightness, wheezing, shortness of breath and/or coughing.   -Allergy testing done today as noted.         Relevant Medications    albuterol (PROAIR HFA/PROVENTIL HFA/VENTOLIN HFA) 108 (90 Base) MCG/ACT inhaler    Other Relevant Orders    Pulmonary Function Test    CHEST X-RAY 2 VW [31838]    ALLERGY SKIN TESTS,ALLERGENS [64907] (Completed)    Allergic rhinitis due to dust mite    Relevant Medications    albuterol (PROAIR HFA/PROVENTIL HFA/VENTOLIN HFA) 108 (90 Base) MCG/ACT inhaler    Allergic rhinitis due to mold    Relevant Medications    albuterol (PROAIR HFA/PROVENTIL HFA/VENTOLIN HFA) 108 (90 Base) MCG/ACT inhaler    Seasonal allergic rhinitis due to pollen    Relevant Medications    albuterol  (PROAIR HFA/PROVENTIL HFA/VENTOLIN HFA) 108 (90 Base) MCG/ACT inhaler       Other    Post-nasal drip     Perennial postnasal drainage and throat clearing.    Skin testing:  Positive testing for molds, dust mites and trees.     -Allergen avoidance measures discussed and literature provided.   -Use Flonase 1 to 2 sprays per nostril daily.  Use consistently.         Relevant Medications    albuterol (PROAIR HFA/PROVENTIL HFA/VENTOLIN HFA) 108 (90 Base) MCG/ACT inhaler    Other Relevant Orders    ALLERGY SKIN TESTS,ALLERGENS [[id number]] (Completed)        Return in 4 weeks.     Chart documentation with Dragon Voice recognition Software. Although reviewed after completion, some words and grammatical errors may remain.    Rinku Hinton DO FAAAAI  Medical Director for Allergy/Immunology at [place], [place]

## 2020-12-18 NOTE — ASSESSMENT & PLAN NOTE
Shortness of breath with associated tightness in chest.  Normal echo in 2018.  This with stress echo.  Normal chest x-ray in 2018.  Symptoms over the last 1 year.    -Chest x-ray.  -Complete pulmonary function studies.  -Trial of albuterol. Albuterol 2-4 puffs inhaled (use a spacer unless using a Proair Respiclick device) every 4 hours as needed for chest tightness, wheezing, shortness of breath and/or coughing.   -Allergy testing done today as noted.

## 2020-12-18 NOTE — LETTER
12/18/2020         RE: Froy Loredo  [address] [facility] [address]        Dear Colleague,    Thank you for referring your patient, Froy Loredo, to the [facility]. Please see a copy of my visit note below.    Froy Loredo is a 80 year old White male with previous medical history significant for BPH and skin cancer. Froy Loredo is being seen today for evaluation of shortness of breath and post nasal drip. The patient is being seen in consultation at the request of Dr. Mando MD.     Patient reports that for over 1 year he has had perennial and persistent shortness of breath.  He has had associated tightness in chest.  Denies coughing.  Denies wheezing.  Not exertional.  Predominantly present throughout the day.  Not worse in the morning.  Not seasonal.  Not positional.  Not coughing up any sputum.  He had an stress echocardiogram in 2018 that was normal.  No complete PFT.  Chest x-ray was done in 2018 and normal.  Denies history of GERD.  No prior history of asthma.  Non-smoker.  No clear triggers of shortness of breath.  He does have persistent drainage in the back of his throat.  He has persistent throat clearing.  Prescribed Flonase but only used once or twice.  No use of oral antihistamine.  No history of allergy testing.  Postnasal drainage and throat clearing have been present for a few years.  Denies colored mucus.  No clear triggers of symptoms.  The symptoms are perennial without seasonal worsening.    ENVIRONMENTAL HISTORY: [place] The home is heated with a forced air. They do have central air conditioning. The patient's bedroom is furnished with feather/wool bedding or pillows and carpeting in bedroom.  Pets inside the house include 0 pets. There is no history of cockroach or mice infestation. There is/are 0 smokers in the house.  The house does not have a damp basement.     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 No family hx of      Cancer No family hx of      Hypertension No family hx of      Cerebrovascular Disease No family hx of      Thyroid Disease No family hx of      Glaucoma No family hx of      Macular Degeneration No family hx of      Breast Cancer No family hx of      Prostate Cancer No family hx of      Depression No family hx of      Anxiety Disorder No family hx of      Mental Illness No family hx of      Substance Abuse No family hx of      Anesthesia Reaction No family hx of      Asthma No family hx of      Osteoporosis No family hx of      Genetic Disorder No family hx of      Obesity No family hx of      Unknown/Adopted No family hx of      Past Surgical History:   Procedure Laterality Date     BIOPSY       COLONOSCOPY       HC MOHS ANY STAGE EA ADDTL BLOCK AFTER FIRST 5       ORTHOPEDIC SURGERY       ZZC NONSPECIFIC PROCEDURE  5/25/94    quadriceps tear-right     ZZC NONSPECIFIC PROCEDURE      bcc excision left chest       REVIEW OF SYSTEMS:  General: negative for weight gain. negative for weight loss. negative for changes in sleep.   Ears: negative for fullness. negative for hearing loss. negative for dizziness.   Nose: negative for snoring.negative for changes in smell. negative for drainage.   Eyes: negative for eye watering. negative for eye itching. negative for vision changes. negative for eye redness.  Throat: negative for hoarseness. negative for sore throat. negative for trouble swallowing.    Lungs: positive  for shortness of breath.negative for wheezing. negative for sputum production.   Cardiovascular: negative for chest pain. negative for swelling of ankles. negative for fast or irregular heartbeat.   Gastrointestinal: negative for nausea. negative for heartburn. negative for acid reflux.   Musculoskeletal: negative for joint pain. negative for joint stiffness. negative for joint swelling.   Neurologic: negative for seizures. negative for fainting. negative for weakness.   Psychiatric: negative for changes in mood. negative for anxiety.   Endocrine: negative for cold intolerance. negative for heat intolerance. negative for tremors.   Lymphatic: negative for lower extremity swelling. negative for lymph node swelling.   Hematologic: negative for easy bruising. negative for easy bleeding.  Integumentary: negative for rash. negative for scaling. negative for nail changes.       Current Outpatient Medications:      albuterol (PROAIR HFA/PROVENTIL HFA/VENTOLIN HFA) 108 (90 Base) MCG/ACT inhaler, Inhale 2 puffs into the lungs every 4 hours as needed for shortness of breath / dyspnea or wheezing, Disp: 2 Inhaler, Rfl: 3     finasteride (PROSCAR) 5 MG tablet, Take 1 tablet (5 mg) by mouth daily, Disp: 90 tablet, Rfl: 1     fluocinolone acetonide 0.01 % OIL, Use 4-5 drops in right ear 1-2 times daily as needed for itching., Disp: 20 mL, Rfl: 1     lidocaine (XYLOCAINE) 5 % external ointment, Apply topically 2 times daily, Disp: 150 g, Rfl: 0     MULTI-VITAMIN OR,  1 tablet DAILY, Disp: , Rfl:      polyethylene glycol 0.4%- propylene glycol 0.3% (SYSTANE ULTRA) 0.4-0.3 % SOLN ophthalmic solution, Place 1 drop into both eyes 2 times daily, Disp: , Rfl:      sulindac (CLINORIL) 200 MG tablet, Take 1 tablet (200 mg) by mouth 2 times daily, Disp: 60 tablet, Rfl: 11     tamsulosin (FLOMAX) 0.4 MG capsule, TAKE 2 CAPSULES BY MOUTH EVERY DAY, Disp: 60 capsule, Rfl: 0     VITAMIN C OR, 2 TABLETS DAILY, Disp: , Rfl:       fluticasone (FLONASE) 50 MCG/ACT nasal spray, Spray 1 spray into both nostrils daily (Patient not taking: Reported on 12/18/2020), Disp: 16 g, Rfl: 1  Immunization History   Administered Date(s) Administered     Influenza (High Dose) 3 valent vaccine 09/18/2014, 02/02/2017, 10/23/2019     Pneumo Conj 13-V (2010&after) 08/02/2017     Pneumococcal 23 valent 06/23/2011     TD (ADULT, 7+) 05/11/2006     TDAP Vaccine (Boostrix) 10/25/2013     Zoster vaccine, live 04/22/2016     Allergies   Allergen Reactions     Doxazosin      Lightheadedness 2013, resolved with switch to tamsulosin          EXAM:   Constitutional:  Appears well-developed and well-nourished. No distress.   HEENT:   Head: Normocephalic.   Nasal tissue pink and normal appearing.  No rhinorrhea noted.    Eyes: Conjunctivae are non-erythematous   Cardiovascular: Normal rate, regular rhythm and normal heart sounds. Exam reveals no gallop and no friction rub.   No murmur heard.  Respiratory: Effort normal and breath sounds normal. No respiratory distress. No wheezes. No rales.   Musculoskeletal: Normal range of motion.   Neuro: Oriented to person, place, and time.  Skin: Skin is warm and dry. No rash noted.   Psychiatric: Normal mood and affect.     Nursing note and vitals reviewed.      WORKUP:   ENVIRONMENTAL PERCUTANEOUS SKIN TESTING: ADULT  [facility] [employer] 12/18/2020   Consent Y   Ordering Physician Jamaal   Interpreting Physician Jamaal   Testing Technician Vickie   Location Back   Time start:  1:00 PM   Time End:  1:15 PM   Positive Control: Histatrol*ALK 1 mg/ml 3/3   Negative Control: 50% Glycerin 0   Cat Hair*ALK (10,000 BAU/ml) 0   AP Dog Hair/Dander (1:100 w/v) 0   Dust Mite p. 30,000 AU/ml 3/3   Dust Mite f. (30,000 AU/ml) 0   Rios (W/F in millimeters) 0   Chris Grass (100,000 BAU/mL) 0   Red Cedar (W/F in millimeters) 0   Maple/Haralson (W/F in millimeters) 0   Hackberry (W/F in millimeters) 0   M[place]vale (W/F in millimeters) 0    White Pine *ALK (W/F in millimeters) 0   American Elm (W/F in millimeters) 0   [place] (W/F in millimeters) 0   [employer] (W/F in millimeters) 0   Birch Mix (W/F in millimeters) 4/12   [place]ton (W/F in millimeters) 0   Oak (W/F in millimeters) 0   Cocklebur (W/F in millimeters) 0   [place]erton (W/F in millimeters) 0   White Jeffrey (W/F in millimeters) 0   Careless (W/F in millimeters) 0   Nettle (W/F in millimeters) 0   English Plantain (W/F in millimeters) 0   Kochia (W/F in millimeters) 0   Lamb's Quarter (W/F in millimeters) 0   Marshelder (W/F in millimeters) 0   Ragweed Mix* ALK (W/F in millimeters) 0   Russian Thistle (W/F in millimeters) 0   Sagebrush/Mugwort (W/F in millimeters) 0   Sheep Sorrel (W/F in millimeters) 0   Feather Mix* ALK (W/F in millimeters) 0   Penicillium Mix (1:10 w/v) 0   Curvularia spicifera (1:10 w/v) 0   Epicoccum (1:10 w/v) 3/3   Aspergillus fumigatus (1:10 w/v): 0   Alternaria tenius (1:10 w/v) 3/3   H. Cladosporium (1:10 w/v) 3/3   Phoma herbarum (1:10 w/v) 3/3        ASSESSMENT/PLAN:  Problem List Items Addressed This Visit        Respiratory    Shortness of breath - Primary     Shortness of breath with associated tightness in chest.  Normal echo in 2018.  This with stress echo.  Normal chest x-ray in 2018.  Symptoms over the last 1 year.    -Chest x-ray.  -Complete pulmonary function studies.  -Trial of albuterol. Albuterol 2-4 puffs inhaled (use a spacer unless using a Proair Respiclick device) every 4 hours as needed for chest tightness, wheezing, shortness of breath and/or coughing.   -Allergy testing done today as noted.         Relevant Medications    albuterol (PROAIR HFA/PROVENTIL HFA/VENTOLIN HFA) 108 (90 Base) MCG/ACT inhaler    Other Relevant Orders    Pulmonary Function Test    CHEST X-RAY 2 VW [70134]    ALLERGY SKIN TESTS,ALLERGENS [41255] (Completed)    Allergic rhinitis due to dust mite    Relevant Medications    albuterol (PROAIR HFA/PROVENTIL HFA/VENTOLIN HFA) 108  (90 Base) MCG/ACT inhaler    Allergic rhinitis due to mold    Relevant Medications    albuterol (PROAIR HFA/PROVENTIL HFA/VENTOLIN HFA) 108 (90 Base) MCG/ACT inhaler    Seasonal allergic rhinitis due to pollen    Relevant Medications    albuterol (PROAIR HFA/PROVENTIL HFA/VENTOLIN HFA) 108 (90 Base) MCG/ACT inhaler       Other    Post-nasal drip     Perennial postnasal drainage and throat clearing.    Skin testing:  Positive testing for molds, dust mites and trees.     -Allergen avoidance measures discussed and literature provided.   -Use Flonase 1 to 2 sprays per nostril daily.  Use consistently.         Relevant Medications    albuterol (PROAIR HFA/PROVENTIL HFA/VENTOLIN HFA) 108 (90 Base) MCG/ACT inhaler    Other Relevant Orders    ALLERGY SKIN TESTS,ALLERGENS [30900] (Completed)        Return in 4 weeks.     Chart documentation with Dragon Voice recognition Software. Although reviewed after completion, some words and grammatical errors may remain.    Rinku Hinotn DO FAAAAI  Medical Director for Allergy/Immunology at [place], [place]        Again, thank you for allowing me to participate in the care of your patient.        Sincerely,        Rinku Hinton DO

## 2020-12-18 NOTE — PATIENT INSTRUCTIONS
Allergy Staff Appt Hours Shot Hours Locations    Physician     Rinku Hinton DO       Support Staff     RENEE Johnson CMA  Tuesday:        [place] 7-5     Wednesday:        [place]: 7-5 Thursday:                    [place] 7-6     Friday:  [place]  7-2   [place]        Thursday: 8-5:20        Friday: 7-12     [place]        Tuesday: 7- 3:20 Wednesday: 7-4:20     [place] Monday: 7-2:20 Tuesday: 9-5:20         [facility]  [address] [place] [address]  Appt Line: [phone]  Allergy RN:  [phone]    [facility]  [address] [place] [address]  Appt Line: [phone]  Allergy RN:  [phone]       Important Scheduling Information  Aspirin Desensitization: Appt will last 2 clinic days. Please call the Allergy RN line for your clinic to schedule. Discontinue antihistamines 7 days prior to the appointment.     Food Challenges: Appt will last 3-4 hours. Please call the Allergy RN line for your clinic to schedule. Discontinue antihistamines 7 days prior to the appointment.     Penicillin Testing: Appt will last 2-3 hours. Please call the Allergy RN line for your clinic to schedule. Discontinue antihistamines 7 days prior to the appointment.     Skin Testing: Appt will about 40 minutes. Call the appointment line for your clinic to schedule. Discontinue antihistamines 7 days prior to the appointment.     Venom Testing: Appt will last 2-3 hours. Please call the Allergy RN line for your clinic to schedule. Discontinue antihistamines 7 days prior to the appointment.     Thank you for trusting us with your Allergy, Asthma, and Immunology care. Please feel free to contact us with any questions or concerns you may have.      -Use Flonase 1 to 2 sprays per nostril daily.  Use consistently.  -Chest x-ray today.  -Complete pulmonary function studies. This will be done in [place].   -Trial of albuterol. Albuterol 2-4 puffs inhaled (use a spacer  unless using a Proair Respiclick device) every 4 hours as needed for chest tightness, wheezing, shortness of breath and/or coughing.     AEROALLERGEN AVOIDANCE INSTRUCTIONS  MOLD  Indoors, mold season is year round. Outdoors, most mold prefer seasons with high humidity. Mold prefers damp, dark, warm places. Here are some tips on how to avoid mold exposure.  1.  Keep humidity inside between 35-50% with air conditioning or dehumidifier. The humidity level can be checked with a meter from a hardware store.   2.  Clean surfaces where mold grows and dry wet areas.  3.  Avoid steam cleaning carpets and discard moldy belongings.  4.  Wear a mask when doing yard work and refrain from walking through uncut fields or playing in leaves.  5.  Minimize use of potted plants and do not keep them indoors.  6.  Consider an allergy cover for the pillow and mattress.  POLLEN  Pollens are the tiny airborne particles given off by trees, weeds, and grasses. They can be the cause of seasonal allergic rhinitis or hay fever symptoms, which include stuffy, itchy, runny nose, redness, swelling and itching of the eyes, and itching of the ears and throat. Here are some tips on how to avoid pollen exposure.  1. .Keep windows closed and use the air conditioner when possible.  2.  Avoid outside exposure in the early morning as pollen counts are highest at that time.  3.  Take a shower and wash hair each night.  4.  Consider wearing a mask when working in the yard and/or garden.  5.  Clean furnace filter monthly with HEPA filters. Consider a HEPA filter vacuum  which will prevent pollen from being reintroduced into the air.   DUST MITES  Dust mites can never be entirely eliminated in the house no matter how clean your house is. Dust mites are attracted to warm, moist areas and feed on dead skin flakes. Here are tips to minimize dust mites in your home.  1.  Encase pillows and mattress/box springs in zippered allergy covers.  2.  Wash bedding  in hot water (at least 130 F) every 7-14 days.  3.  Avoid curtains, carpet, and upholstered furniture if possible.  4.  Use HEPA air filters and a HEPA filter vacuum . Change filters monthly. Vacuum weekly.  5.  Keep bedroom simple, avoiding clutter, so it can quickly be dusted.  6.  Cover heating vents with vent filters.  7.  Keep stuffed toys in a closed container and wash or freeze regularly.  8.  Keep clothing in the closet with the door closed.

## 2021-06-04 ENCOUNTER — OFFICE VISIT (OUTPATIENT)
Dept: URGENT CARE | Facility: URGENT CARE | Age: 81
End: 2021-06-04
Payer: COMMERCIAL

## 2021-06-04 VITALS
WEIGHT: 151 LBS | TEMPERATURE: 98 F | DIASTOLIC BLOOD PRESSURE: 74 MMHG | OXYGEN SATURATION: 96 % | SYSTOLIC BLOOD PRESSURE: 143 MMHG | BODY MASS INDEX: 23.65 KG/M2 | HEART RATE: 100 BPM

## 2021-06-04 DIAGNOSIS — T14.8XXA BRUISING: ICD-10-CM

## 2021-06-04 DIAGNOSIS — R58 ECCHYMOSIS: Primary | ICD-10-CM

## 2021-06-04 LAB
BASOPHILS # BLD AUTO: 0 10E9/L (ref 0–0.2)
BASOPHILS NFR BLD AUTO: 0.3 %
DIFFERENTIAL METHOD BLD: ABNORMAL
EOSINOPHIL # BLD AUTO: 0.1 10E9/L (ref 0–0.7)
EOSINOPHIL NFR BLD AUTO: 1.1 %
ERYTHROCYTE [DISTWIDTH] IN BLOOD BY AUTOMATED COUNT: 13.8 % (ref 10–15)
HCT VFR BLD AUTO: 48.8 % (ref 40–53)
HGB BLD-MCNC: 15.5 G/DL (ref 13.3–17.7)
LYMPHOCYTES # BLD AUTO: 0.4 10E9/L (ref 0.8–5.3)
LYMPHOCYTES NFR BLD AUTO: 4.8 %
MCH RBC QN AUTO: 30.6 PG (ref 26.5–33)
MCHC RBC AUTO-ENTMCNC: 31.8 G/DL (ref 31.5–36.5)
MCV RBC AUTO: 96 FL (ref 78–100)
MONOCYTES # BLD AUTO: 0.6 10E9/L (ref 0–1.3)
MONOCYTES NFR BLD AUTO: 6.2 %
NEUTROPHILS # BLD AUTO: 8 10E9/L (ref 1.6–8.3)
NEUTROPHILS NFR BLD AUTO: 87.6 %
PLATELET # BLD AUTO: 218 10E9/L (ref 150–450)
RBC # BLD AUTO: 5.06 10E12/L (ref 4.4–5.9)
WBC # BLD AUTO: 9.1 10E9/L (ref 4–11)

## 2021-06-04 PROCEDURE — 85610 PROTHROMBIN TIME: CPT | Performed by: FAMILY MEDICINE

## 2021-06-04 PROCEDURE — 36415 COLL VENOUS BLD VENIPUNCTURE: CPT | Performed by: FAMILY MEDICINE

## 2021-06-04 PROCEDURE — 85025 COMPLETE CBC W/AUTO DIFF WBC: CPT | Performed by: FAMILY MEDICINE

## 2021-06-04 PROCEDURE — 99213 OFFICE O/P EST LOW 20 MIN: CPT | Performed by: FAMILY MEDICINE

## 2021-06-04 ASSESSMENT — PAIN SCALES - GENERAL: PAINLEVEL: EXTREME PAIN (8)

## 2021-06-05 LAB — INR PPP: 1.18 (ref 0.86–1.14)

## 2021-06-05 NOTE — PROGRESS NOTES
SUBJECTIVE:  Patient presents with bruise and pain rt knee for one week.  No known injury.    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 20 mL 1     lidocaine (XYLOCAINE) 5 % external ointment Apply topically 2 times daily 150 g 0     MULTI-VITAMIN OR  1 tablet DAILY       polyethylene glycol 0.4%- propylene glycol 0.3% (SYSTANE ULTRA) 0.4-0.3 % SOLN ophthalmic solution Place 1 drop into both eyes 2 times daily       sulindac (CLINORIL) 200 MG tablet Take 1 tablet (200 mg) by mouth 2 times daily 60 tablet 11     tamsulosin (FLOMAX) 0.4 MG capsule TAKE 2 CAPSULES BY MOUTH EVERY DAY 60 capsule 0     VITAMIN C OR 2 TABLETS DAILY       fluticasone (FLONASE) 50 MCG/ACT nasal spray Spray 1 spray into both nostrils daily (Patient not taking: Reported on 12/18/2020) 16 g 1     History   Smoking Status     Never Smoker   Smokeless Tobacco     Never Used     Comment: Lives in smoke free household         OBJECITVE;  BP (!) 143/74   Pulse 100   Temp 98  F (36.7  C) (Oral)   Wt 68.5 kg (151 lb)   SpO2 96%   BMI 23.65 kg/m    Slightly anxious  Large ecchymotic area anterior and medial rt knee.  Swelling is quite mild  and only slightly tender.  Not warm.  No lymphadenopathy or streaking.  Lab: hgb, wbc and plts are normal.  Inr pending but not on coumadin  ASSESSMENT:  Ecchymosis etiology unclear    PLAN: I believe observation warranted at this time.  I pain worsens or persists,  Xray for occult fracture or other bone abnormality might be in order.  Symptomatic therapy suggested: maintain normal activities.  Observe.    Follow up with primary care provider if no improvement or worsening sx

## 2021-06-07 ENCOUNTER — OFFICE VISIT (OUTPATIENT)
Dept: FAMILY MEDICINE | Facility: CLINIC | Age: 81
End: 2021-06-07
Payer: COMMERCIAL

## 2021-06-07 VITALS
BODY MASS INDEX: 23.34 KG/M2 | WEIGHT: 149 LBS | SYSTOLIC BLOOD PRESSURE: 149 MMHG | TEMPERATURE: 97.8 F | OXYGEN SATURATION: 96 % | HEART RATE: 79 BPM | DIASTOLIC BLOOD PRESSURE: 74 MMHG

## 2021-06-07 DIAGNOSIS — T14.8XXA BRUISING: Primary | ICD-10-CM

## 2021-06-07 DIAGNOSIS — M25.461 PAIN AND SWELLING OF KNEE, RIGHT: ICD-10-CM

## 2021-06-07 DIAGNOSIS — M25.561 PAIN AND SWELLING OF KNEE, RIGHT: ICD-10-CM

## 2021-06-07 PROCEDURE — 99214 OFFICE O/P EST MOD 30 MIN: CPT | Performed by: FAMILY MEDICINE

## 2021-06-07 NOTE — PROGRESS NOTES
SUBJECTIVE:  Froy Loredo, a 80 year old male scheduled an appointment to discuss the following issues:  Follow-up bruising and borderline high INR.   No major injury. S/p knee cap surgery 1994.   No hematuria/black or bloody stools. No abdominal pain.   No regular asa or nsaids. No fish oil. No ALCOHOL. No nausea, vomiting or diarrhea.    No fevers or chills. Poor  Fruits/veggies and too much pop.   Eats meat. No bruising elsewhere. Breathing ok. No chest pain.    Medical, social, surgical, and family histories reviewed.    ROS:  All other ROS negative.   OBJECTIVE:  BP (!) 149/74   Pulse 79   Temp 97.8  F (36.6  C) (Tympanic)   Wt 67.6 kg (149 lb)   SpO2 96%   BMI 23.34 kg/m    EXAM:  GENERAL APPEARANCE: healthy, alert and no distress  RESP: lungs clear to auscultation - no rales, rhonchi or wheezes  CV: regular rates and rhythm, normal S1 S2, no S3 or S4 and no murmur, click or rub -  ABDOMEN:  soft, nontender, no HSM or masses and bowel sounds normal  MS: extremities normal- no gross deformities noted, no evidence of inflammation in joints, FROM in all extremities.  SKIN: large bruise around right knee cap with mild swelling. Good RANGE OF MOTION   NEURO: Normal strength and tone, sensory exam grossly normal, mentation intact and speech normal  PSYCH: mentation appears normal and affect normal/bright    ASSESSMENT / PLAN:  (T14.8XXA) Bruising  (primary encounter diagnosis)  Comment: likely mild trauma to old surgery site and low vitaminK from poor diet  Plan: more fruits/veggies. Recheck in 3 months  Sooner if worsening bleeding/brusing issues. Acutely worsening fatigue/ shortness of breath or bleeding to ER. Expected course and warning signs reviewed. .Call/email with questions/concerns   Discussed foods jluis in [place].     (W42.508,  R72.497) Pain and swelling of knee, right  Comment: likely from old surgery  Plan: follow-up ortho if not improving. Tylenol prn.     Gallito Gilmore MD

## 2021-10-26 ENCOUNTER — OFFICE VISIT (OUTPATIENT)
Dept: FAMILY MEDICINE | Facility: CLINIC | Age: 81
End: 2021-10-26
Payer: COMMERCIAL

## 2021-10-26 VITALS
DIASTOLIC BLOOD PRESSURE: 84 MMHG | HEART RATE: 101 BPM | OXYGEN SATURATION: 100 % | SYSTOLIC BLOOD PRESSURE: 137 MMHG | BODY MASS INDEX: 23.54 KG/M2 | TEMPERATURE: 98.1 F | HEIGHT: 67 IN | WEIGHT: 150 LBS

## 2021-10-26 DIAGNOSIS — R23.3 ABNORMAL BRUISING: ICD-10-CM

## 2021-10-26 DIAGNOSIS — R41.3 MEMORY DIFFICULTIES: Primary | ICD-10-CM

## 2021-10-26 DIAGNOSIS — R79.1 ABNORMAL COAGULATION PROFILE: ICD-10-CM

## 2021-10-26 LAB
INR PPP: 1.16 (ref 0.85–1.15)
TSH SERPL DL<=0.005 MIU/L-ACNC: 1.94 MU/L (ref 0.4–4)
VIT B12 SERPL-MCNC: 358 PG/ML (ref 193–986)

## 2021-10-26 PROCEDURE — 86618 LYME DISEASE ANTIBODY: CPT | Performed by: FAMILY MEDICINE

## 2021-10-26 PROCEDURE — 85610 PROTHROMBIN TIME: CPT | Performed by: FAMILY MEDICINE

## 2021-10-26 PROCEDURE — 82607 VITAMIN B-12: CPT | Performed by: FAMILY MEDICINE

## 2021-10-26 PROCEDURE — 84443 ASSAY THYROID STIM HORMONE: CPT | Performed by: FAMILY MEDICINE

## 2021-10-26 PROCEDURE — 36415 COLL VENOUS BLD VENIPUNCTURE: CPT | Performed by: FAMILY MEDICINE

## 2021-10-26 PROCEDURE — 99214 OFFICE O/P EST MOD 30 MIN: CPT | Performed by: FAMILY MEDICINE

## 2021-10-26 ASSESSMENT — MIFFLIN-ST. JEOR: SCORE: 1334.03

## 2021-10-26 NOTE — PROGRESS NOTES
"SUBJECTIVE:  Froy Loredo, a 81 year old male scheduled an appointment to discuss the following issues:  Follow-up elevated blood pressure/bruising, bph. wheezing and borderline inr.   Bruising stable. No active bleeding.no black/bloody stools. Urine overall ok. Flow overall ok. No nocturua. No hematuria.   No blood pressure cuff at home  Here with wife.    Sleep overall ok. Caffeine - limited. No ALCOHOL. No regular asa/nsaids. No fish oil.   No chest pain or shortness of breath.  Exercise - walking.   No falls. Memory - a little down.   2 boys in [place] and 4 grandkids.   Wife concerns about short term memory issues.   No headaches. No major head injury.   No blurry vision. Due for eye exam.   Slowly worsening memory past couple years. On/off issues.   Got lost once.  Not driving.     Medical, social, surgical, and family histories reviewed.    ROS:  All other ROS negative.    OBJECTIVE:  /84   Pulse 101   Temp 98.1  F (36.7  C) (Tympanic)   Ht 1.702 m (5' 7\")   Wt 68 kg (150 lb)   SpO2 100%   BMI 23.49 kg/m    EXAM:  GENERAL APPEARANCE: healthy, alert and no distress  EYES: EOMI,  PERRL  HENT: ear canals and TM's normal and nose and mouth without ulcers or lesions  NECK: no adenopathy, no asymmetry, masses, or scars and thyroid normal to palpation  RESP: lungs clear to auscultation - no rales, rhonchi or wheezes  CV: regular rates and rhythm, normal S1 S2, no S3 or S4 and no murmur, click or rub -  ABDOMEN:  soft, nontender, no HSM or masses and bowel sounds normal  MS: extremities normal- no gross deformities noted, no evidence of inflammation in joints, FROM in all extremities.  NEURO: Normal strength and tone, sensory exam grossly normal, mentation intact and speech normal  PSYCH: mentation appears normal and affect normal/bright    ASSESSMENT / PLAN:  (R41.3) Memory difficulties  (primary encounter diagnosis)  Comment: early alzheimer   Plan: TSH with free T4 reflex, Vitamin B12, Lyme         " Disease Chanda with reflex to WB Serum, Adult         Neurology Referral        Wife would like neurology opinion. Keep active mentally and physically.   Self-monitor blood pressure.     (R23.8) Abnormal bruising  Comment: stable  Plan: Vitamin B12, INR        Await labs. Consider hematology if worse. Continue limit nsaid/asa and avoid ALCOHOL    (R79.1) Abnormal coagulation profile     Plan: INR        See above.     Gallito Gilmore MD

## 2021-10-27 LAB — B BURGDOR IGG+IGM SER QL: 0.05

## 2021-11-03 ENCOUNTER — TELEPHONE (OUTPATIENT)
Dept: FAMILY MEDICINE | Facility: CLINIC | Age: 81
End: 2021-11-03

## 2021-11-03 NOTE — TELEPHONE ENCOUNTER
Patient wife stopped by clinic to clarify information from last visit.   Wife would like to know what blood pressure range is ok for patient now that he is monitoring at home.   Will route to provider to advise.  Patient will need to be contacted with this information.     Curtis Acevedo, CMA

## 2021-12-14 ENCOUNTER — PREPPED CHART (OUTPATIENT)
Dept: URBAN - METROPOLITAN AREA CLINIC 98 | Facility: CLINIC | Age: 81
End: 2021-12-14

## 2021-12-14 PROBLEM — H35.3131 DRY AMD, EARLY DRY STAGE: Status: STABILIZING | Noted: 2021-12-14 | Resolved: 2021-12-14

## 2021-12-14 PROBLEM — H35.3131 DRY AMD, EARLY DRY STAGE: Noted: 2021-12-14

## 2021-12-14 PROBLEM — Z79.4 DIABETIC MACULAR EDEMA: Status: RESOLVED | Noted: 2021-12-14 | Resolved: 2021-12-14

## 2021-12-14 PROBLEM — H26.493 POSTERIOR CAPSULAR OPACITY: Noted: 2021-12-14 | Resolved: 2021-12-14

## 2021-12-14 PROBLEM — Z79.4 DIABETIC MACULAR EDEMA: Noted: 2021-12-14

## 2021-12-14 PROBLEM — H35.3131 DRY AMD, EARLY DRY STAGE: Status: STABILIZING | Noted: 2021-12-14

## 2021-12-14 PROBLEM — H43.813 POSTERIOR VITREOUS DETACHMENT: Status: STABILIZING | Noted: 2021-12-14

## 2021-12-14 PROBLEM — H43.813 POSTERIOR VITREOUS DETACHMENT: Status: STABILIZING | Noted: 2021-12-14 | Resolved: 2021-12-14

## 2021-12-14 PROBLEM — E11.3212 MILD NONPROLIFERATIVE DIABETIC RETINOPATHY: Status: STABILIZING | Noted: 2021-12-14 | Resolved: 2021-12-14

## 2021-12-14 PROBLEM — H35.371 EPIRETINAL MEMBRANE: Status: STABILIZING | Noted: 2021-12-14 | Resolved: 2021-12-14

## 2021-12-14 PROBLEM — Z79.4 MILD NONPROLIFERATIVE DIABETIC RETINOPATHY: Noted: 2021-12-14

## 2021-12-14 PROBLEM — Z79.4 MILD NONPROLIFERATIVE DIABETIC RETINOPATHY: Status: STABILIZING | Noted: 2021-12-14

## 2021-12-14 PROBLEM — E11.3212 DIABETIC MACULAR EDEMA: Status: STABILIZING | Noted: 2021-12-14

## 2021-12-14 PROBLEM — H43.813 POSTERIOR VITREOUS DETACHMENT: Noted: 2021-12-14

## 2021-12-14 PROBLEM — E11.3212 DIABETIC MACULAR EDEMA: Status: RESOLVED | Noted: 2021-12-14 | Resolved: 2021-12-14

## 2021-12-14 PROBLEM — Z79.4 MILD NONPROLIFERATIVE DIABETIC RETINOPATHY: Status: STABILIZING | Noted: 2021-12-14 | Resolved: 2021-12-14

## 2021-12-14 PROBLEM — H26.493 POSTERIOR CAPSULAR OPACITY: Noted: 2021-12-14

## 2021-12-14 PROBLEM — E11.3291 MILD NONPROLIFERATIVE DIABETIC RETINOPATHY: Status: STABILIZING | Noted: 2021-12-14 | Resolved: 2021-12-14

## 2021-12-14 PROBLEM — Z79.4 DIABETIC MACULAR EDEMA: Status: STABILIZING | Noted: 2021-12-14

## 2021-12-14 PROBLEM — H35.371 EPIRETINAL MEMBRANE: Status: STABILIZING | Noted: 2021-12-14

## 2021-12-14 PROBLEM — E11.3291 MILD NONPROLIFERATIVE DIABETIC RETINOPATHY: Status: STABILIZING | Noted: 2021-12-14

## 2021-12-14 PROBLEM — E11.3212 MILD NONPROLIFERATIVE DIABETIC RETINOPATHY: Status: STABILIZING | Noted: 2021-12-14

## 2021-12-14 PROBLEM — H35.371 EPIRETINAL MEMBRANE: Noted: 2021-12-14

## 2021-12-14 PROBLEM — E11.3291 MILD NONPROLIFERATIVE DIABETIC RETINOPATHY: Noted: 2021-12-14

## 2021-12-14 PROBLEM — E11.3212 DIABETIC MACULAR EDEMA: Noted: 2021-12-14

## 2021-12-14 PROBLEM — E11.3212 MILD NONPROLIFERATIVE DIABETIC RETINOPATHY: Noted: 2021-12-14

## 2022-03-12 ENCOUNTER — OFFICE VISIT (OUTPATIENT)
Dept: OTHER | Facility: CLINIC | Age: 82
End: 2022-03-12
Payer: COMMERCIAL

## 2022-03-12 VITALS
SYSTOLIC BLOOD PRESSURE: 140 MMHG | DIASTOLIC BLOOD PRESSURE: 90 MMHG | HEART RATE: 89 BPM | RESPIRATION RATE: 16 BRPM | TEMPERATURE: 97.6 F | HEIGHT: 68 IN | WEIGHT: 155 LBS | OXYGEN SATURATION: 96 % | BODY MASS INDEX: 23.49 KG/M2

## 2022-03-12 DIAGNOSIS — Z00.00 ENCOUNTER FOR MEDICARE ANNUAL WELLNESS EXAM: Primary | ICD-10-CM

## 2022-03-12 PROCEDURE — G0439 PPPS, SUBSEQ VISIT: HCPCS | Performed by: FAMILY MEDICINE

## 2022-03-12 RX ORDER — GALANTAMINE HYDROBROMIDE 8 MG/1
8 CAPSULE, EXTENDED RELEASE ORAL
COMMUNITY

## 2022-03-12 ASSESSMENT — ACTIVITIES OF DAILY LIVING (ADL): CURRENT_FUNCTION: NO ASSISTANCE NEEDED

## 2022-03-12 ASSESSMENT — PAIN SCALES - GENERAL: PAINLEVEL: NO PAIN (0)

## 2022-03-12 NOTE — PATIENT INSTRUCTIONS
Patient Education   Personalized Prevention Plan  You are due for the preventive services outlined below.  Your care team is available to assist you in scheduling these services.  If you have already completed any of these items, please share that information with your care team to update in your medical record.  Health Maintenance Due   Topic Date Due     ANNUAL REVIEW OF HM ORDERS  Never done     Zoster (Shingles) Vaccine (2 of 3) 06/17/2016     Annual Wellness Visit  08/02/2019     Discuss Advance Care Planning  09/18/2019     Eye Exam  11/14/2019     Prostate Test  08/02/2020     FALL RISK ASSESSMENT  12/03/2021        Patient Education   Personalized Prevention Plan  You are due for the preventive services outlined below.  Your care team is available to assist you in scheduling these services.  If you have already completed any of these items, please share that information with your care team to update in your medical record.  Health Maintenance Due   Topic Date Due     ANNUAL REVIEW OF HM ORDERS  Never done     Zoster (Shingles) Vaccine (2 of 3) 06/17/2016     Discuss Advance Care Planning  09/18/2019     Eye Exam  11/14/2019     Prostate Test  08/02/2020     FALL RISK ASSESSMENT  12/03/2021

## 2022-03-12 NOTE — PROGRESS NOTES
Assessment & Plan     ICD-10-CM    1. Encounter for Medicare annual wellness exam  Z00.00        Follow Up/Next Steps    Return in about 53 weeks (around 3/18/2023) for Annual Wellness Visit.  Recommended that patient follow up with PCP to address the following : Patient BP's reading is 140/90. Patient is not on BP medication.  Patient states his PCP is aware of about the elvated BP and recommended by the PCP to check his BP everyday . When writer asked the patient if he has his BP reading records, patient states he doesn't have the record and he is forgetting to keep the record and to check his BP everyday.  Patient unable to draw the clock but able to recall the three items. Overdue for eye exam and PSA test. Spouse present during this visit.  Writer recommended to make a follow up appt. with PCP.  Patient's spouse states she will do the appt. with PCP soon. Continue to follow up with PCP for chronic conditions.    Counseling and Education  Reviewed preventive health counseling, as reflected in patient instructions        Appropriate preventive services were discussed with this patient, including applicable screening as appropriate for cardiovascular disease, diabetes, osteopenia/osteoporosis, and glaucoma.  As appropriate for age/gender, discussed screening for colorectal cancer, prostate cancer, breast cancer, and cervical cancer. Checklist reviewing preventive services available has been given to the patient.    Reviewed patients plan of care and provided an AVS. The Basic Care Plan (routine screening as documented in Health Maintenance) for Froy meets the Care Plan requirement. This Care Plan has been established and reviewed with the Patient.    Visit Provider: Zara Main RN  Supervising Provider: Waleska Barrera MD, MD  [facility]       Subjective   Froy is a 81 year old who is being seen for an Annual Wellness Visit  accompanied by his spouse.    Healthy  "Habits:     In general, how would you rate your overall health?  Good    Frequency of exercise:  2-3 days/week    Duration of exercise:  15-30 minutes    Do you usually eat at least 4 servings of fruit and vegetables a day, include whole grains    & fiber and avoid regularly eating high fat or \"junk\" foods?  No    Taking medications regularly:  Yes    Medication side effects:  None    Ability to successfully perform activities of daily living:  No assistance needed    Home Safety:  Lack of grab bars in the bathroom    Hearing Impairment:  No hearing concerns    In the past 6 months, have you been bothered by leaking of urine?  No    In general, how would you rate your overall mental or emotional health?  Good      PHQ-2 Total Score: 0    Additional concerns today:  No    Do you feel safe in your environment? Yes    Have you ever done Advance Care Planning? (For example, a Health Directive, POLST, or a discussion with a medical provider or your loved ones about your wishes): Yes, patient states has an Advance Care Planning document and will bring a copy to the clinic.    Fall risk  Fallen 2 or more times in the past year?: No  Any fall with injury in the past year?: No  Cognitive Screening   1) Repeat 3 items (Leader, Season, Table)    2) Clock draw: ABNORMAL Patient unable to draw the clock  3) 3 item recall: Recalls 3 objects  Results: 3 items recalled: COGNITIVE IMPAIRMENT LESS LIKELY    Mini-CogTM Copyright S Ju[employer]. Licensed by the author for use in [facility]; reprinted with permission ([email or web address]). All rights reserved.      Do you have sleep apnea, excessive snoring or daytime drowsiness?: no    Reviewed and updated as needed this visit by clinical staff    Allergies  Meds  Problems             Social History     Tobacco Use     Smoking status: Never Smoker     Smokeless tobacco: Never Used     Tobacco comment: Lives in smoke free household   Substance Use Topics     Alcohol use: No     " "Alcohol/week: 0.0 standard drinks       Current providers sharing in care for this patient include:   Patient Care Team:  Bj Hull MD as PCP - General (Family Practice)  Lise Sahni RN as   Gallito Gilmore MD as Assigned PCP  Rinku Hinton MD as Assigned Allergy Provider    The following health maintenance items are reviewed in Epic and correct as of today:  Health Maintenance Due   Topic Date Due     ANNUAL REVIEW OF HM ORDERS  Never done     ZOSTER IMMUNIZATION (2 of 3) 06/17/2016     ADVANCE CARE PLANNING  09/18/2019     EYE EXAM  11/14/2019     PSA  08/02/2020     FALL RISK ASSESSMENT  12/03/2021               Objective    BP (!) 140/90 (BP Location: Left arm, Patient Position: Sitting, Cuff Size: Adult Regular)   Pulse 89   Temp 97.6  F (36.4  C) (Temporal)   Resp 16   Ht 1.727 m (5' 8\")   Wt 70.3 kg (155 lb)   SpO2 96%   BMI 23.57 kg/m   Estimated body mass index is 23.57 kg/m  as calculated from the following:    Height as of this encounter: 1.727 m (5' 8\").    Weight as of this encounter: 70.3 kg (155 lb).  Physical Exam  Patient appears comfortable and in no acute distress.        Identified Health Risks:  "

## 2022-03-21 ENCOUNTER — OFFICE VISIT (OUTPATIENT)
Dept: URGENT CARE | Facility: URGENT CARE | Age: 82
End: 2022-03-21
Payer: COMMERCIAL

## 2022-03-21 VITALS
HEART RATE: 83 BPM | SYSTOLIC BLOOD PRESSURE: 132 MMHG | DIASTOLIC BLOOD PRESSURE: 80 MMHG | OXYGEN SATURATION: 97 % | BODY MASS INDEX: 22.62 KG/M2 | WEIGHT: 148.8 LBS | TEMPERATURE: 98.2 F

## 2022-03-21 DIAGNOSIS — H61.22 IMPACTED CERUMEN OF LEFT EAR: Primary | ICD-10-CM

## 2022-03-21 PROCEDURE — 69209 REMOVE IMPACTED EAR WAX UNI: CPT | Mod: LT | Performed by: NURSE PRACTITIONER

## 2022-03-21 NOTE — PATIENT INSTRUCTIONS
Patient Education       Earwax, Home Treatment    Everyone produces earwax from the lining of the ear canal. It serves to lubricate and protect the ear. The wax that forms in the canal naturally moves toward the outside of the ear and falls out. Sometimes the ear canal may contain too much wax. This can cause a blockage and loss of hearing. Directions are given below for home treatment.  Home care  If your doctor has advised you to remove a wax blockage yourself, follow these directions:    Unless a medicine was prescribed, you may use an over-the-counter product made for clearing earwax. These contain carbamide peroxide. Lie down with the blocked ear facing upward. Apply one dropper full of medicine and wait a few minutes. Grasp the outer ear and wiggle it to help the solution enter the canal.    Lean over a sink or basin with the blocked ear facing downward. Use a bulb syringe filled with warm (not hot or cold) water to rinse the ear several times. Use gentle pressure only.    If you are having trouble draining the water out of your ear canal, put a few drops of rubbing alcohol (isopropyl alcohol) into the ear canal. This will help remove the remaining water.    Repeat this procedure once a day for up to three days, or until your hearing is back to normal. Do not use this treatment for more than three days in a row.  Don ts    Don t use cold water to rinse the ear. This will make you dizzy.    Don t perform this procedure if you have an ear infection.    Don t perform this procedure if you have a ruptured eardrum.    Don t use cotton swabs, matches, hairpins, keys, or other objects to  clean  the ear canal. This can cause infection of the ear canal or rupture the eardrum. Because of their size and shape, cotton swabs can push earwax deeper into the ear canal instead of removing it.  Follow-up care  Follow up with your health care provider if you are not improving after three cleaning attempts, or as  advised.  When to seek medical advice  Call your health care provider right away if any of these occur:    Worsening ear pain    Fever of 101 F (38.3 C) or higher, or as directed by your health care provider    Hearing does not return to normal after three days of treatment    Fluid drainage or bleeding from the ear canal    Swelling, redness, or tenderness of the outer ear    Headache, neck pain, or stiff neck    2864-2225 [employer]. [address] [place] [address], [place] [id number]. All rights reserved. This information is not intended as a substitute for professional medical care. Always follow your healthcare professional's instructions.

## 2022-03-21 NOTE — PROGRESS NOTES
Assessment & Plan     Impacted cerumen of left ear    - WA REMOVAL IMPACTED CERUMEN IRRIGATION/LVG UNILAT     PROCEDURE:  Cerumen removal done in left ear via lavage done by MA and supervised by myself    Patient reports dizziness after ear lavage which resolved within 5 minutes and he reports immediate improvement in bilateral ears after lavage. Left ear canal mildly erythematous and TM normal after lavage.    Follow-up with PCP if symptoms persist for 7 days, and sooner if symptoms worsen or new symptoms develop.     Discussed red flag symptoms which warrant immediate visit in emergency room    All questions were answered and patient verbalized understanding. AVS reviewed with patient.     Rocio Roldan, DNP, APRN, CNP 3/21/2022 1:54 PM  [facility]          Tasneem Mcduffie is a 81 year old male who presents to clinic today with his wife for the following health issues:  Chief Complaint   Patient presents with     Ear Problem     happened three days ago-- said ears popped and were okay for a while then started up agian, ears are plugged. hard to hear, no pain      Patient presents for evaluation of plugged ears bilaterally. Associated symptoms; decreased hearing. Denies pain, cough, nasal congestion. Symptoms have been present 3 days. Nothing tried for symptoms.     Problem list, Medication list, Allergies, and Medical history reviewed in EPIC.    ROS:  Review of systems negative except for noted above        Objective    /80   Pulse 83   Temp 98.2  F (36.8  C)   Wt 67.5 kg (148 lb 12.8 oz)   SpO2 97%   BMI 22.62 kg/m    Physical Exam  Constitutional:       General: He is not in acute distress.     Appearance: He is not toxic-appearing or diaphoretic.   HENT:      Head: Normocephalic and atraumatic.      Right Ear: Tympanic membrane, ear canal and external ear normal.      Left Ear: External ear normal. There is impacted cerumen.      Nose: Nose normal.      Mouth/Throat:       Mouth: Mucous membranes are moist.      Pharynx: Oropharynx is clear. No oropharyngeal exudate or posterior oropharyngeal erythema.   Eyes:      Conjunctiva/sclera: Conjunctivae normal.   Lymphadenopathy:      Cervical: No cervical adenopathy.   Neurological:      Mental Status: He is alert.

## 2022-05-24 ASSESSMENT — TONOMETRY
OD_IOP_MMHG: 18
OS_IOP_MMHG: 18

## 2022-05-24 ASSESSMENT — VISUAL ACUITY
OS_CC: 20/30-2
OD_CC: 20/40-2

## 2022-06-08 ENCOUNTER — TRANSFERRED RECORDS (OUTPATIENT)
Dept: HEALTH INFORMATION MANAGEMENT | Facility: CLINIC | Age: 82
End: 2022-06-08
Payer: COMMERCIAL

## 2022-06-14 ENCOUNTER — COMPLETE EYE EXAM (OUTPATIENT)
Dept: URBAN - METROPOLITAN AREA CLINIC 98 | Facility: CLINIC | Age: 82
End: 2022-06-14

## 2022-06-14 DIAGNOSIS — Z79.4: ICD-10-CM

## 2022-06-14 DIAGNOSIS — E11.3212: ICD-10-CM

## 2022-06-14 DIAGNOSIS — H35.3131: ICD-10-CM

## 2022-06-14 DIAGNOSIS — H35.371: ICD-10-CM

## 2022-06-14 DIAGNOSIS — E11.3291: ICD-10-CM

## 2022-06-14 PROCEDURE — 92014 COMPRE OPH EXAM EST PT 1/>: CPT

## 2022-06-14 PROCEDURE — 92134 CPTRZ OPH DX IMG PST SGM RTA: CPT

## 2022-06-14 PROCEDURE — 92202 OPSCPY EXTND ON/MAC DRAW: CPT

## 2022-06-14 ASSESSMENT — TONOMETRY
OD_IOP_MMHG: 11
OS_IOP_MMHG: 10

## 2022-06-14 ASSESSMENT — VISUAL ACUITY
OD_SC: 20/40
OS_SC: 20/30-2

## 2022-09-14 ENCOUNTER — OFFICE VISIT (OUTPATIENT)
Dept: URGENT CARE | Facility: URGENT CARE | Age: 82
End: 2022-09-14
Payer: COMMERCIAL

## 2022-09-14 VITALS
DIASTOLIC BLOOD PRESSURE: 82 MMHG | WEIGHT: 150.6 LBS | BODY MASS INDEX: 22.9 KG/M2 | OXYGEN SATURATION: 97 % | TEMPERATURE: 98 F | HEART RATE: 84 BPM | SYSTOLIC BLOOD PRESSURE: 145 MMHG

## 2022-09-14 DIAGNOSIS — M25.462 EFFUSION OF LEFT KNEE: Primary | ICD-10-CM

## 2022-09-14 PROCEDURE — 99213 OFFICE O/P EST LOW 20 MIN: CPT | Performed by: INTERNAL MEDICINE

## 2022-09-14 RX ORDER — MEMANTINE HYDROCHLORIDE 5 MG/1
1 TABLET ORAL 2 TIMES DAILY
COMMUNITY
Start: 2022-08-26
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
## 2022-09-17 ENCOUNTER — OFFICE VISIT (OUTPATIENT)
Dept: ORTHOPEDICS | Facility: CLINIC | Age: 82
End: 2022-09-17
Attending: INTERNAL MEDICINE
Payer: COMMERCIAL

## 2022-09-17 VITALS — SYSTOLIC BLOOD PRESSURE: 142 MMHG | DIASTOLIC BLOOD PRESSURE: 83 MMHG | WEIGHT: 150.6 LBS | BODY MASS INDEX: 22.9 KG/M2

## 2022-09-17 DIAGNOSIS — S89.92XA INJURY OF LEFT KNEE, INITIAL ENCOUNTER: Primary | ICD-10-CM

## 2022-09-17 DIAGNOSIS — M25.462 SWELLING OF JOINT OF LEFT KNEE: ICD-10-CM

## 2022-09-17 PROCEDURE — 99203 OFFICE O/P NEW LOW 30 MIN: CPT | Performed by: PEDIATRICS

## 2022-09-17 ASSESSMENT — PAIN SCALES - GENERAL: PAINLEVEL: SEVERE PAIN (6)
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
## 2022-09-17 NOTE — PATIENT INSTRUCTIONS
We discussed these other possible diagnosis: Likely hematoma/bursitis, otherwise reassuring exam and x-rays.    Reviewed pathophysiology and natural course of this condition.  Discussed signs, symptoms and risks of infection.  Reviewed limited utility of aspiration. Avoid direct irritation and use compressive sleeve or ACE wrap as needed.    Plan:  - Today's Plan of Care:  Continue with relative rest and activity modification, Ice, Compression, and Elevation.  Can apply ice 10-15 minutes 3-4 times per day as needed. OTC medications as needed.  Compression  Home Exercise Program    Reviewed risks/benefits of possible aspiration. Patient will consider - referral for US guided aspiration if not improving in 1-2 weeks pending patient preference and symptoms.    -We also discussed other future treatment options:  MRI if not improving  Referral to formal physical therapy    Follow Up: 1-2 weeks, aspiration if needed    If you have any further questions for your physician or physician s care team you can call [phone] and use option 3 to leave a voice message.

## 2022-09-20 ENCOUNTER — OFFICE VISIT (OUTPATIENT)
Dept: ORTHOPEDICS | Facility: CLINIC | Age: 82
End: 2022-09-20
Payer: COMMERCIAL

## 2022-09-20 DIAGNOSIS — M25.462 SWELLING OF JOINT OF LEFT KNEE: ICD-10-CM

## 2022-09-20 DIAGNOSIS — S89.92XA INJURY OF LEFT KNEE, INITIAL ENCOUNTER: ICD-10-CM

## 2022-09-20 DIAGNOSIS — M70.42 PREPATELLAR BURSITIS OF LEFT KNEE: Primary | ICD-10-CM

## 2022-09-20 PROCEDURE — 20611 DRAIN/INJ JOINT/BURSA W/US: CPT | Mod: LT | Performed by: FAMILY MEDICINE

## 2022-09-20 NOTE — PROGRESS NOTES
Large Joint Injection/Arthocentesis: L patellar bursa    Date/Time: 9/20/2022 11:45 AM  Performed by: Kyle Mayen MD  Authorized by: Kyle Mayen MD     Indications:  Pain  Needle Size:  18 G  Guidance: ultrasound    Approach:  Superolateral  Location:  Knee      Aspirate amount (mL):  200  Aspirate:  Bloody  Outcome:  Tolerated well, no immediate complications  Procedure discussed: discussed risks, benefits, and alternatives    Consent Given by:  Patient  Timeout: timeout called immediately prior to procedure    Prep: patient was prepped and draped in usual sterile fashion     Ultrasound images of procedure were permanently stored.   Referred by Dr. Shafer     Patient reported someimprovement of pain after aspiration of left prepatellar hematoma aspiratoin.  Ultrasound guided images were permanently stored.  Aftercare instructions given to patient.  Plan to follow-up in two weeks for repeat evaluation.    Kyle Mayen MD [facility]

## 2022-09-20 NOTE — PATIENT INSTRUCTIONS
[facility] Injection Discharge Instructions    Procedure: Left prepatella bursa aspiration    Follow-up: keep knee wrapped in ACE wrap. Follow-up in two weeks for repeat evaluation    You may shower, however avoid swimming, tub baths or hot tubs for 72 hours following your procedure  You may have a mild to moderate increase in pain for several days following the injection.  You may use ice packs for 10-15 minutes, 3 to 4 times a day at the injection site for comfort  You may use anti-inflammatory medications (such as Ibuprofen or Aleve or Advil) or Tylenol for pain control if necessary    If you experience any of the following, call [facility] @ [phone] or [phone]  -Fever over 100 degree F  -Swelling, bleeding, redness, drainage, warmth at the injection site  - New or worsening pain    It was great seeing you today!    Kyle Mayen

## 2022-09-20 NOTE — LETTER
9/20/2022         RE: Froy Loredo  [address] [address] [address]        Dear Colleague,    Thank you for referring your patient, Froy Loredo, to the [facility]. Please see a copy of my visit note below.    Large Joint Injection/Arthocentesis: L patellar bursa    Date/Time: 9/20/2022 11:45 AM  Performed by: Kyle Mayen MD  Authorized by: Kyle Mayen MD     Indications:  Pain  Needle Size:  18 G  Guidance: ultrasound    Approach:  Superolateral  Location:  Knee      Aspirate amount (mL):  200  Aspirate:  Bloody  Outcome:  Tolerated well, no immediate complications  Procedure discussed: discussed risks, benefits, and alternatives    Consent Given by:  Patient  Timeout: timeout called immediately prior to procedure    Prep: patient was prepped and draped in usual sterile fashion     Ultrasound images of procedure were permanently stored.   Referred by Dr. Shafer     Patient reported someimprovement of pain after aspiration of left prepatellar hematoma aspiratoin.  Ultrasound guided images were permanently stored.  Aftercare instructions given to patient.  Plan to follow-up in two weeks for repeat evaluation.    Kyle Mayen MD [facility]              Again, thank you for allowing me to participate in the care of your patient.        Sincerely,        Kyle Mayen MD

## 2022-09-30 ENCOUNTER — NURSE TRIAGE (OUTPATIENT)
Dept: NURSING | Facility: CLINIC | Age: 82
End: 2022-09-30

## 2022-09-30 ENCOUNTER — TELEPHONE (OUTPATIENT)
Dept: ORTHOPEDICS | Facility: CLINIC | Age: 82
End: 2022-09-30

## 2022-09-30 DIAGNOSIS — M25.462 SWELLING OF JOINT OF LEFT KNEE: Primary | ICD-10-CM

## 2022-09-30 DIAGNOSIS — S89.92XA INJURY OF LEFT KNEE, INITIAL ENCOUNTER: ICD-10-CM

## 2022-09-30 NOTE — TELEPHONE ENCOUNTER
Patients wife called stating  Froy has fluid build up again in knee and is in a lot of discomfort.  Ortho scheduling told patient he should be seen today.  Please contact wife, Radha, to discuss appointment.

## 2022-09-30 NOTE — TELEPHONE ENCOUNTER
Called and spoke with patient.  He states he currently does not have any questions.  He asked if he left a message. I relayed that his wife had left us a message wanting to discuss the returned swelling.  He transferred the phone to his wife.  She states the swelling looks similar to prior to the aspiration.  Denies any fever, chills, or new injury.  He does have an appointment with Dr. Mayen next week but she feels this is too far away.     I explained that I would pass this message along to Dr. Shafer and Dr. Mayen for their recommendations.  She expressed understanding.    Annette Whelan ATC

## 2022-09-30 NOTE — TELEPHONE ENCOUNTER
"Caller is pt's wife (Radha).  Authorized rep on pt's chart and currently with pt.    Pt had fluid aspirated from L knee 9/20/2022.  Diagnosis of prepatellar bursitis.  Procedure done at [facility].    \"Knee is now swollen up again.\"  \"Like jiggly jelly as before.\"  \"Feels uncomfortable, but not painful.\"  Still able to ambulate as usual.  Using ice packs as advised.  Taking ibuprofen intermittently, not daily.    Due to recurrence of prior level of swelling, triage disposition indicates eval within 3 days.  Pt has next appt Oct 8th (too far away).  Therefore now conference-calling Ortho scheduling line ....  Ortho  finds open appt slots at [facility]'s same-day schedule.  Ortho  is therefore now warm-transferring pt's wife (Radha) to the same-day [facility] scheduling desk.    Haritha [facility] Nurse Advisor     Reason for Disposition    Fluid-filled sack just below knee cap (i.e., inferior aspect of the anterior knee)    Additional Information    Negative: Followed a knee injury    Negative: Followed a bee sting and has localized swelling (e.g., small area of puffy or swollen skin)    Negative: Followed an insect bite and has localized swelling (e.g., small area of puffy or swollen skin)    Negative: Knee pain is main symptom    Negative: Swelling of calf or leg is main symptom    Negative: Knee pain and fever    Negative: Knee redness and fever    Negative: Patient sounds very sick or weak to the triager    Negative: SEVERE pain (e.g., excruciating, unable to walk) and not improved after 2 hours of pain medicine    Negative: Redness and painful when touched and no fever    Negative: Red area or streak > 2 inches (or 5 cm)    Negative: Thigh or calf pain and only 1 side and present > 1 hour    Negative: Thigh or calf swelling and only 1 side    Negative: SEVERE swelling (e.g., can't move swollen knee at all)    Negative: Looks like a boil, infected sore, deep ulcer or " other infected rash (spreading redness, pus)    Negative: Redness of the skin and no fever    Negative: Patient wants to be seen    Negative: MILD or MODERATE swelling (e.g., can't move joint normally, can't do usual activities) (Exceptions: Itchy, localized swelling; swelling is chronic.)    Protocols used: KNEE SWELLING-A-OH

## 2022-09-30 NOTE — TELEPHONE ENCOUNTER
Called and spoke with patient's wife, Radha. Relayed message and plan per Dr. Shafer.  She expressed understanding.  They will plan to keep appointment with Dr. Mayen 10/8/22 to review the results.    Annette Whelan ATC

## 2022-09-30 NOTE — TELEPHONE ENCOUNTER
Reviewed with ATC and Dr. Mayen.    Recommend MRI of the knee to evaluate for occult fracture or other injury given recurrent swelling and bloody joint fluid previously aspirated.    This is ordered, please instruct patient how to schedule.    Would continue compression, ice, elevation in the interim.    Follow up with Dr. Mayen as scheduled 10/8/2022, hopefully can arrange MRI before then (can send to [facility] if needed).    Please review concerning signs and symptoms concerning for infection that would warrant more urgent evaluation.    Let me know if additional questions.  Reema Shafer MD

## 2022-10-07 ENCOUNTER — ANCILLARY PROCEDURE (OUTPATIENT)
Dept: MRI IMAGING | Facility: CLINIC | Age: 82
End: 2022-10-07
Attending: PEDIATRICS
Payer: COMMERCIAL

## 2022-10-07 DIAGNOSIS — M25.462 SWELLING OF JOINT OF LEFT KNEE: ICD-10-CM

## 2022-10-07 DIAGNOSIS — S89.92XA INJURY OF LEFT KNEE, INITIAL ENCOUNTER: ICD-10-CM

## 2022-10-07 PROCEDURE — 73721 MRI JNT OF LWR EXTRE W/O DYE: CPT | Mod: TC | Performed by: RADIOLOGY

## 2022-10-11 ENCOUNTER — OFFICE VISIT (OUTPATIENT)
Dept: ORTHOPEDICS | Facility: CLINIC | Age: 82
End: 2022-10-11
Payer: COMMERCIAL

## 2022-10-11 VITALS
HEART RATE: 93 BPM | DIASTOLIC BLOOD PRESSURE: 80 MMHG | WEIGHT: 150 LBS | SYSTOLIC BLOOD PRESSURE: 143 MMHG | BODY MASS INDEX: 22.81 KG/M2

## 2022-10-11 DIAGNOSIS — T14.8XXA MOREL LAVALLEE LESION: Primary | ICD-10-CM

## 2022-10-11 PROCEDURE — 99213 OFFICE O/P EST LOW 20 MIN: CPT | Performed by: FAMILY MEDICINE
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
## 2022-12-13 ENCOUNTER — FOLLOW UP (OUTPATIENT)
Dept: URBAN - METROPOLITAN AREA CLINIC 98 | Facility: CLINIC | Age: 82
End: 2022-12-13

## 2022-12-13 DIAGNOSIS — E11.3291: ICD-10-CM

## 2022-12-13 DIAGNOSIS — Z79.4: ICD-10-CM

## 2022-12-13 DIAGNOSIS — H35.371: ICD-10-CM

## 2022-12-13 DIAGNOSIS — H35.3131: ICD-10-CM

## 2022-12-13 DIAGNOSIS — E11.3212: ICD-10-CM

## 2022-12-13 PROCEDURE — 92014 COMPRE OPH EXAM EST PT 1/>: CPT

## 2022-12-13 PROCEDURE — 92202 OPSCPY EXTND ON/MAC DRAW: CPT

## 2022-12-13 PROCEDURE — 92134 CPTRZ OPH DX IMG PST SGM RTA: CPT

## 2022-12-13 ASSESSMENT — TONOMETRY
OS_IOP_MMHG: 15
OD_IOP_MMHG: 16

## 2022-12-13 ASSESSMENT — VISUAL ACUITY
OS_CC: 20/25-2
OD_PH: 20/60-2
OD_CC: 20/150+1

## 2023-04-29 ENCOUNTER — OFFICE VISIT (OUTPATIENT)
Dept: URGENT CARE | Facility: URGENT CARE | Age: 83
End: 2023-04-29
Payer: COMMERCIAL

## 2023-04-29 ENCOUNTER — ANCILLARY PROCEDURE (OUTPATIENT)
Dept: GENERAL RADIOLOGY | Facility: CLINIC | Age: 83
End: 2023-04-29
Payer: COMMERCIAL

## 2023-04-29 VITALS
TEMPERATURE: 98.4 F | WEIGHT: 150 LBS | BODY MASS INDEX: 22.81 KG/M2 | RESPIRATION RATE: 18 BRPM | HEART RATE: 57 BPM | SYSTOLIC BLOOD PRESSURE: 161 MMHG | OXYGEN SATURATION: 98 % | DIASTOLIC BLOOD PRESSURE: 67 MMHG

## 2023-04-29 DIAGNOSIS — M54.41 ACUTE RIGHT-SIDED LOW BACK PAIN WITH RIGHT-SIDED SCIATICA: Primary | ICD-10-CM

## 2023-04-29 PROCEDURE — 72100 X-RAY EXAM L-S SPINE 2/3 VWS: CPT | Mod: TC | Performed by: RADIOLOGY

## 2023-04-29 PROCEDURE — 99213 OFFICE O/P EST LOW 20 MIN: CPT

## 2023-04-29 ASSESSMENT — PAIN SCALES - GENERAL: PAINLEVEL: NO PAIN (0)

## 2023-04-29 NOTE — PATIENT INSTRUCTIONS
Low back x-ray shows mild levocurvature centered at L3, grade 1 anterolisthesis at L4-5, mild retrolisthesis L2-L3, 4 mm; minor retrolisthesis L1-L2 and L5-S1, 3 mm, advanced L2-L3, moderate L4-L5 and L5-S1 interbody degeneration and advanced lower lumbar L4-5 and L5-S1 facet hypertrophy per the radiologist report.  You can continue to use ice/heat to the area for pain.  Use 4% lidocaine patch for pain.  Massage to the area as well for pain.  Follow up with orthopedics for further evaluation and treatment.

## 2023-04-29 NOTE — PROGRESS NOTES
ASSESSMENT:  (M54.41) Acute right-sided low back pain with right-sided sciatica  (primary encounter diagnosis)  Plan: XR Lumbar Spine 2/3 Views, Orthopedic         Referral    PLAN:  Informed the patient that the low back x-ray shows mild levocurvature centered at L3, grade 1 anterior listhesis at L4-5, mild retrolisthesis at L2-L3, 4 mm; minor retrolisthesis L1-L2 and L5-S1, 3 mm, advanced L2-L3, moderate L4-L5 and L5-S1 interbody degeneration and advanced lower lumbar L4-L5 and L5-S1 facet hypertrophy per the radiologist report.  We discussed continuing to use ice/heat to the area for pain.  We also discussed using 4% lidocaine patch for pain.  In addition informed the patient to use massage to the area as well and the need to follow-up with orthopedics for further evaluation and treatment.  Patient acknowledged his understanding of the above plan.    The use of [employer] services may have been used to construct the content in this note; any grammatical or spelling errors are non-intentional. Please contact the author of this note directly if you are in need of any clarification.      Isaiah Walsh, CHE CNP    SUBJECTIVE:  Froy Loredo is a 82 year old male seen in clinic today for evaluation of back pain.   Symptoms have beening going on for 3 days.    Pain is located in the low back right region, with radiation to right hip, and are at worst a 9 on a scale of 1-10.  Recent injury:none recalled by the patient  Measures which improve the pain include heat, ice and Tylenol.  Measures which worsen the pain include sitting, changing position and walking  Personal hx of back pain is no prior back problems.  There is no history of lower extremity numbness/weakness or change in bowel/bladder control.    ROS:  Negative except noted above.     OBJECTIVE:  Blood pressure (!) 161/67, pulse 57, temperature 98.4  F (36.9  C), temperature source Tympanic, resp. rate 18, weight 68 kg (150  lb), SpO2 98 %.  GENERAL APPEARANCE: healthy, alert and no distress  MSK:  Lumbar:  Inspection: No obvious deformity, erythema, edema, or ecchymosis of the thoracic or lumbar spine.   Palpation: Non-tender with palpation along the lumbar spine and surrounding musculature.   Sensation: grossly intact throughout bilateral upper and lower extremities   Range of Motion:  lumbar flexion  full, lumbar extension  decreased, painful, left lateral lumbar rotation  full, right lateral lumbar rotation  full  Negative straight leg raises. Non-tender internal and external rotation of the hips. 2+ DTR s, lower extremity CMS intact.  Gait stiff.  NEURO: Normal strength and tone with no weakness or sensory deficit noted, reflexes normal   SKIN: no suspicious lesions or rashes

## 2023-05-01 NOTE — TELEPHONE ENCOUNTER
RECORDS RECEIVED FROM: Internal   REASON FOR VISIT: Acute right-sided low back pain with right-sided sciatica   Date of Appt: 5/3/23   NOTES (FOR ALL VISITS) STATUS DETAILS   OFFICE NOTE from referring provider Internal 4/29/23 Isaiah Walsh APRN CNP @ [facility]     OFFICE NOTE from other specialist Internal 1/16/20 Froy Gonzales MD @ [facility]   MEDICATION LIST Internal    IMAGING  (FOR ALL VISITS)     X-RAY Internal [facility]  4/29/23 XR Lumbar Spine

## 2023-05-03 ENCOUNTER — OFFICE VISIT (OUTPATIENT)
Dept: NEUROSURGERY | Facility: CLINIC | Age: 83
End: 2023-05-03
Payer: COMMERCIAL

## 2023-05-03 ENCOUNTER — PRE VISIT (OUTPATIENT)
Dept: NEUROSURGERY | Facility: CLINIC | Age: 83
End: 2023-05-03

## 2023-05-03 ENCOUNTER — ANCILLARY PROCEDURE (OUTPATIENT)
Dept: GENERAL RADIOLOGY | Facility: CLINIC | Age: 83
End: 2023-05-03
Attending: PHYSICIAN ASSISTANT
Payer: COMMERCIAL

## 2023-05-03 VITALS
SYSTOLIC BLOOD PRESSURE: 133 MMHG | HEART RATE: 55 BPM | OXYGEN SATURATION: 97 % | WEIGHT: 150 LBS | BODY MASS INDEX: 22.81 KG/M2 | DIASTOLIC BLOOD PRESSURE: 83 MMHG

## 2023-05-03 DIAGNOSIS — M54.41 ACUTE RIGHT-SIDED LOW BACK PAIN WITH RIGHT-SIDED SCIATICA: Primary | ICD-10-CM

## 2023-05-03 DIAGNOSIS — M54.41 ACUTE RIGHT-SIDED LOW BACK PAIN WITH RIGHT-SIDED SCIATICA: ICD-10-CM

## 2023-05-03 DIAGNOSIS — R29.898 IMPAIRED FLEXIBILITY OF LOWER EXTREMITY: ICD-10-CM

## 2023-05-03 DIAGNOSIS — M46.1 SACROILIITIS (H): ICD-10-CM

## 2023-05-03 DIAGNOSIS — S22.000A COMPRESSION FRACTURE OF THORACIC VERTEBRA, UNSPECIFIED THORACIC VERTEBRAL LEVEL, INITIAL ENCOUNTER (H): Primary | ICD-10-CM

## 2023-05-03 LAB — RADIOLOGIST FLAGS: ABNORMAL

## 2023-05-03 PROCEDURE — 72082 X-RAY EXAM ENTIRE SPI 2/3 VW: CPT | Mod: 52 | Performed by: SURGERY

## 2023-05-03 PROCEDURE — 99203 OFFICE O/P NEW LOW 30 MIN: CPT | Performed by: PHYSICIAN ASSISTANT

## 2023-05-03 PROCEDURE — 77073 BONE LENGTH STUDIES: CPT | Mod: 52 | Performed by: SURGERY

## 2023-05-03 ASSESSMENT — PAIN SCALES - GENERAL: PAINLEVEL: EXTREME PAIN (8)

## 2023-05-03 NOTE — PATIENT INSTRUCTIONS
PT referral placed - they will call you to schedule in the next couple of days.   Follow up with me in 4-6 weeks after starting PT.      Order for lumbar MRI placed.  Follow up virtually with me 2-3 days after the imaging has been completed to discuss the findings. This can be virtual.      Use ibuprofen and tylenol as needed for pain.  Try Voltaren gel or other topicals to see if this helps with pain.      EOS XR - get this on your way out today.

## 2023-05-03 NOTE — PROGRESS NOTES
Neurosurgery Clinic Note    Chief Complaint: right buttock pain    History of Present Illness:  It was a pleasure to evaluate Froy Loredo in clinic today at the kind referral of CHE Dow CNP  [address] [place], [place] [id number].    Froy Loredo is a 82 year old male presenting with acute right low back/buttock pain.  He states this started about 1 week ago.  It is centralized in his right buttock area as a deep ache.  The pain extends down the lateral side of his leg and ends at the knee level.  When he stands the pain becomes severe and sharp.  He is unable to stand upright for long.  He denies similar issues on the right side or misha weakness in his BLE. He denies changes in bowel/bladder.  Patient has had this issue in the remote past.  He has not had any prior surgeries and denies recent injury.  He is currently using a wheelchair for his appointment today.  He otherwise has been using a walking stick.  He has not been mobilizing or walking any significant distances.      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    Left superior upper chest,Left inferior chest,Left mid back       Past Surgical History:   Procedure Laterality Date     BIOPSY       COLONOSCOPY       HC MOHS ANY STAGE EA ADDTL BLOCK AFTER FIRST 5       ORTHOPEDIC SURGERY       ZZC NONSPECIFIC PROCEDURE  5/25/94    quadriceps tear-right     ZZC NONSPECIFIC PROCEDURE      bcc excision left chest       Social History     Socioeconomic History     Marital status:      Spouse name: Radha     Number of children: 2  "  Occupational History     Occupation: Real estate appraisal     Employer: RETIRED     Comment: [place] [employer]     Employer: RETIRED   Tobacco Use     Smoking status: Never     Smokeless tobacco: Never     Tobacco comments:     Lives in smoke free household   Vaping Use     Vaping status: Never Used   Substance and Sexual Activity     Alcohol use: No     Alcohol/week: 0.0 standard drinks of alcohol     Drug use: No     Sexual activity: Yes     Partners: Female     Birth control/protection: None   Other Topics Concern     Parent/sibling w/ CABG, MI or angioplasty before 65F 55M? No      Service Yes     Blood Transfusions No     Caffeine Concern No     Occupational Exposure No     Hobby Hazards No     Sleep Concern Yes     Comment: [employer]     Stress Concern No     Weight Concern No     Special Diet No     Back Care No     Exercise Yes     Bike Helmet No     Seat Belt Yes     Self-Exams No       family history includes Diabetes in his maternal grandfather; Other Cancer in an other family member.       IMAGING   Imaging independently reviewed.    EOS 5/3/23 - poor study and unable to visualize the full sagittal spine view.      XR Lumbar Spine 2/3 Views 4/29/2023 - mild scoliotic lumbar curvature, L4/5 grade 1 anterolisthesis, L2/3 mild retrolisthesis and significant disc height loss (this has increased from 2015 study).        FINDINGS: Nomenclature based on 5 lumbar type vertebral bodies. Mild levocurvature centered at L3. Grade 1 anterolisthesis at L4-5. Mild retrolisthesis L2-L3, 4 mm; minor retrolisthesis L1-L2 and L5-S1, 3 mm. Normal height of vertebral bodies. Advanced L2-L3, moderate L4-L5 and L5-S1 interbody degeneration. Advanced lower lumbar L4-5 and L5-S1 facet hypertrophy. Unremarkable visualized bony pelvis. Aortic calcification.      Nutritional Status:  Estimated body mass index is 22.81 kg/m  as calculated from the following:    Height as of 3/12/22: 1.727 m (5' 8\").    Weight as of 4/29/23: 68 kg " (150 lb).     Physical Exam   There were no vitals taken for this visit.    Constitutional: Appears well-developed and well-nourished. Cooperative. No acute distress.   HENT:   Head: Normocephalic and atraumatic.   Eyes: Conjunctivae are normal.  Neck: Neck supple. No tracheal deviation present.  Cardiovascular: Normal rate and regular rhythm.    Pulmonary/Chest: Effort normal and breath sounds normal.  Abdominal: Exhibits no obvious distension.   Musculoskeletal: Able to move all extremities.  No involuntary motor movements. No C/T/L spine tenderness to palpation.  +right SI joint TTP. ALICIA w/ right buttock/hip px; Internal/External hip rotation with right buttock/hip px.  Negative TTP GT bursa.  Straight leg test negative bilaterally.   Lumbar flexion/extension range of motion: unable to test b/c of instability with balance.  Does c/o pain in right buttock with standing upright, better if leaning over.  Skin: Skin is warm, dry and intact.   Psychiatric: Normal mood and affect. Speech is normal and behavior is normal.    Neurological:  Alert, NAD, and oriented to person, place, and time.   No cranial nerve deficit   Gait: antalgic favor right, using walking stick.      Strength (L/R)  5/5 Hip Flexion - pain limited on the right  5/5 Knee Extension  5/5 Ankle Dorsiflexion  5/5 Extensor Hallucis Longus  5/5 Plantar Flexion    Reflexes (L/R)  1+/1+ Patellar  1+/1+ Ankle    No ankle clonus    Sensation: SILT       ASSESSMENT:  Froy Loredo is a 82 year old male with unprovoked onset of pain in his right buttock area that extends into the lateral thigh for past week.  This is worse with standing and better with leaning over.  He has had a similar remote incident of this in the past.  He has had minimal conservative treatment.  He has only a lumbar XR today which shows degenerative changes including an L4/5 slip and L2/3 significant disc height loss and slip.  His SI joints do look to be arthritic.  In reviewing a CT  from 2015, these features have progressed slightly.      Given patient's pain, I did offer to have him try a right SI joint injection to see if that would help him participate in PT.  He has elected at this point to move forward with PT.  If he wishes to pursue the injection, he will contact me.      I have also ordered an EOS XR so I can get a better look at his overall spinal alignment with him in an upright position.  I would also like to get a lumbar MRI to verify the pain in his right leg is not from a compressed nerve.      PLAN:    The patient will require standing long-cassette xrays with full limb evaluation on EOS machine to evaluate sagittal and coronal spinal balance.    Lumbar MRI w/o - okay to get when convenient for patient.  Follow up with me virtually in 2-3 days after the study has been completed.     PT referral placed - I did place this for acute spine referral.  Work on stretching and activity tolerance per diagnoses.  Follow up with me in 4-6 weeks after starting PT.  If his pain interferes with his ability to participate in PT, he will let me know--could try right SI joint injection, get lumbar MRI      Recommend using OTC analgesics, try topicals such as Voltaren, ice/heat.      I spent 40 minutes spent in patient care, independent review and interpretation of medical records/imaging, reviewing old records.     Patient and wife agree with the plan and had no additional questions and this time.        Marija Cloud PA-C  Department of Neurosurgery  Office: [phone]

## 2023-05-03 NOTE — LETTER
5/3/2023       RE: Froy Loredo  [address] [facility] [address]       Dear Colleague,    Thank you for referring your patient, Froy Loredo, to the [facility] NEUROSURGERY CLINIC [place] at [facility]. Please see a copy of my visit note below.        Neurosurgery Clinic Note    Chief Complaint: right buttock pain    History of Present Illness:  It was a pleasure to evaluate Froy Loredo in clinic today at the kind referral of Isaiah Walsh, CHE CNP  [address] [place] [id number].    Froy Loredo is a 82 year old male presenting with acute right low back/buttock pain.  He states this started about 1 week ago.  It is centralized in his right buttock area as a deep ache.  The pain extends down the lateral side of his leg and ends at the knee level.  When he stands the pain becomes severe and sharp.  He is unable to stand upright for long.  He denies similar issues on the right side or misha weakness in his BLE. He denies changes in bowel/bladder.  Patient has had this issue in the remote past.  He has not had any prior surgeries and denies recent injury.  He is currently using a wheelchair for his appointment today.  He otherwise has been using a walking stick.  He has not been mobilizing or walking any significant distances.      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    Left superior upper chest,Left inferior chest,Left mid back       Past Surgical History:   Procedure Laterality Date    BIOPSY      COLONOSCOPY      HC MOHS ANY  STAGE EA ADDTL BLOCK AFTER FIRST 5      ORTHOPEDIC SURGERY      ZZC NONSPECIFIC PROCEDURE  5/25/94    quadriceps tear-right    ZZC NONSPECIFIC PROCEDURE      bcc excision left chest       Social History     Socioeconomic History    Marital status:      Spouse name: Radha    Number of children: 2   Occupational History    Occupation: Real estate appraisal     Employer: RETIRED     Comment: [place] [employer]     Employer: RETIRED   Tobacco Use    Smoking status: Never    Smokeless tobacco: Never    Tobacco comments:     Lives in smoke free household   Vaping Use    Vaping status: Never Used   Substance and Sexual Activity    Alcohol use: No     Alcohol/week: 0.0 standard drinks of alcohol    Drug use: No    Sexual activity: Yes     Partners: Female     Birth control/protection: None   Other Topics Concern    Parent/sibling w/ CABG, MI or angioplasty before 65F 55M? No     Service Yes    Blood Transfusions No    Caffeine Concern No    Occupational Exposure No    Hobby Hazards No    Sleep Concern Yes     Comment: [employer]    Stress Concern No    Weight Concern No    Special Diet No    Back Care No    Exercise Yes    Bike Helmet No    Seat Belt Yes    Self-Exams No       family history includes Diabetes in his maternal grandfather; Other Cancer in an other family member.       IMAGING   Imaging independently reviewed.    EOS 5/3/23 - poor study and unable to visualize the full sagittal spine view.      XR Lumbar Spine 2/3 Views 4/29/2023 - mild scoliotic lumbar curvature, L4/5 grade 1 anterolisthesis, L2/3 mild retrolisthesis and significant disc height loss (this has increased from 2015 study).        FINDINGS: Nomenclature based on 5 lumbar type vertebral bodies. Mild levocurvature centered at L3. Grade 1 anterolisthesis at L4-5. Mild retrolisthesis L2-L3, 4 mm; minor retrolisthesis L1-L2 and L5-S1, 3 mm. Normal height of vertebral bodies. Advanced L2-L3, moderate L4-L5 and L5-S1 interbody degeneration. Advanced  "lower lumbar L4-5 and L5-S1 facet hypertrophy. Unremarkable visualized bony pelvis. Aortic calcification.      Nutritional Status:  Estimated body mass index is 22.81 kg/m  as calculated from the following:    Height as of 3/12/22: 1.727 m (5' 8\").    Weight as of 4/29/23: 68 kg (150 lb).     Physical Exam   There were no vitals taken for this visit.    Constitutional: Appears well-developed and well-nourished. Cooperative. No acute distress.   HENT:   Head: Normocephalic and atraumatic.   Eyes: Conjunctivae are normal.  Neck: Neck supple. No tracheal deviation present.  Cardiovascular: Normal rate and regular rhythm.    Pulmonary/Chest: Effort normal and breath sounds normal.  Abdominal: Exhibits no obvious distension.   Musculoskeletal: Able to move all extremities.  No involuntary motor movements. No C/T/L spine tenderness to palpation.  +right SI joint TTP. ALICIA w/ right buttock/hip px; Internal/External hip rotation with right buttock/hip px.  Negative TTP GT bursa.  Straight leg test negative bilaterally.   Lumbar flexion/extension range of motion: unable to test b/c of instability with balance.  Does c/o pain in right buttock with standing upright, better if leaning over.  Skin: Skin is warm, dry and intact.   Psychiatric: Normal mood and affect. Speech is normal and behavior is normal.    Neurological:  Alert, NAD, and oriented to person, place, and time.   No cranial nerve deficit   Gait: antalgic favor right, using walking stick.      Strength (L/R)  5/5 Hip Flexion - pain limited on the right  5/5 Knee Extension  5/5 Ankle Dorsiflexion  5/5 Extensor Hallucis Longus  5/5 Plantar Flexion    Reflexes (L/R)  1+/1+ Patellar  1+/1+ Ankle    No ankle clonus    Sensation: SILT       ASSESSMENT:  Froy Loredo is a 82 year old male with unprovoked onset of pain in his right buttock area that extends into the lateral thigh for past week.  This is worse with standing and better with leaning over.  He has had a " similar remote incident of this in the past.  He has had minimal conservative treatment.  He has only a lumbar XR today which shows degenerative changes including an L4/5 slip and L2/3 significant disc height loss and slip.  His SI joints do look to be arthritic.  In reviewing a CT from 2015, these features have progressed slightly.      Given patient's pain, I did offer to have him try a right SI joint injection to see if that would help him participate in PT.  He has elected at this point to move forward with PT.  If he wishes to pursue the injection, he will contact me.      I have also ordered an EOS XR so I can get a better look at his overall spinal alignment with him in an upright position.  I would also like to get a lumbar MRI to verify the pain in his right leg is not from a compressed nerve.      PLAN:    The patient will require standing long-cassette xrays with full limb evaluation on EOS machine to evaluate sagittal and coronal spinal balance.    Lumbar MRI w/o - okay to get when convenient for patient.  Follow up with me virtually in 2-3 days after the study has been completed.     PT referral placed - I did place this for acute spine referral.  Work on stretching and activity tolerance per diagnoses.  Follow up with me in 4-6 weeks after starting PT.  If his pain interferes with his ability to participate in PT, he will let me know--could try right SI joint injection, get lumbar MRI      Recommend using OTC analgesics, try topicals such as Voltaren, ice/heat.      I spent 40 minutes spent in patient care, independent review and interpretation of medical records/imaging, reviewing old records.     Patient and wife agree with the plan and had no additional questions and this time.            Again, thank you for allowing me to participate in the care of your patient.      Sincerely,    Marija Cloud PA-C

## 2023-05-04 ENCOUNTER — NURSE TRIAGE (OUTPATIENT)
Dept: NURSING | Facility: CLINIC | Age: 83
End: 2023-05-04
Payer: COMMERCIAL

## 2023-05-04 NOTE — PROGRESS NOTES
Received report for the EOS XR done after patient visit today:    Impression:  Limited evaluation due to difficulty with positioning and patient  motion as well as diffuse osseous demineralization.     1. Mild S-shaped scoliotic curvature of the thoracolumbar/lumbar  spine.   2. Very positive global sagittal imbalance.  3. Weight bearing axis as detailed above.  4. Question age-indeterminate compression fracture deformities of the  mid thoracic spine, not well evaluated on this exam. Recommend  correlation to prior imaging and with point tenderness.    Placed order to have him get a CT thoracic to get a better look at the questionable, age-indeterminate compression fractures/deformities in mid thoracic spine.  He denied pain higher than his buttock area during my visit.  Thus, I if these are compression deformities, the likelihood they are acute are unlikely.  Bone health referral will be considered based on the CT imaging findings.     Marija Cloud PA-C  Department of Neurosurgery  Office: [phone]

## 2023-05-04 NOTE — TELEPHONE ENCOUNTER
Patient gave permission to speak to wife.    Patient is having severe hip pain that radiates to his back.  Wife states they were told to take Ibuprofen and use ice and heat.  They have been doing that.  The ice and heat are helpful, but patient isnt getting any relief from the ibuprofen.    Patient isnt able to get into PT til 5/15 in [place].  Wife states patients pain is so severe and needs something to help him get through his day.    At this time patient is able to find a comfortable position and sleep.    Patient is requesting stronger pain medication to take during the day.    Will route this to clinic to follow up.    Nikky Spencer RN   05/04/23 6:50 PM  [facility] Nurse Advisor    Additional Information    Negative: Looks like a broken bone or dislocated joint (e.g., crooked or deformed)    Negative: Sounds like a life-threatening emergency to the triager    Negative: Followed a hip injury    Negative: Leg pain is main symptom    Negative: Back pain radiating (shooting) into hip    Negative: [1] SEVERE pain (e.g., excruciating, unable to do any normal activities) AND [2] fever    Negative: Can't stand (bear weight) or walk    Negative: [1] Red area or streak AND [2] fever    Negative: Patient sounds very sick or weak to the triager    Protocols used: HIP PAIN-A-AH

## 2023-05-05 ENCOUNTER — TELEPHONE (OUTPATIENT)
Dept: NEUROSURGERY | Facility: CLINIC | Age: 83
End: 2023-05-05
Payer: COMMERCIAL

## 2023-05-05 DIAGNOSIS — S22.000A COMPRESSION FRACTURE OF THORACIC VERTEBRA, UNSPECIFIED THORACIC VERTEBRAL LEVEL, INITIAL ENCOUNTER (H): ICD-10-CM

## 2023-05-05 DIAGNOSIS — M54.41 ACUTE RIGHT-SIDED LOW BACK PAIN WITH RIGHT-SIDED SCIATICA: Primary | ICD-10-CM

## 2023-05-05 DIAGNOSIS — M46.1 SACROILIITIS (H): ICD-10-CM

## 2023-05-05 DIAGNOSIS — M46.1 SACROILIITIS (H): Primary | ICD-10-CM

## 2023-05-05 DIAGNOSIS — M54.41 ACUTE RIGHT-SIDED LOW BACK PAIN WITH RIGHT-SIDED SCIATICA: ICD-10-CM

## 2023-05-05 RX ORDER — METHYLPREDNISOLONE 4 MG
TABLET, DOSE PACK ORAL
Qty: 21 TABLET | Refills: 0 | Status: SHIPPED | OUTPATIENT
Start: 2023-05-05

## 2023-05-05 NOTE — CONFIDENTIAL NOTE
"Message by patient:    \"Acute right sided low back pain with impaired flexibility, 8/10 pain level, Ibuprofen is not helping, Pt's wife called a urgent care yesterday and spoke to a Triage nurse, did not recommend any pain solution, recommended Pt go back to doctor originally assisting with this issue. Wife not sure if he needs a strong pain medication or to go to urgent care, would like direction.    Pt's wife states she does think the pain has gotten worse since Wednesday as he is having trouble even walking with the can now, he cannot walk more than 6 feet without needing to stop.\"    Left VM for patient/wife to call me back.  Will want to get CT thoracic, lumbar.  I had asked nurse coordinate Gabriel Turner to contact him to set thoracic CT up after XR result w/ possible thoracic compression fractures was found.  I do not know status of that request.      Consider script for Medrol DP to see if this helps with pain.  Waiting for call back.      Marija Cloud PA-C  Department of Neurosurgery  Office: [phone]    "

## 2023-05-05 NOTE — TELEPHONE ENCOUNTER
Writer called Marija Cloud PA-C. Patient requested call back. Provider offered Medrol dose pack and for patient to go to urgent care or ED if pain is unmanageable.      Writer called patient spouse to let her know the above information from the provider. Patient spouse would like the Medrol dose pack called to the patient's pharmacy and will seek out care from urgent care over the weekend if needed.     Writer sent message to PA to write script for Medrol Dose Pack.     Alia Karimi LPN  Neurosurgery

## 2023-05-05 NOTE — TELEPHONE ENCOUNTER
[facility] Call Center    Phone Message    May a detailed message be left on voicemail: yes     Reason for Call: Symptoms or Concerns     If patient has red-flag symptoms, warm transfer to triage line    Current symptom or concern: Acute right sided low back pain with impaired flexibility, 8/10 pain level, Ibuprofen is not helping, Pt's wife called a urgent care yesterday and spoke to a Triage nurse, did not recommend any pain solution, recommended Pt go back to doctor originally assisting with this issue. Wife not sure if he needs a strong pain medication or to go to urgent care, would like direction.    Symptoms have been present for:  2 day(s)    Has patient previously been seen for this? Yes    By :     Date: 5/5/23    Are there any new or worsening symptoms? Yes: Pt's wife states she does think the pain has gotten worse since Wednesday as he is having trouble even walking with the can now, he cannot walk more than 6 feet without needing to stop      Action Taken: Message routed to:  [facility] Neurosurgery    Travel Screening: Not Applicable

## 2023-05-05 NOTE — TELEPHONE ENCOUNTER
Writer received message via Teams and returned call to patient spouse. Patient is not going to the ED and spouse would like to speak with provider. Marija Cloud PA-C. Writer routed to provider and called provider.     Alia Karimi LPN  Neurosurgery

## 2023-05-08 ENCOUNTER — TELEPHONE (OUTPATIENT)
Dept: NEUROSURGERY | Facility: CLINIC | Age: 83
End: 2023-05-08
Payer: COMMERCIAL

## 2023-05-08 NOTE — CONFIDENTIAL NOTE
Provider left VM to check on patient after prescribing MD pack over weekend for pain back/buttock.      Had to leave VM for patient to call back.    Marija Cloud PA-C  Department of Neurosurgery  Office: [phone]

## 2023-05-09 ENCOUNTER — TELEPHONE (OUTPATIENT)
Dept: NEUROSURGERY | Facility: CLINIC | Age: 83
End: 2023-05-09
Payer: COMMERCIAL

## 2023-05-10 ENCOUNTER — TELEPHONE (OUTPATIENT)
Dept: NEUROSURGERY | Facility: CLINIC | Age: 83
End: 2023-05-10
Payer: COMMERCIAL

## 2023-05-30 NOTE — PATIENT INSTRUCTIONS
Froy to follow up with Primary Care provider regarding elevated blood pressure.   Cimzia Pregnancy And Lactation Text: This medication crosses the placenta but can be considered safe in certain situations. Cimzia may be excreted in breast milk.

## 2023-05-31 ENCOUNTER — TELEPHONE (OUTPATIENT)
Dept: NEUROSURGERY | Facility: CLINIC | Age: 83
End: 2023-05-31
Payer: COMMERCIAL

## 2023-06-01 ENCOUNTER — TELEPHONE (OUTPATIENT)
Dept: NEUROSURGERY | Facility: CLINIC | Age: 83
End: 2023-06-01
Payer: COMMERCIAL

## 2023-06-01 ENCOUNTER — TRANSFERRED RECORDS (OUTPATIENT)
Dept: HEALTH INFORMATION MANAGEMENT | Facility: CLINIC | Age: 83
End: 2023-06-01
Payer: COMMERCIAL

## 2023-06-13 ENCOUNTER — FOLLOW UP (OUTPATIENT)
Dept: URBAN - METROPOLITAN AREA CLINIC 98 | Facility: CLINIC | Age: 83
End: 2023-06-13

## 2023-06-13 DIAGNOSIS — H43.813: ICD-10-CM

## 2023-06-13 DIAGNOSIS — E11.3291: ICD-10-CM

## 2023-06-13 DIAGNOSIS — H35.3131: ICD-10-CM

## 2023-06-13 DIAGNOSIS — E11.3212: ICD-10-CM

## 2023-06-13 DIAGNOSIS — H35.371: ICD-10-CM

## 2023-06-13 PROCEDURE — 92014 COMPRE OPH EXAM EST PT 1/>: CPT

## 2023-06-13 PROCEDURE — 92202 OPSCPY EXTND ON/MAC DRAW: CPT

## 2023-06-13 PROCEDURE — 92134 CPTRZ OPH DX IMG PST SGM RTA: CPT

## 2023-06-13 ASSESSMENT — VISUAL ACUITY
OS_CC: 20/20-2
OD_CC: 20/40-2

## 2023-06-13 ASSESSMENT — TONOMETRY
OS_IOP_MMHG: 11
OD_IOP_MMHG: 13

## 2023-07-14 ENCOUNTER — TELEPHONE (OUTPATIENT)
Dept: NEUROSURGERY | Facility: CLINIC | Age: 83
End: 2023-07-14
Payer: COMMERCIAL

## 2023-09-26 ENCOUNTER — TELEPHONE (OUTPATIENT)
Dept: FAMILY MEDICINE | Facility: CLINIC | Age: 83
End: 2023-09-26
Payer: COMMERCIAL

## 2023-09-26 NOTE — LETTER
September 26, 2023      Froy Basurtoahan  [address] [facility] [address]    Your team at [facility] cares about your health. We have reviewed your chart and based on our findings; we are making the following recommendations to better manage your health.     You are in particular need of attention regarding the following:     Schedule a DIABETIC EYE EXAM.  This exam is done with an optometrist, annually. You can schedule this appointment with your eye doctor.  If you need a referral, please let us know.    PREVENTATIVE VISIT: Schedule an Annual Medicare Wellness Exam. Please call your [facility] clinic to set up your appointment.    If you have already completed these items, please contact the clinic via phone or   MyChart so your care team can review and update your records. Thank you for   choosing [facility] Clinics for your healthcare needs. For any questions,   concerns, or to schedule an appointment please contact our clinic.    Healthy Regards,      Your [facility] Care Team

## 2023-09-26 NOTE — TELEPHONE ENCOUNTER
Patient Quality Outreach    Patient is due for the following:   Diabetes -  Eye Exam  Physical Annual Wellness Visit      Topic Date Due    Zoster (Shingles) Vaccine (2 of 3) 06/17/2016    COVID-19 Vaccine (6 - Pfizer series) 02/16/2023    Flu Vaccine (1) 09/01/2023       Next Steps:   Schedule a office visit for eye exam & Annual Wellness Visit    Type of outreach:    Sent letter.      Questions for provider review:    None           Leann Sim

## 2023-10-31 ENCOUNTER — OFFICE VISIT (OUTPATIENT)
Dept: OPTOMETRY | Facility: CLINIC | Age: 83
End: 2023-10-31
Payer: COMMERCIAL

## 2023-10-31 DIAGNOSIS — H25.13 NUCLEAR SCLEROTIC CATARACT OF BOTH EYES: ICD-10-CM

## 2023-10-31 DIAGNOSIS — H52.03 HYPEROPIA, BILATERAL: ICD-10-CM

## 2023-10-31 DIAGNOSIS — H52.223 REGULAR ASTIGMATISM OF BOTH EYES: ICD-10-CM

## 2023-10-31 DIAGNOSIS — Z01.01 VISION EXAM WITH ABNORMAL FINDINGS: Primary | ICD-10-CM

## 2023-10-31 DIAGNOSIS — H52.4 PRESBYOPIA: ICD-10-CM

## 2023-10-31 PROCEDURE — 92004 COMPRE OPH EXAM NEW PT 1/>: CPT | Performed by: OPTOMETRIST

## 2023-10-31 PROCEDURE — 92015 DETERMINE REFRACTIVE STATE: CPT | Performed by: OPTOMETRIST

## 2023-10-31 ASSESSMENT — REFRACTION_WEARINGRX
OD_SPHERE: +2.25
OS_AXIS: 005
OD_AXIS: 180
OS_SPHERE: +1.50
OD_ADD: +2.50
OS_ADD: +2.50
SPECS_TYPE: PAL
OS_CYLINDER: +1.50
OD_CYLINDER: +1.75

## 2023-10-31 ASSESSMENT — VISUAL ACUITY
METHOD: SNELLEN - LINEAR
OS_CC+: -1
OD_CC: 20/30
OS_CC: 20/40
OS_CC: 20/30-2
OD_CC: 20/25
OD_CC+: -3
CORRECTION_TYPE: GLASSES

## 2023-10-31 ASSESSMENT — REFRACTION_MANIFEST
OD_AXIS: 180
OS_AXIS: 005
OD_SPHERE: +1.75
OS_SPHERE: +1.50
OD_CYLINDER: +2.00
OD_AXIS: 006
OS_SPHERE: +0.75
OS_CYLINDER: +1.50
OD_CYLINDER: +1.75
OD_ADD: +2.50
OS_CYLINDER: +2.50
OS_AXIS: 007
OD_SPHERE: +2.00
OS_ADD: +2.50

## 2023-10-31 ASSESSMENT — CONF VISUAL FIELD
OS_SUPERIOR_NASAL_RESTRICTION: 0
OD_INFERIOR_TEMPORAL_RESTRICTION: 0
OD_SUPERIOR_TEMPORAL_RESTRICTION: 0
OS_NORMAL: 1
OD_INFERIOR_NASAL_RESTRICTION: 0
OS_INFERIOR_NASAL_RESTRICTION: 0
OS_SUPERIOR_TEMPORAL_RESTRICTION: 0
OD_NORMAL: 1
OS_INFERIOR_TEMPORAL_RESTRICTION: 0
OD_SUPERIOR_NASAL_RESTRICTION: 0

## 2023-10-31 ASSESSMENT — TONOMETRY
OS_IOP_MMHG: 17
OD_IOP_MMHG: 17
IOP_METHOD: APPLANATION

## 2023-10-31 ASSESSMENT — KERATOMETRY
OD_K2POWER_DIOPTERS: 41.50
OD_K1POWER_DIOPTERS: 42.25
OS_AXISANGLE2_DEGREES: 177
OS_K1POWER_DIOPTERS: 42.50
OD_AXISANGLE2_DEGREES: 15
OS_K2POWER_DIOPTERS: 41.75

## 2023-10-31 ASSESSMENT — SLIT LAMP EXAM - LIDS
COMMENTS: NORMAL
COMMENTS: NORMAL

## 2023-10-31 ASSESSMENT — CUP TO DISC RATIO
OD_RATIO: 0.2
OS_RATIO: 0.2

## 2023-10-31 ASSESSMENT — EXTERNAL EXAM - LEFT EYE: OS_EXAM: NORMAL

## 2023-10-31 ASSESSMENT — EXTERNAL EXAM - RIGHT EYE: OD_EXAM: NORMAL

## 2023-10-31 NOTE — PROGRESS NOTES
Chief Complaint   Patient presents with    COMPREHENSIVE EYE EXAM      Accompanied by wife, Radha   Last Eye Exam: 11/14/20182  Dilated Previously: Yes    What are you currently using to see?  glasses       Distance Vision Acuity: Satisfied with vision, seems about the same     Near Vision Acuity: Satisfied with vision while reading and using computer with glasses, seems fine, per patient     Eye Comfort: good  Do you use eye drops? : Yes: As needed, has Systane   Occupation or Hobbies: Retired     [employer]           Medical, surgical and family histories reviewed and updated 10/31/2023.       OBJECTIVE: See Ophthalmology exam    ASSESSMENT:    ICD-10-CM    1. Vision exam with abnormal findings  Z01.01       2. Hyperopia, bilateral  H52.03       3. Regular astigmatism of both eyes  H52.223       4. Presbyopia  H52.4       5. Nuclear sclerotic cataract of both eyes  H25.13           PLAN:     There are no Patient Instructions on file for this visit.

## 2023-10-31 NOTE — LETTER
10/31/2023         RE: Froy Loredo  [address] [facility] [address]        Dear Colleague,    Thank you for referring your patient, Froy Loredo, to the [facility]. Please see a copy of my visit note below.    Chief Complaint   Patient presents with     COMPREHENSIVE EYE EXAM      Accompanied by wife, Radha   Last Eye Exam: 11/14/20182  Dilated Previously: Yes    What are you currently using to see?  glasses       Distance Vision Acuity: Satisfied with vision, seems about the same     Near Vision Acuity: Satisfied with vision while reading and using computer with glasses, seems fine, per patient     Eye Comfort: good  Do you use eye drops? : Yes: As needed, has Systane   Occupation or Hobbies: Retired     [employer] Assistant           Medical, surgical and family histories reviewed and updated 10/31/2023.       OBJECTIVE: See Ophthalmology exam    ASSESSMENT:    ICD-10-CM    1. Vision exam with abnormal findings  Z01.01       2. Hyperopia, bilateral  H52.03       3. Regular astigmatism of both eyes  H52.223       4. Presbyopia  H52.4       5. Nuclear sclerotic cataract of both eyes  H25.13           PLAN:     There are no Patient Instructions on file for this visit.       Again, thank you for allowing me to participate in the care of your patient.        Sincerely,        Leann Jones, OD

## 2023-10-31 NOTE — PATIENT INSTRUCTIONS
Fill glasses prescription  Allow 2 weeks to adapt to change in glasses  Monitor mild cataracts yearly   Return in 1 year for eye exam    Leann Jones O.D.  [facility]  [address] [place] [address] [phone]

## 2023-12-05 ENCOUNTER — TRANSFERRED RECORDS (OUTPATIENT)
Dept: HEALTH INFORMATION MANAGEMENT | Facility: CLINIC | Age: 83
End: 2023-12-05
Payer: COMMERCIAL

## 2023-12-27 ENCOUNTER — FOLLOW UP (OUTPATIENT)
Dept: URBAN - METROPOLITAN AREA CLINIC 98 | Facility: CLINIC | Age: 83
End: 2023-12-27

## 2023-12-27 DIAGNOSIS — H35.3131: ICD-10-CM

## 2023-12-27 DIAGNOSIS — E11.3291: ICD-10-CM

## 2023-12-27 DIAGNOSIS — Z79.4: ICD-10-CM

## 2023-12-27 DIAGNOSIS — H35.371: ICD-10-CM

## 2023-12-27 DIAGNOSIS — E11.3212: ICD-10-CM

## 2023-12-27 PROCEDURE — 92202 OPSCPY EXTND ON/MAC DRAW: CPT

## 2023-12-27 PROCEDURE — 92134 CPTRZ OPH DX IMG PST SGM RTA: CPT

## 2023-12-27 PROCEDURE — 92014 COMPRE OPH EXAM EST PT 1/>: CPT

## 2023-12-27 ASSESSMENT — VISUAL ACUITY
OS_CC: 20/40-1
OD_CC: 20/40-1

## 2023-12-27 ASSESSMENT — TONOMETRY
OD_IOP_MMHG: 13
OS_IOP_MMHG: 13

## 2024-06-19 ENCOUNTER — FOLLOW UP (OUTPATIENT)
Dept: URBAN - METROPOLITAN AREA CLINIC 98 | Facility: CLINIC | Age: 84
End: 2024-06-19

## 2024-06-19 DIAGNOSIS — H35.3131: ICD-10-CM

## 2024-06-19 DIAGNOSIS — E11.3291: ICD-10-CM

## 2024-06-19 DIAGNOSIS — Z79.4: ICD-10-CM

## 2024-06-19 DIAGNOSIS — H35.371: ICD-10-CM

## 2024-06-19 DIAGNOSIS — E11.3212: ICD-10-CM

## 2024-06-19 PROCEDURE — 92014 COMPRE OPH EXAM EST PT 1/>: CPT

## 2024-06-19 PROCEDURE — 92202 OPSCPY EXTND ON/MAC DRAW: CPT

## 2024-06-19 PROCEDURE — 92134 CPTRZ OPH DX IMG PST SGM RTA: CPT

## 2024-06-19 ASSESSMENT — TONOMETRY
OD_IOP_MMHG: 11
OS_IOP_MMHG: 10

## 2024-06-19 ASSESSMENT — VISUAL ACUITY
OS_CC: 20/40+1
OD_CC: 20/30-2

## 2024-07-22 ENCOUNTER — ANCILLARY PROCEDURE (OUTPATIENT)
Dept: GENERAL RADIOLOGY | Facility: CLINIC | Age: 84
End: 2024-07-22
Attending: ORTHOPAEDIC SURGERY
Payer: COMMERCIAL

## 2024-07-22 ENCOUNTER — OFFICE VISIT (OUTPATIENT)
Dept: ORTHOPEDICS | Facility: CLINIC | Age: 84
End: 2024-07-22
Payer: COMMERCIAL

## 2024-07-22 VITALS
WEIGHT: 142 LBS | SYSTOLIC BLOOD PRESSURE: 132 MMHG | HEART RATE: 80 BPM | HEIGHT: 66 IN | RESPIRATION RATE: 18 BRPM | DIASTOLIC BLOOD PRESSURE: 81 MMHG | BODY MASS INDEX: 22.82 KG/M2

## 2024-07-22 DIAGNOSIS — G89.29 CHRONIC PAIN OF RIGHT KNEE: ICD-10-CM

## 2024-07-22 DIAGNOSIS — M25.561 CHRONIC PAIN OF RIGHT KNEE: ICD-10-CM

## 2024-07-22 DIAGNOSIS — M25.562 CHRONIC PAIN OF LEFT KNEE: Primary | ICD-10-CM

## 2024-07-22 DIAGNOSIS — G89.29 CHRONIC PAIN OF LEFT KNEE: Primary | ICD-10-CM

## 2024-07-22 DIAGNOSIS — M76.62 ACHILLES TENDINITIS OF LEFT LOWER EXTREMITY: ICD-10-CM

## 2024-07-22 PROCEDURE — 73562 X-RAY EXAM OF KNEE 3: CPT | Mod: TC | Performed by: RADIOLOGY

## 2024-07-22 PROCEDURE — 99024 POSTOP FOLLOW-UP VISIT: CPT | Performed by: ORTHOPAEDIC SURGERY

## 2024-07-22 ASSESSMENT — PAIN SCALES - GENERAL: PAINLEVEL: MILD PAIN (3)

## 2024-07-22 NOTE — LETTER
7/22/2024      Froy Loredo  [address] [id number]      Dear Colleague,    Thank you for referring your patient, Froy Loredo, to the [facility]. Please see a copy of my visit note below.    Froy Loredo is a 83 year old male who is seen as self referral for left knee pain.  He has had this off and on for several years.  He gets worse with much walking.  Better with rest.  He has occasional pain rated 3 out of 10.  He feels the pain mainly anteriorly over the left knee.  He has noted some swelling.  He does not feel pain at the hip.  He has had occasional pain down toward the ankle.    X-ray of the left knee shows very slight spurring of the knee joint spaces are very well-preserved.  Previous x-ray of the lumbar spine showing the hips show bone-on-bone on the lateral aspect of the left hip.      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 No family hx of      Cancer No family hx of      Hypertension No family hx of      Cerebrovascular Disease No family hx of      Thyroid Disease No family hx of      Glaucoma No family hx of      Macular Degeneration No family hx of      Breast Cancer  No family hx of      Prostate Cancer No family hx of      Depression No family hx of      Anxiety Disorder No family hx of      Mental Illness No family hx of      Substance Abuse No family hx of      Anesthesia Reaction No family hx of      Asthma No family hx of      Osteoporosis No family hx of      Genetic Disorder No family hx of      Obesity No family hx of      Unknown/Adopted No family hx of        Social History     Socioeconomic History     Marital status:      Spouse name: Radha     Number of children: 2     Years of education: Not on file     Highest education level: Not on file   Occupational History     Occupation: Real estate appraisal     Employer: RETIRED     Comment: [place] [employer]     Employer: RETIRED   Tobacco Use     Smoking status: Never     Smokeless tobacco: Never     Tobacco comments:     Lives in smoke free household   Vaping Use     Vaping status: Never Used   Substance and Sexual Activity     Alcohol use: No     Alcohol/week: 0.0 standard drinks of alcohol     Drug use: No     Sexual activity: Yes     Partners: Female     Birth control/protection: None   Other Topics Concern     Parent/sibling w/ CABG, MI or angioplasty before 65F 55M? No      Service Yes     Blood Transfusions No     Caffeine Concern No     Occupational Exposure No     Hobby Hazards No     Sleep Concern Yes     Comment: [employer]     Stress Concern No     Weight Concern No     Special Diet No     Back Care No     Exercise Yes     Bike Helmet No     Seat Belt Yes     Self-Exams No   Social History Narrative     Not on file     Social Determinants of Health     Financial Resource Strain: Not on file   Food Insecurity: Not on file   Transportation Needs: Not on file   Physical Activity: Not on file   Stress: Not on file   Social Connections: Unknown (5/16/2023)    [facility] [employer] [facility]    Social Connections      Frequency of  Communication with Friends and Family: Not on file   Interpersonal Safety: Not on file   Housing Stability: Not on file       Current Outpatient Medications   Medication Sig Dispense Refill     albuterol (PROAIR HFA/PROVENTIL HFA/VENTOLIN HFA) 108 (90 Base) MCG/ACT inhaler Inhale 2 puffs into the lungs every 4 hours as needed for shortness of breath / dyspnea or wheezing (Patient not taking: Reported on 9/14/2022) 2 Inhaler 3     cholecalciferol (VITAMIN D3) 125 mcg (5000 units) capsule Take 125 mcg by mouth daily (Patient not taking: Reported on 4/29/2023)       finasteride (PROSCAR) 5 MG tablet Take 1 tablet (5 mg) by mouth daily (Patient not taking: Reported on 9/14/2022) 90 tablet 1     galantamine (RAZADYNE ER) 8 MG 24 hr capsule Take 8 mg by mouth daily (with breakfast)       memantine (NAMENDA) 5 MG tablet 1 tablet 2 times daily       methylPREDNISolone (MEDROL DOSEPAK) 4 MG tablet therapy pack Follow Package Directions 21 tablet 0     polyethylene glycol 0.4%- propylene glycol 0.3% (SYSTANE ULTRA) 0.4-0.3 % SOLN ophthalmic solution Place 1 drop into both eyes 2 times daily       sulindac (CLINORIL) 200 MG tablet Take 1 tablet (200 mg) by mouth 2 times daily (Patient not taking: Reported on 9/14/2022) 60 tablet 11     tamsulosin (FLOMAX) 0.4 MG capsule TAKE 2 CAPSULES BY MOUTH EVERY DAY (Patient not taking: Reported on 9/14/2022) 60 capsule 0     VITAMIN C OR 2 TABLETS DAILY (Patient not taking: Reported on 9/14/2022)         Allergies   Allergen Reactions     Doxazosin      Lightheadedness 2013, resolved with switch to tamsulosin        REVIEW OF SYSTEMS:  CONSTITUTIONAL:  NEGATIVE for fever, chills, change in weight, not feeling tired  SKIN:  NEGATIVE for worrisome rashes, no skin lumps, no skin ulcers and no non-healing wounds  EYES:  NEGATIVE for vision changes or irritation.  ENT/MOUTH:  NEGATIVE.  No hearing loss, no hoarseness, no difficulty swallowing.  RESP:  NEGATIVE. No cough or shortness of  "breath.  CV:  NEGATIVE for chest pain, palpitations or peripheral edema  GI:  NEGATIVE for nausea, abdominal pain, heartburn, or change in bowel habits  :  Negative. No dysuria, no hematuria  MUSCULOSKELETAL:  See HPI above  NEURO:  NEGATIVE . No headaches, no dizziness,  no numbness  ENDOCRINE:  NEGATIVE for temperature intolerance, skin/hair changes  HEME/ALLERGY/IMMUNE:  NEGATIVE for bleeding problems  PSYCHIATRIC:  NEGATIVE. no anxiety, no depression.     Exam:  Vitals: /81   Pulse 80   Resp 18   Ht 1.676 m (5' 6\")   Wt 64.4 kg (142 lb)   BMI 22.92 kg/m    BMI= Body mass index is 22.92 kg/m .  Constitutional:  healthy, alert and no distress  Neuro: Alert and Oriented x 3, no focal defects.  Psych: Affect normal   Respiratory: Breathing not labored.  Cardiovascular: normal peripheral pulses  Lymph: no adenopathy  Skin: No rashes,worrisome lesions or skin problems  He has no pain with range of motion of the hips.  There is no tenderness over the greater trochanters.  He has significant crepitation on the right knee.  He has a incision from quadriceps tendon repair on the right knee.  There is no crepitation on the left knee.  He has full extension of both knees and flexion to about 120 degrees.  No ligamentous laxity of either knee.  He does have trace effusion on the left knee.  He has good stability of the ankle.  He has good motion of the ankles.  He does have tenderness of the left Achilles, normal on right.  Sensation, motor and circulation are intact.      Assessment:  left knee pain without significant arthritis.  2.  Left Achilles tendinitis.    Plan: We have gone over stretching and eccentric strengthening of the Achilles.  Symptomatic treatment of pain.    Again, thank you for allowing me to participate in the care of your patient.        Sincerely,        Carlos Drake MD  "

## 2024-07-23 NOTE — PROGRESS NOTES
Froy Loredo is a 83 year old male who is seen as self referral for left knee pain.  He has had this off and on for several years.  He gets worse with much walking.  Better with rest.  He has occasional pain rated 3 out of 10.  He feels the pain mainly anteriorly over the left knee.  He has noted some swelling.  He does not feel pain at the hip.  He has had occasional pain down toward the ankle.    X-ray of the left knee shows very slight spurring of the knee joint spaces are very well-preserved.  Previous x-ray of the lumbar spine showing the hips show bone-on-bone on the lateral aspect of the left hip.      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 No family hx of     Cancer No family hx of     Hypertension No family hx of     Cerebrovascular Disease No family hx of     Thyroid Disease No family hx of     Glaucoma No family hx of     Macular Degeneration No family hx of     Breast Cancer No family hx of     Prostate Cancer No family hx of     Depression No family hx of     Anxiety Disorder No family hx of     Mental Illness No family hx of     Substance Abuse No family hx of     Anesthesia Reaction No family hx of     Asthma No family hx of      Osteoporosis No family hx of     Genetic Disorder No family hx of     Obesity No family hx of     Unknown/Adopted No family hx of        Social History     Socioeconomic History    Marital status:      Spouse name: Radha    Number of children: 2    Years of education: Not on file    Highest education level: Not on file   Occupational History    Occupation: Real estate appraisal     Employer: RETIRED     Comment: [place] [employer]     Employer: RETIRED   Tobacco Use    Smoking status: Never    Smokeless tobacco: Never    Tobacco comments:     Lives in smoke free household   Vaping Use    Vaping status: Never Used   Substance and Sexual Activity    Alcohol use: No     Alcohol/week: 0.0 standard drinks of alcohol    Drug use: No    Sexual activity: Yes     Partners: Female     Birth control/protection: None   Other Topics Concern    Parent/sibling w/ CABG, MI or angioplasty before 65F 55M? No     Service Yes    Blood Transfusions No    Caffeine Concern No    Occupational Exposure No    Hobby Hazards No    Sleep Concern Yes     Comment: [employer]    Stress Concern No    Weight Concern No    Special Diet No    Back Care No    Exercise Yes    Bike Helmet No    Seat Belt Yes    Self-Exams No   Social History Narrative    Not on file     Social Determinants of Health     Financial Resource Strain: Not on file   Food Insecurity: Not on file   Transportation Needs: Not on file   Physical Activity: Not on file   Stress: Not on file   Social Connections: Unknown (5/16/2023)    [facility]    Social Connections     Frequency of Communication with Friends and Family: Not on file   Interpersonal Safety: Not on file   Housing Stability: Not on file       Current Outpatient Medications   Medication Sig Dispense Refill    albuterol (PROAIR HFA/PROVENTIL HFA/VENTOLIN HFA) 108 (90 Base) MCG/ACT inhaler Inhale 2 puffs into the lungs every 4  hours as needed for shortness of breath / dyspnea or wheezing (Patient not taking: Reported on 9/14/2022) 2 Inhaler 3    cholecalciferol (VITAMIN D3) 125 mcg (5000 units) capsule Take 125 mcg by mouth daily (Patient not taking: Reported on 4/29/2023)      finasteride (PROSCAR) 5 MG tablet Take 1 tablet (5 mg) by mouth daily (Patient not taking: Reported on 9/14/2022) 90 tablet 1    galantamine (RAZADYNE ER) 8 MG 24 hr capsule Take 8 mg by mouth daily (with breakfast)      memantine (NAMENDA) 5 MG tablet 1 tablet 2 times daily      methylPREDNISolone (MEDROL DOSEPAK) 4 MG tablet therapy pack Follow Package Directions 21 tablet 0    polyethylene glycol 0.4%- propylene glycol 0.3% (SYSTANE ULTRA) 0.4-0.3 % SOLN ophthalmic solution Place 1 drop into both eyes 2 times daily      sulindac (CLINORIL) 200 MG tablet Take 1 tablet (200 mg) by mouth 2 times daily (Patient not taking: Reported on 9/14/2022) 60 tablet 11    tamsulosin (FLOMAX) 0.4 MG capsule TAKE 2 CAPSULES BY MOUTH EVERY DAY (Patient not taking: Reported on 9/14/2022) 60 capsule 0    VITAMIN C OR 2 TABLETS DAILY (Patient not taking: Reported on 9/14/2022)         Allergies   Allergen Reactions    Doxazosin      Lightheadedness 2013, resolved with switch to tamsulosin        REVIEW OF SYSTEMS:  CONSTITUTIONAL:  NEGATIVE for fever, chills, change in weight, not feeling tired  SKIN:  NEGATIVE for worrisome rashes, no skin lumps, no skin ulcers and no non-healing wounds  EYES:  NEGATIVE for vision changes or irritation.  ENT/MOUTH:  NEGATIVE.  No hearing loss, no hoarseness, no difficulty swallowing.  RESP:  NEGATIVE. No cough or shortness of breath.  CV:  NEGATIVE for chest pain, palpitations or peripheral edema  GI:  NEGATIVE for nausea, abdominal pain, heartburn, or change in bowel habits  :  Negative. No dysuria, no hematuria  MUSCULOSKELETAL:  See HPI above  NEURO:  NEGATIVE . No headaches, no dizziness,  no numbness  ENDOCRINE:  NEGATIVE for temperature  "intolerance, skin/hair changes  HEME/ALLERGY/IMMUNE:  NEGATIVE for bleeding problems  PSYCHIATRIC:  NEGATIVE. no anxiety, no depression.     Exam:  Vitals: /81   Pulse 80   Resp 18   Ht 1.676 m (5' 6\")   Wt 64.4 kg (142 lb)   BMI 22.92 kg/m    BMI= Body mass index is 22.92 kg/m .  Constitutional:  healthy, alert and no distress  Neuro: Alert and Oriented x 3, no focal defects.  Psych: Affect normal   Respiratory: Breathing not labored.  Cardiovascular: normal peripheral pulses  Lymph: no adenopathy  Skin: No rashes,worrisome lesions or skin problems  He has no pain with range of motion of the hips.  There is no tenderness over the greater trochanters.  He has significant crepitation on the right knee.  He has a incision from quadriceps tendon repair on the right knee.  There is no crepitation on the left knee.  He has full extension of both knees and flexion to about 120 degrees.  No ligamentous laxity of either knee.  He does have trace effusion on the left knee.  He has good stability of the ankle.  He has good motion of the ankles.  He does have tenderness of the left Achilles, normal on right.  Sensation, motor and circulation are intact.      Assessment:  left knee pain without significant arthritis.  2.  Left Achilles tendinitis.    Plan: We have gone over stretching and eccentric strengthening of the Achilles.  Symptomatic treatment of pain.  "

## 2024-12-11 ENCOUNTER — FOLLOW UP (OUTPATIENT)
Dept: URBAN - METROPOLITAN AREA CLINIC 98 | Facility: CLINIC | Age: 84
End: 2024-12-11

## 2024-12-11 DIAGNOSIS — E11.3212: ICD-10-CM

## 2024-12-11 DIAGNOSIS — Z79.4: ICD-10-CM

## 2024-12-11 DIAGNOSIS — H35.3131: ICD-10-CM

## 2024-12-11 DIAGNOSIS — H35.371: ICD-10-CM

## 2024-12-11 DIAGNOSIS — E11.3291: ICD-10-CM

## 2024-12-11 PROCEDURE — 92014 COMPRE OPH EXAM EST PT 1/>: CPT

## 2024-12-11 PROCEDURE — 92134 CPTRZ OPH DX IMG PST SGM RTA: CPT

## 2024-12-11 PROCEDURE — 92202 OPSCPY EXTND ON/MAC DRAW: CPT

## 2024-12-11 ASSESSMENT — TONOMETRY
OS_IOP_MMHG: 7
OD_IOP_MMHG: 7

## 2024-12-11 ASSESSMENT — VISUAL ACUITY
OS_CC: 20/30-2
OD_CC: 20/60

## 2025-06-25 ENCOUNTER — FOLLOW UP (OUTPATIENT)
Dept: URBAN - METROPOLITAN AREA CLINIC 98 | Facility: CLINIC | Age: 85
End: 2025-06-25

## 2025-06-25 DIAGNOSIS — H43.813: ICD-10-CM

## 2025-06-25 DIAGNOSIS — Z79.4: ICD-10-CM

## 2025-06-25 DIAGNOSIS — Z91.81: ICD-10-CM

## 2025-06-25 DIAGNOSIS — H35.3131: ICD-10-CM

## 2025-06-25 DIAGNOSIS — H35.371: ICD-10-CM

## 2025-06-25 DIAGNOSIS — H26.493: ICD-10-CM

## 2025-06-25 DIAGNOSIS — E11.3212: ICD-10-CM

## 2025-06-25 DIAGNOSIS — E11.3291: ICD-10-CM

## 2025-06-25 PROCEDURE — 92014 COMPRE OPH EXAM EST PT 1/>: CPT

## 2025-06-25 PROCEDURE — 92202 OPSCPY EXTND ON/MAC DRAW: CPT

## 2025-06-25 PROCEDURE — 92134 CPTRZ OPH DX IMG PST SGM RTA: CPT

## 2025-06-25 ASSESSMENT — TONOMETRY
OS_IOP_MMHG: 12
OD_IOP_MMHG: 14

## 2025-06-25 ASSESSMENT — VISUAL ACUITY
OS_SC: 20/60-1
OD_SC: 20/80+2

## 2025-07-07 ENCOUNTER — OFFICE VISIT (OUTPATIENT)
Dept: FAMILY MEDICINE | Facility: CLINIC | Age: 85
End: 2025-07-07
Payer: COMMERCIAL

## 2025-07-07 VITALS
DIASTOLIC BLOOD PRESSURE: 78 MMHG | WEIGHT: 140 LBS | SYSTOLIC BLOOD PRESSURE: 134 MMHG | BODY MASS INDEX: 21.97 KG/M2 | TEMPERATURE: 98.3 F | OXYGEN SATURATION: 96 % | HEIGHT: 67 IN | RESPIRATION RATE: 18 BRPM | HEART RATE: 62 BPM

## 2025-07-07 DIAGNOSIS — L98.9 CHANGING SKIN LESION: Primary | ICD-10-CM

## 2025-07-07 PROCEDURE — 3078F DIAST BP <80 MM HG: CPT | Performed by: FAMILY MEDICINE

## 2025-07-07 PROCEDURE — 1126F AMNT PAIN NOTED NONE PRSNT: CPT | Performed by: FAMILY MEDICINE

## 2025-07-07 PROCEDURE — 3075F SYST BP GE 130 - 139MM HG: CPT | Performed by: FAMILY MEDICINE

## 2025-07-07 PROCEDURE — G2211 COMPLEX E/M VISIT ADD ON: HCPCS | Performed by: FAMILY MEDICINE

## 2025-07-07 PROCEDURE — 99213 OFFICE O/P EST LOW 20 MIN: CPT | Performed by: FAMILY MEDICINE

## 2025-07-07 ASSESSMENT — PAIN SCALES - GENERAL: PAINLEVEL_OUTOF10: NO PAIN (0)

## 2025-07-07 NOTE — PROGRESS NOTES
"  Assessment & Plan       Changing skin lesion  Needs to follow-up with derm  Referral given. If cannot get in within a month or so, would call insurance to see other dermatology options  - Adult Dermatology  Referral; Future        The longitudinal plan of care for the diagnosis(es)/condition(s) as documented were addressed during this visit. Due to the added complexity in care, I will continue to support Froy in the subsequent management and with ongoing continuity of care.          Subjective   Froy is a 84 year old, presenting for the following health issues:  Derm Problem (2 spots on head/)      7/7/2025    11:57 AM   Additional Questions   Roomed by dayday   Accompanied by wife Radha     History of Present Illness       Reason for visit:  2 bumps on head   He is taking medications regularly.        Pt with 2 lesions on his scalp  They noticed them about 3 months ago  They do not bother him      Review of Systems  Constitutional, HEENT, cardiovascular, pulmonary, gi and gu systems are negative, except as otherwise noted.      Objective    /78   Pulse 62   Temp 98.3  F (36.8  C) (Oral)   Resp 18   Ht 1.689 m (5' 6.5\")   Wt 63.5 kg (140 lb)   SpO2 96%   BMI 22.26 kg/m    Body mass index is 22.26 kg/m .  Physical Exam   GENERAL: alert and no distress  SKIN:  on scalp 2 lesions which appear hyperkeratotic with dark area in the middle suspicious for SCC      No results found for this or any previous visit (from the past 24 hours).        Signed Electronically by: Mayra Palomares MD    "

## 2025-07-24 ENCOUNTER — OFFICE VISIT (OUTPATIENT)
Dept: DERMATOLOGY | Facility: CLINIC | Age: 85
End: 2025-07-24
Payer: COMMERCIAL

## 2025-07-24 DIAGNOSIS — L57.0 ACTINIC KERATOSIS: ICD-10-CM

## 2025-07-24 DIAGNOSIS — D48.9 NEOPLASM OF UNCERTAIN BEHAVIOR: Primary | ICD-10-CM

## 2025-07-24 RX ORDER — FLUOROURACIL 50 MG/G
CREAM TOPICAL
Qty: 40 G | Refills: 1 | Status: SHIPPED | OUTPATIENT
Start: 2025-07-24

## 2025-07-24 RX ORDER — CALCIPOTRIENE 50 UG/G
CREAM TOPICAL
Qty: 60 G | Refills: 1 | Status: SHIPPED | OUTPATIENT
Start: 2025-07-24

## 2025-07-24 ASSESSMENT — PAIN SCALES - GENERAL: PAINLEVEL_OUTOF10: NO PAIN (0)

## 2025-07-24 NOTE — PROGRESS NOTES
[facility] Dermatology Note  Encounter Date: Jul 24, 2025  Office Visit     Reviewed patients past medical history and pertinent chart review prior to patients visit today.     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  ____________________________________________    Assessment & Plan:     # Neoplasm of uncertain behavior:  left frontal scalp A  DDx includes SCC vs other. Shave biopsy today.  # Neoplasm of uncertain behavior:  mid parietal scalp B  DDx includes SCC vs other. Shave biopsy today.  # Neoplasm of uncertain behavior:  mid vertex scalp C  DDx includes SCCC vs SCCIS vs other. Shave biopsy today.  Photograph obtained.  Procedure Note: Biopsy by shave technique  The risks and benefits of the procedure were described to the patient. These include but are not limited to bleeding, infection, scar, incomplete removal, and non-diagnostic biopsy. Verbal informed consent was obtained. The above site(s) was cleansed with an alcohol pad and injected with 1% lidocaine with epinephrine. Once anesthesia was obtained, a biopsy(ies) was performed with Gilette blade. The tissue(s) was placed in a labeled container(s) with formalin and sent to pathology. Hemostasis was achieved with aluminum chloride. Vaseline and a bandage were applied to the wound(s). The patient tolerated the procedure well and was given post biopsy care instructions.    # Diffuse actinic keratoses, face and scalp  Actinic keratoses are pre-cancerous skin growths caused by sun exposure. Treatment is recommended and medically indicated. Patient begin treating affected areas with Efudex plus calcipotriene.  Start Efudex  calcipotriene twice daily x7 days.  Patient instructed to wait to start this until after his biopsy results have healed.  Counseled that patient should expect erythema and scale, but should discontinue this medication if significant oozing or pain greater than 5/10 is to occur. Recommend the patient wash hands after use or use gloves to apply. Keep medication away from pets.     Follow up: pending biopsy results.     All risks, benefits and alternatives were discussed with patient.  Patient is in agreement and understands the assessment and plan.  All questions were answered.  Christine Pena PA-C  [facility] Dermatology  _______________________________________    CC: Derm Problem (2 spots of concern to scalp- some crusting and some bleeding and has had these for a few months.)     HPI:  Mr. Froy Loredo is a(n) 84 year old male who presents today as a new patient for evaluation of a skin lesion involving the scalp x2.  He is accompanied by his wife.  The lesions on the scalp have been present for at least a few months.  These are crusty and scaly.  They occasionally itch.  He denies pain or bleeding.  He is not sure if they have grown.    Patient is otherwise feeling well, without additional skin concerns.     Physical Exam:  SKIN: Focused examination of face and scalp only was performed per patient request.  - 1.0 cm hyperkeratotic papule at the left front scalp A  - 0.8 cm hyperkeratotic papule at the mid parietal scalp B  - 0.4 cm hyperkeratotic papule at the mid vertex scalp C  - scalp and face, diffuse pink macule(s) with overlying adherent scale consistent with an actinic keratosis     - No other lesions of concern on areas examined.                   Medications:  Current Outpatient Medications   Medication Sig Dispense Refill    albuterol (PROAIR HFA/PROVENTIL HFA/VENTOLIN HFA) 108 (90 Base) MCG/ACT inhaler Inhale 2 puffs into the lungs every 4 hours as needed for shortness of breath / dyspnea or  wheezing 2 Inhaler 3    cholecalciferol (VITAMIN D3) 125 mcg (5000 units) capsule Take 125 mcg by mouth daily      finasteride (PROSCAR) 5 MG tablet Take 1 tablet (5 mg) by mouth daily 90 tablet 1    galantamine (RAZADYNE ER) 8 MG 24 hr capsule Take 8 mg by mouth daily (with breakfast)      memantine (NAMENDA) 5 MG tablet 1 tablet 2 times daily      methylPREDNISolone (MEDROL DOSEPAK) 4 MG tablet therapy pack Follow Package Directions (Patient not taking: Reported on 7/7/2025) 21 tablet 0    polyethylene glycol 0.4%- propylene glycol 0.3% (SYSTANE ULTRA) 0.4-0.3 % SOLN ophthalmic solution Place 1 drop into both eyes 2 times daily      sulindac (CLINORIL) 200 MG tablet Take 1 tablet (200 mg) by mouth 2 times daily (Patient not taking: Reported on 7/7/2025) 60 tablet 11    tamsulosin (FLOMAX) 0.4 MG capsule TAKE 2 CAPSULES BY MOUTH EVERY DAY (Patient not taking: Reported on 7/7/2025) 60 capsule 0    VITAMIN C OR 2 TABLETS DAILY (Patient not taking: Reported on 7/7/2025)       No current facility-administered medications for this visit.      Past Medical History:   Patient Active Problem List   Diagnosis    Benign prostatic hyperplasia with urinary obstruction    CARDIOVASCULAR SCREENING; LDL GOAL LESS THAN 160    History of skin cancer    AK (actinic keratosis)    ED (erectile dysfunction)    Lymphopenia    Carcinoma in situ    Right arm cellulitis    Microscopic hematuria    Gastroesophageal reflux disease without esophagitis    PSC (posterior subcapsular cataract), left    Nuclear sclerotic cataract of both eyes    Right sided sciatica    Achilles tendonosis of right lower extremity    Shortness of breath    Post-nasal drip    Allergic rhinitis due to dust mite    Allergic rhinitis due to mold    Seasonal allergic rhinitis due to pollen     Past Medical History:   Diagnosis Date    Actinic keratosis     Basal cell carcinoma 07/02/2008    left chest    BPH (benign prostatic hypertrophy) with urinary obstruction <2003     Eczema     PSC (posterior subcapsular cataract), left 5/10/2016    Skin cancer 11/8/2006    sccis L helix    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

## 2025-07-24 NOTE — PATIENT INSTRUCTIONS
Efudex and calcipotriene combination Treatment  Today, you are being prescribed Fluorouracil (Efudex) and calcipotriene, topical creams, used for the treatment of Actinic Keratosis (AKs).  The medication is working to eliminate the unhealthy cells.  This treatment may be unattractive and somewhat uncomfortable.  Your treatment will last twice daily for 7 days on the face and scalp. You may experience some mild discomfort while being treated.  You will want to stop any other creams such as glycolic acid products, retin A, Tazorac, etc. to the area. You may use bland makeup/cover-up as long as it doesn't sting or cause you discomfort.  When using calcipotriene and Fluorouracil, apply the cream both morning and night for 7 days as your provider recommends. Use a Q-tip or use gloves if applying it with your fingertips. If applied with unprotected fingertips, it is important to wash your hands well after you apply this medicine.   Keep this medication away from pets.  We recommend avoiding excessive sun exposure to the treated area. Wear a sunscreen with SPF 50+ to the areas being treated prior to any sun exposure as you will be more sensitive to the sun and more prone to sunburn while being treated and afterwards during healing process.   You may use ointments such as vaseline or Aquaphor, or moisturizing creams such as Vanicream or Cetaphil cream over bothersome areas. If the reaction becomes itchy you may apply over the counter hydrocorisone 1% cream or ointment twice daily to itchy areas. If the reaction becomes too bothersome, please call the clinic as we may need to discontinue treatment or reevaluate in the clinic.     Potential Side Effects  Your treated areas may be unsightly during therapy.  This will improve slowly following the discontinuation of therapy.   During the beginning of treatment, mild inflammation may occur.   During the end of treatment, redness, and swelling may occur with some crusting and  burning.   Lesions resolve as the skin exfoliates.   Over 1 to 2 weeks, new skin grows into the treatment area.  Keep this medication away from pets as it can be very harmful to them.     Specific side effects that usually do not require medical attention (report to your doctor or health care professional if they continue or are bothersome) include:  Red or dark-colored skin   Mild erosion (loss of upper layer of skin)   Mild eye irritation including burning, itching, sensitivity, stinging, or watering   Increased sensitivity of the skin to sun and ultraviolet light   Pain and burning of the affected area   Dryness, scaling or swelling of the affected area   Skin rash, itching of the affected area   Tenderness   If you have severe pain, please, call the clinic immediately and indicate that you have pain.  Ask for a call from the RN.     Who should I call with questions?  Dermatology nurse line: [phone]       Wound Care After a Biopsy    What is a skin biopsy?  A skin biopsy allows the doctor to examine a very small piece of tissue under the microscope to determine the diagnosis and the best treatment for the skin condition. A local anesthetic (numbing medicine)  is injected with a very small needle into the skin area to be tested. A small piece of skin is taken from the area. Sometimes a suture (stitch) is used.     What are the risks of a skin biopsy?  I will experience scar, bleeding, swelling, pain, crusting and redness. I may experience incomplete removal or recurrence. Risks of this procedure are excessive bleeding, bruising, infection, nerve damage, numbness, thick (hypertrophic or keloidal) scar and non-diagnostic biopsy.    How should I care for my wound for the first 24 hours?  Keep the wound dry and covered for 24 hours  If it bleeds, hold direct pressure on the area for 15 minutes. If bleeding does not stop then go to the emergency room  Avoid strenuous exercise the first 1-2 days or as your doctor  instructs you    How should I care for the wound after 24 hours?  After 24 hours, remove the bandage  You may bathe or shower as normal  If you had a scalp biopsy, you can shampoo as usual and can use shower water to clean the biopsy site daily  Clean the wound twice a day with gentle soap and water  Do not scrub, be gentle  Apply white petroleum/Vaseline after cleaning the wound with a cotton swab or a clean finger, and keep the site covered with a Bandaid /bandage. Bandages are not necessary with a scalp biopsy  If you are unable to cover the site with a Bandaid /bandage, re-apply ointment 2-3 times a day to keep the site moist. Moisture will help with healing  Avoid strenuous activity for first 1-2 days  Avoid lakes, rivers, pools, and oceans until the stitches are removed or the site is healed    How do I clean my wound?  Wash hands thoroughly with soap or use hand  before all wound care  Clean the wound with gentle soap and water  Apply white petroleum/Vaseline  to wound after it is clean  Replace the Bandaid /bandage to keep the wound covered for the first few days or as instructed by your doctor  If you had a scalp biopsy, warm shower water to the area on a daily basis should suffice    What should I use to clean my wound?   Cotton-tipped applicators (Qtips )  White petroleum jelly (Vaseline ). Use a clean new container and use Q-tips to apply.  Bandaids   as needed  Gentle soap     How should I care for my wound long term?  Do not get your wound dirty  Keep up with wound care for one week or until the area is healed.  A small scab will form and fall off by itself when the area is completely healed. The area will be red and will become pink in color as it heals. Sun protection is very important for how your scar will turn out. Sunscreen with an SPF 30 or greater is recommended once the area is healed.  You should have some soreness but it should be mild and slowly go away over several days. Talk to  your doctor about using tylenol for pain,    When should I call my doctor?  If you have increased:   Pain or swelling  Pus or drainage (clear or slightly yellow drainage is ok)  Temperature over 100F  Spreading redness or warmth around wound    When will I hear about my results?  The biopsy results can take 2 weeks to come back.  Your results will automatically release to [facility] before your provider has even reviewed them.  The clinic will call you with the results, send you a [facility] message, or have you schedule a follow-up clinic or phone time to discuss the results.  Contact our clinics if you do not hear from us in 2 weeks. If further treatment is needed for a skin cancer, this will be done at either our [place] or [place] office.    Who should I call with questions?  [facility]: [phone]  [facility]: [phone]  For urgent needs outside of business hours call the [facility] at [phone] and ask for the dermatology resident on call

## 2025-07-24 NOTE — LETTER
7/24/2025      Froy Loredo  [address] [place] [place] [address]      Dear Colleague,    Thank you for referring your patient, Froy Loredo, to the [facility]. Please see a copy of my visit note below.    [facility] Dermatology Note  Encounter Date: Jul 24, 2025  Office Visit     Reviewed patients past medical history and pertinent chart review prior to patients visit today.     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  ____________________________________________    Assessment & Plan:     # Neoplasm of uncertain behavior:  left frontal scalp A  DDx includes SCC vs other. Shave biopsy today.  # Neoplasm of uncertain behavior:  mid parietal scalp B  DDx includes SCC vs other. Shave biopsy today.  # Neoplasm of uncertain behavior:  mid vertex scalp C  DDx includes SCCC vs SCCIS vs other. Shave biopsy today.  Photograph obtained.  Procedure Note: Biopsy by shave technique  The risks and benefits of the procedure were described to the patient. These include but are not limited to bleeding, infection, scar, incomplete removal, and non-diagnostic biopsy. Verbal informed consent was obtained. The above site(s) was cleansed with an alcohol pad and injected with 1% lidocaine with epinephrine. Once anesthesia was obtained, a biopsy(ies) was performed with Gilette blade. The tissue(s) was placed in a labeled container(s) with formalin and sent to pathology. Hemostasis was achieved with aluminum chloride. Vaseline and a bandage were applied to the wound(s). The patient tolerated the procedure well and was given post biopsy care instructions.    # Diffuse  actinic keratoses, face and scalp  Actinic keratoses are pre-cancerous skin growths caused by sun exposure. Treatment is recommended and medically indicated. Patient begin treating affected areas with Efudex plus calcipotriene.  Start Efudex calcipotriene twice daily x7 days.  Patient instructed to wait to start this until after his biopsy results have healed.  Counseled that patient should expect erythema and scale, but should discontinue this medication if significant oozing or pain greater than 5/10 is to occur. Recommend the patient wash hands after use or use gloves to apply. Keep medication away from pets.     Follow up: pending biopsy results.     All risks, benefits and alternatives were discussed with patient.  Patient is in agreement and understands the assessment and plan.  All questions were answered.  Christine Pena PA-C  [facility]  _______________________________________    CC: Derm Problem (2 spots of concern to scalp- some crusting and some bleeding and has had these for a few months.)     HPI:  Mr. Froy Loredo is a(n) 84 year old male who presents today as a new patient for evaluation of a skin lesion involving the scalp x2.  He is accompanied by his wife.  The lesions on the scalp have been present for at least a few months.  These are crusty and scaly.  They occasionally itch.  He denies pain or bleeding.  He is not sure if they have grown.    Patient is otherwise feeling well, without additional skin concerns.     Physical Exam:  SKIN: Focused examination of face and scalp only was performed per patient request.  - 1.0 cm hyperkeratotic papule at the left front scalp A  - 0.8 cm hyperkeratotic papule at the mid parietal scalp B  - 0.4 cm hyperkeratotic papule at the mid vertex scalp C  - scalp and face, diffuse pink macule(s) with overlying adherent scale consistent with an actinic keratosis     - No other lesions of concern on areas examined.                    Medications:  Current Outpatient Medications   Medication Sig Dispense Refill     albuterol (PROAIR HFA/PROVENTIL HFA/VENTOLIN HFA) 108 (90 Base) MCG/ACT inhaler Inhale 2 puffs into the lungs every 4 hours as needed for shortness of breath / dyspnea or wheezing 2 Inhaler 3     cholecalciferol (VITAMIN D3) 125 mcg (5000 units) capsule Take 125 mcg by mouth daily       finasteride (PROSCAR) 5 MG tablet Take 1 tablet (5 mg) by mouth daily 90 tablet 1     galantamine (RAZADYNE ER) 8 MG 24 hr capsule Take 8 mg by mouth daily (with breakfast)       memantine (NAMENDA) 5 MG tablet 1 tablet 2 times daily       methylPREDNISolone (MEDROL DOSEPAK) 4 MG tablet therapy pack Follow Package Directions (Patient not taking: Reported on 7/7/2025) 21 tablet 0     polyethylene glycol 0.4%- propylene glycol 0.3% (SYSTANE ULTRA) 0.4-0.3 % SOLN ophthalmic solution Place 1 drop into both eyes 2 times daily       sulindac (CLINORIL) 200 MG tablet Take 1 tablet (200 mg) by mouth 2 times daily (Patient not taking: Reported on 7/7/2025) 60 tablet 11     tamsulosin (FLOMAX) 0.4 MG capsule TAKE 2 CAPSULES BY MOUTH EVERY DAY (Patient not taking: Reported on 7/7/2025) 60 capsule 0     VITAMIN C OR 2 TABLETS DAILY (Patient not taking: Reported on 7/7/2025)       No current facility-administered medications for this visit.      Past Medical History:   Patient Active Problem List   Diagnosis     Benign prostatic hyperplasia with urinary obstruction     CARDIOVASCULAR SCREENING; LDL GOAL LESS THAN 160     History of skin cancer     AK (actinic keratosis)     ED (erectile dysfunction)     Lymphopenia     Carcinoma in situ     Right arm cellulitis     Microscopic hematuria     Gastroesophageal reflux disease without esophagitis     PSC (posterior subcapsular cataract), left     Nuclear sclerotic cataract of both eyes     Right sided sciatica     Achilles tendonosis of right lower extremity     Shortness of breath     Post-nasal drip      Allergic rhinitis due to dust mite     Allergic rhinitis due to mold     Seasonal allergic rhinitis due to pollen     Past Medical History:   Diagnosis Date     Actinic keratosis      Basal cell carcinoma 07/02/2008    left chest     BPH (benign prostatic hypertrophy) with urinary obstruction <2003     Eczema      PSC (posterior subcapsular cataract), left 5/10/2016     Skin cancer 11/8/2006    sccis L helix     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     Again, thank you for allowing me to participate in the care of your patient.        Sincerely,        Chayito Pena PA-C    Electronically signed

## 2025-07-28 LAB
PATH REPORT.COMMENTS IMP SPEC: ABNORMAL
PATH REPORT.COMMENTS IMP SPEC: ABNORMAL
PATH REPORT.COMMENTS IMP SPEC: YES
PATH REPORT.FINAL DX SPEC: ABNORMAL
PATH REPORT.GROSS SPEC: ABNORMAL
PATH REPORT.MICROSCOPIC SPEC OTHER STN: ABNORMAL
PATH REPORT.RELEVANT HX SPEC: ABNORMAL

## 2025-08-05 ENCOUNTER — TRANSFERRED RECORDS (OUTPATIENT)
Dept: HEALTH INFORMATION MANAGEMENT | Facility: CLINIC | Age: 85
End: 2025-08-05
Payer: COMMERCIAL

## 2025-08-06 ENCOUNTER — TELEPHONE (OUTPATIENT)
Dept: DERMATOLOGY | Facility: CLINIC | Age: 85
End: 2025-08-06
Payer: COMMERCIAL

## 2025-08-07 ENCOUNTER — TELEPHONE (OUTPATIENT)
Dept: DERMATOLOGY | Facility: CLINIC | Age: 85
End: 2025-08-07
Payer: COMMERCIAL

## 2025-08-07 RX ORDER — GALANTAMINE HYDROBROMIDE 16 MG/1
16 CAPSULE, EXTENDED RELEASE ORAL DAILY
COMMUNITY
Start: 2025-05-18

## 2025-08-07 RX ORDER — MEMANTINE HYDROCHLORIDE 10 MG/1
1 TABLET ORAL
COMMUNITY
Start: 2025-07-24